# Patient Record
Sex: MALE | Race: ASIAN | NOT HISPANIC OR LATINO | URBAN - METROPOLITAN AREA
[De-identification: names, ages, dates, MRNs, and addresses within clinical notes are randomized per-mention and may not be internally consistent; named-entity substitution may affect disease eponyms.]

---

## 2017-01-30 ENCOUNTER — OUTPATIENT (OUTPATIENT)
Dept: OUTPATIENT SERVICES | Facility: HOSPITAL | Age: 52
LOS: 1 days | End: 2017-01-30

## 2017-01-30 ENCOUNTER — RESULT CHARGE (OUTPATIENT)
Age: 52
End: 2017-01-30

## 2017-01-30 ENCOUNTER — APPOINTMENT (OUTPATIENT)
Dept: INTERNAL MEDICINE | Facility: HOSPITAL | Age: 52
End: 2017-01-30

## 2017-01-30 VITALS — HEIGHT: 67 IN | WEIGHT: 214 LBS | BODY MASS INDEX: 33.59 KG/M2

## 2017-01-30 VITALS — DIASTOLIC BLOOD PRESSURE: 70 MMHG | SYSTOLIC BLOOD PRESSURE: 125 MMHG

## 2017-01-31 DIAGNOSIS — E78.5 HYPERLIPIDEMIA, UNSPECIFIED: ICD-10-CM

## 2017-01-31 DIAGNOSIS — R07.89 OTHER CHEST PAIN: ICD-10-CM

## 2017-01-31 DIAGNOSIS — E11.9 TYPE 2 DIABETES MELLITUS WITHOUT COMPLICATIONS: ICD-10-CM

## 2017-01-31 DIAGNOSIS — M54.31 SCIATICA, RIGHT SIDE: ICD-10-CM

## 2017-01-31 DIAGNOSIS — I10 ESSENTIAL (PRIMARY) HYPERTENSION: ICD-10-CM

## 2017-02-01 LAB — GLUCOSE BLDC GLUCOMTR-MCNC: 191

## 2017-03-16 ENCOUNTER — APPOINTMENT (OUTPATIENT)
Dept: GASTROENTEROLOGY | Facility: HOSPITAL | Age: 52
End: 2017-03-16

## 2017-03-16 ENCOUNTER — OUTPATIENT (OUTPATIENT)
Dept: OUTPATIENT SERVICES | Facility: HOSPITAL | Age: 52
LOS: 1 days | End: 2017-03-16

## 2017-03-16 VITALS
BODY MASS INDEX: 33.43 KG/M2 | HEIGHT: 67 IN | DIASTOLIC BLOOD PRESSURE: 83 MMHG | WEIGHT: 213 LBS | SYSTOLIC BLOOD PRESSURE: 133 MMHG | HEART RATE: 76 BPM

## 2017-03-16 DIAGNOSIS — Z12.11 ENCOUNTER FOR SCREENING FOR MALIGNANT NEOPLASM OF COLON: ICD-10-CM

## 2017-03-16 DIAGNOSIS — Z83.71 FAMILY HISTORY OF COLONIC POLYPS: ICD-10-CM

## 2017-04-04 ENCOUNTER — APPOINTMENT (OUTPATIENT)
Dept: OPHTHALMOLOGY | Facility: CLINIC | Age: 52
End: 2017-04-04

## 2017-04-04 ENCOUNTER — OUTPATIENT (OUTPATIENT)
Dept: OUTPATIENT SERVICES | Facility: HOSPITAL | Age: 52
LOS: 1 days | End: 2017-04-04

## 2017-04-06 ENCOUNTER — NON-APPOINTMENT (OUTPATIENT)
Age: 52
End: 2017-04-06

## 2017-04-06 ENCOUNTER — APPOINTMENT (OUTPATIENT)
Dept: CARDIOLOGY | Facility: HOSPITAL | Age: 52
End: 2017-04-06

## 2017-04-06 ENCOUNTER — OUTPATIENT (OUTPATIENT)
Dept: OUTPATIENT SERVICES | Facility: HOSPITAL | Age: 52
LOS: 1 days | End: 2017-04-06

## 2017-04-06 VITALS
DIASTOLIC BLOOD PRESSURE: 73 MMHG | HEART RATE: 78 BPM | RESPIRATION RATE: 17 BRPM | BODY MASS INDEX: 32.11 KG/M2 | WEIGHT: 205 LBS | SYSTOLIC BLOOD PRESSURE: 120 MMHG | OXYGEN SATURATION: 98 %

## 2017-04-10 DIAGNOSIS — R07.89 OTHER CHEST PAIN: ICD-10-CM

## 2017-05-15 ENCOUNTER — RESULT CHARGE (OUTPATIENT)
Age: 52
End: 2017-05-15

## 2017-05-15 ENCOUNTER — OUTPATIENT (OUTPATIENT)
Dept: OUTPATIENT SERVICES | Facility: HOSPITAL | Age: 52
LOS: 1 days | End: 2017-05-15

## 2017-05-15 ENCOUNTER — LABORATORY RESULT (OUTPATIENT)
Age: 52
End: 2017-05-15

## 2017-05-15 ENCOUNTER — APPOINTMENT (OUTPATIENT)
Dept: INTERNAL MEDICINE | Facility: HOSPITAL | Age: 52
End: 2017-05-15

## 2017-05-15 VITALS — HEIGHT: 67 IN | WEIGHT: 216 LBS | BODY MASS INDEX: 33.9 KG/M2

## 2017-05-15 LAB
BUN SERPL-MCNC: 13 MG/DL — SIGNIFICANT CHANGE UP (ref 7–23)
CALCIUM SERPL-MCNC: 9.3 MG/DL — SIGNIFICANT CHANGE UP (ref 8.4–10.5)
CHLORIDE SERPL-SCNC: 97 MMOL/L — LOW (ref 98–107)
CO2 SERPL-SCNC: 27 MMOL/L — SIGNIFICANT CHANGE UP (ref 22–31)
CREAT SERPL-MCNC: 0.92 MG/DL — SIGNIFICANT CHANGE UP (ref 0.5–1.3)
GLUCOSE SERPL-MCNC: 186 MG/DL — HIGH (ref 70–99)
HBA1C BLD-MCNC: 8.4 % — HIGH (ref 4–5.6)
POTASSIUM SERPL-MCNC: 4.2 MMOL/L — SIGNIFICANT CHANGE UP (ref 3.5–5.3)
POTASSIUM SERPL-SCNC: 4.2 MMOL/L — SIGNIFICANT CHANGE UP (ref 3.5–5.3)
SODIUM SERPL-SCNC: 137 MMOL/L — SIGNIFICANT CHANGE UP (ref 135–145)

## 2017-05-16 VITALS — DIASTOLIC BLOOD PRESSURE: 70 MMHG | SYSTOLIC BLOOD PRESSURE: 110 MMHG

## 2017-05-16 LAB — PSA FLD-MCNC: 0.86 NG/ML — SIGNIFICANT CHANGE UP (ref 0–4)

## 2017-05-17 ENCOUNTER — CHART COPY (OUTPATIENT)
Age: 52
End: 2017-05-17

## 2017-05-17 ENCOUNTER — RESULT REVIEW (OUTPATIENT)
Age: 52
End: 2017-05-17

## 2017-05-17 ENCOUNTER — OUTPATIENT (OUTPATIENT)
Dept: OUTPATIENT SERVICES | Facility: HOSPITAL | Age: 52
LOS: 1 days | Discharge: HOME | End: 2017-05-17
Payer: SUBSIDIZED

## 2017-05-17 DIAGNOSIS — Z12.11 ENCOUNTER FOR SCREENING FOR MALIGNANT NEOPLASM OF COLON: ICD-10-CM

## 2017-05-17 PROCEDURE — 43239 EGD BIOPSY SINGLE/MULTIPLE: CPT | Mod: GC

## 2017-05-17 PROCEDURE — G0121 COLON CA SCRN NOT HI RSK IND: CPT | Mod: GC

## 2017-05-17 PROCEDURE — 88305 TISSUE EXAM BY PATHOLOGIST: CPT | Mod: 26

## 2017-05-17 PROCEDURE — 88312 SPECIAL STAINS GROUP 1: CPT | Mod: 26

## 2017-05-19 LAB — SURGICAL PATHOLOGY STUDY: SIGNIFICANT CHANGE UP

## 2017-05-22 DIAGNOSIS — I10 ESSENTIAL (PRIMARY) HYPERTENSION: ICD-10-CM

## 2017-05-22 DIAGNOSIS — E78.5 HYPERLIPIDEMIA, UNSPECIFIED: ICD-10-CM

## 2017-05-22 DIAGNOSIS — M54.31 SCIATICA, RIGHT SIDE: ICD-10-CM

## 2017-05-22 DIAGNOSIS — R07.89 OTHER CHEST PAIN: ICD-10-CM

## 2017-05-22 DIAGNOSIS — Z12.11 ENCOUNTER FOR SCREENING FOR MALIGNANT NEOPLASM OF COLON: ICD-10-CM

## 2017-05-22 DIAGNOSIS — E11.9 TYPE 2 DIABETES MELLITUS WITHOUT COMPLICATIONS: ICD-10-CM

## 2017-05-25 LAB — GLUCOSE BLDC GLUCOMTR-MCNC: 214

## 2017-07-18 DIAGNOSIS — H04.123 DRY EYE SYNDROME OF BILATERAL LACRIMAL GLANDS: ICD-10-CM

## 2017-10-19 ENCOUNTER — APPOINTMENT (OUTPATIENT)
Dept: GASTROENTEROLOGY | Facility: HOSPITAL | Age: 52
End: 2017-10-19

## 2017-10-19 ENCOUNTER — OUTPATIENT (OUTPATIENT)
Dept: OUTPATIENT SERVICES | Facility: HOSPITAL | Age: 52
LOS: 1 days | End: 2017-10-19

## 2017-10-19 VITALS
WEIGHT: 213 LBS | SYSTOLIC BLOOD PRESSURE: 146 MMHG | HEART RATE: 72 BPM | HEIGHT: 67 IN | DIASTOLIC BLOOD PRESSURE: 72 MMHG | BODY MASS INDEX: 33.43 KG/M2

## 2017-10-19 DIAGNOSIS — E66.9 OBESITY, UNSPECIFIED: ICD-10-CM

## 2017-10-19 DIAGNOSIS — K64.8 OTHER HEMORRHOIDS: ICD-10-CM

## 2017-10-19 DIAGNOSIS — Z12.11 ENCOUNTER FOR SCREENING FOR MALIGNANT NEOPLASM OF COLON: ICD-10-CM

## 2017-11-09 ENCOUNTER — OUTPATIENT (OUTPATIENT)
Dept: OUTPATIENT SERVICES | Facility: HOSPITAL | Age: 52
LOS: 1 days | End: 2017-11-09

## 2017-11-09 ENCOUNTER — LABORATORY RESULT (OUTPATIENT)
Age: 52
End: 2017-11-09

## 2017-11-09 ENCOUNTER — APPOINTMENT (OUTPATIENT)
Dept: INTERNAL MEDICINE | Facility: HOSPITAL | Age: 52
End: 2017-11-09

## 2017-11-09 VITALS — WEIGHT: 211 LBS | BODY MASS INDEX: 33.12 KG/M2 | HEIGHT: 67 IN

## 2017-11-09 VITALS — SYSTOLIC BLOOD PRESSURE: 133 MMHG | DIASTOLIC BLOOD PRESSURE: 82 MMHG | HEART RATE: 76 BPM

## 2017-11-09 DIAGNOSIS — Z23 ENCOUNTER FOR IMMUNIZATION: ICD-10-CM

## 2017-11-09 LAB
BASOPHILS # BLD AUTO: 0.06 K/UL — SIGNIFICANT CHANGE UP (ref 0–0.2)
BASOPHILS NFR BLD AUTO: 0.7 % — SIGNIFICANT CHANGE UP (ref 0–2)
CHOLEST SERPL-MCNC: 121 MG/DL — SIGNIFICANT CHANGE UP (ref 120–199)
EOSINOPHIL # BLD AUTO: 0.56 K/UL — HIGH (ref 0–0.5)
EOSINOPHIL NFR BLD AUTO: 6.1 % — HIGH (ref 0–6)
HBA1C BLD-MCNC: 8.2 % — HIGH (ref 4–5.6)
HCT VFR BLD CALC: 42.3 % — SIGNIFICANT CHANGE UP (ref 39–50)
HDLC SERPL-MCNC: 47 MG/DL — SIGNIFICANT CHANGE UP (ref 35–55)
HGB BLD-MCNC: 13.5 G/DL — SIGNIFICANT CHANGE UP (ref 13–17)
IMM GRANULOCYTES # BLD AUTO: 0.02 # — SIGNIFICANT CHANGE UP
IMM GRANULOCYTES NFR BLD AUTO: 0.2 % — SIGNIFICANT CHANGE UP (ref 0–1.5)
LIPID PNL WITH DIRECT LDL SERPL: 60 MG/DL — SIGNIFICANT CHANGE UP
LYMPHOCYTES # BLD AUTO: 2.04 K/UL — SIGNIFICANT CHANGE UP (ref 1–3.3)
LYMPHOCYTES # BLD AUTO: 22.4 % — SIGNIFICANT CHANGE UP (ref 13–44)
MCHC RBC-ENTMCNC: 27.8 PG — SIGNIFICANT CHANGE UP (ref 27–34)
MCHC RBC-ENTMCNC: 31.9 % — LOW (ref 32–36)
MCV RBC AUTO: 87 FL — SIGNIFICANT CHANGE UP (ref 80–100)
MONOCYTES # BLD AUTO: 0.66 K/UL — SIGNIFICANT CHANGE UP (ref 0–0.9)
MONOCYTES NFR BLD AUTO: 7.2 % — SIGNIFICANT CHANGE UP (ref 2–14)
NEUTROPHILS # BLD AUTO: 5.77 K/UL — SIGNIFICANT CHANGE UP (ref 1.8–7.4)
NEUTROPHILS NFR BLD AUTO: 63.4 % — SIGNIFICANT CHANGE UP (ref 43–77)
NRBC # FLD: 0 — SIGNIFICANT CHANGE UP
PLATELET # BLD AUTO: 227 K/UL — SIGNIFICANT CHANGE UP (ref 150–400)
PMV BLD: 11.7 FL — SIGNIFICANT CHANGE UP (ref 7–13)
RBC # BLD: 4.86 M/UL — SIGNIFICANT CHANGE UP (ref 4.2–5.8)
RBC # FLD: 13.2 % — SIGNIFICANT CHANGE UP (ref 10.3–14.5)
TRIGL SERPL-MCNC: 101 MG/DL — SIGNIFICANT CHANGE UP (ref 10–149)
WBC # BLD: 9.11 K/UL — SIGNIFICANT CHANGE UP (ref 3.8–10.5)
WBC # FLD AUTO: 9.11 K/UL — SIGNIFICANT CHANGE UP (ref 3.8–10.5)

## 2017-11-09 RX ORDER — POLYETHYLENE GLYCOL 3350 AND ELECTROLYTES WITH LEMON FLAVOR 236; 22.74; 6.74; 5.86; 2.97 G/4L; G/4L; G/4L; G/4L; G/4L
236 POWDER, FOR SOLUTION ORAL
Qty: 1 | Refills: 0 | Status: COMPLETED | COMMUNITY
Start: 2017-03-16 | End: 2017-11-09

## 2017-11-09 RX ORDER — OMEPRAZOLE 40 MG/1
40 CAPSULE, DELAYED RELEASE ORAL
Qty: 30 | Refills: 3 | Status: COMPLETED | COMMUNITY
Start: 2017-01-30 | End: 2017-11-09

## 2017-11-10 ENCOUNTER — MED ADMIN CHARGE (OUTPATIENT)
Age: 52
End: 2017-11-10

## 2017-11-10 LAB
CREAT UR-MCNC: 222 MG/DL — SIGNIFICANT CHANGE UP
GLUCOSE BLDC GLUCOMTR-MCNC: 214
HCV AB S/CO SERPL IA: 0.1 S/CO — SIGNIFICANT CHANGE UP
HCV AB SERPL-IMP: SIGNIFICANT CHANGE UP
MICROALBUMIN UR-MCNC: 12.8 MG/DL — SIGNIFICANT CHANGE UP
MICROALBUMIN/CREAT UR-RTO: 58 MG/G — HIGH (ref 0–30)

## 2017-11-20 DIAGNOSIS — E11.9 TYPE 2 DIABETES MELLITUS WITHOUT COMPLICATIONS: ICD-10-CM

## 2017-11-20 DIAGNOSIS — E78.5 HYPERLIPIDEMIA, UNSPECIFIED: ICD-10-CM

## 2017-11-20 DIAGNOSIS — R68.89 OTHER GENERAL SYMPTOMS AND SIGNS: ICD-10-CM

## 2017-11-20 DIAGNOSIS — E66.9 OBESITY, UNSPECIFIED: ICD-10-CM

## 2017-11-20 DIAGNOSIS — R07.89 OTHER CHEST PAIN: ICD-10-CM

## 2017-11-20 DIAGNOSIS — I10 ESSENTIAL (PRIMARY) HYPERTENSION: ICD-10-CM

## 2017-11-20 DIAGNOSIS — Z00.00 ENCOUNTER FOR GENERAL ADULT MEDICAL EXAMINATION WITHOUT ABNORMAL FINDINGS: ICD-10-CM

## 2018-02-20 ENCOUNTER — APPOINTMENT (OUTPATIENT)
Dept: INTERNAL MEDICINE | Facility: HOSPITAL | Age: 53
End: 2018-02-20

## 2018-02-20 ENCOUNTER — RESULT CHARGE (OUTPATIENT)
Age: 53
End: 2018-02-20

## 2018-02-20 ENCOUNTER — OUTPATIENT (OUTPATIENT)
Dept: OUTPATIENT SERVICES | Facility: HOSPITAL | Age: 53
LOS: 1 days | End: 2018-02-20

## 2018-02-20 VITALS — BODY MASS INDEX: 33.43 KG/M2 | WEIGHT: 213 LBS | HEIGHT: 67 IN

## 2018-02-20 VITALS — DIASTOLIC BLOOD PRESSURE: 76 MMHG | SYSTOLIC BLOOD PRESSURE: 136 MMHG | HEART RATE: 77 BPM

## 2018-02-23 DIAGNOSIS — Z00.00 ENCOUNTER FOR GENERAL ADULT MEDICAL EXAMINATION WITHOUT ABNORMAL FINDINGS: ICD-10-CM

## 2018-02-23 DIAGNOSIS — E11.9 TYPE 2 DIABETES MELLITUS WITHOUT COMPLICATIONS: ICD-10-CM

## 2018-02-23 DIAGNOSIS — E78.5 HYPERLIPIDEMIA, UNSPECIFIED: ICD-10-CM

## 2018-02-23 DIAGNOSIS — E66.9 OBESITY, UNSPECIFIED: ICD-10-CM

## 2018-02-23 DIAGNOSIS — I10 ESSENTIAL (PRIMARY) HYPERTENSION: ICD-10-CM

## 2018-02-23 DIAGNOSIS — R07.89 OTHER CHEST PAIN: ICD-10-CM

## 2018-02-23 DIAGNOSIS — R68.89 OTHER GENERAL SYMPTOMS AND SIGNS: ICD-10-CM

## 2018-02-23 LAB — GLUCOSE BLDC GLUCOMTR-MCNC: 219

## 2018-03-02 ENCOUNTER — APPOINTMENT (OUTPATIENT)
Dept: OBGYN | Facility: HOSPITAL | Age: 53
End: 2018-03-02

## 2018-03-02 ENCOUNTER — OUTPATIENT (OUTPATIENT)
Dept: OUTPATIENT SERVICES | Facility: HOSPITAL | Age: 53
LOS: 1 days | End: 2018-03-02

## 2018-03-02 DIAGNOSIS — E11.9 TYPE 2 DIABETES MELLITUS WITHOUT COMPLICATIONS: ICD-10-CM

## 2018-04-26 ENCOUNTER — NON-APPOINTMENT (OUTPATIENT)
Age: 53
End: 2018-04-26

## 2018-04-26 ENCOUNTER — APPOINTMENT (OUTPATIENT)
Dept: CARDIOLOGY | Facility: HOSPITAL | Age: 53
End: 2018-04-26

## 2018-04-26 ENCOUNTER — OUTPATIENT (OUTPATIENT)
Dept: OUTPATIENT SERVICES | Facility: HOSPITAL | Age: 53
LOS: 1 days | End: 2018-04-26
Payer: COMMERCIAL

## 2018-04-26 VITALS
HEART RATE: 77 BPM | DIASTOLIC BLOOD PRESSURE: 79 MMHG | OXYGEN SATURATION: 98 % | BODY MASS INDEX: 32.89 KG/M2 | SYSTOLIC BLOOD PRESSURE: 127 MMHG | WEIGHT: 210 LBS | RESPIRATION RATE: 14 BRPM

## 2018-04-27 DIAGNOSIS — R07.89 OTHER CHEST PAIN: ICD-10-CM

## 2018-05-01 ENCOUNTER — APPOINTMENT (OUTPATIENT)
Dept: CV DIAGNOSTICS | Facility: HOSPITAL | Age: 53
End: 2018-05-01

## 2018-05-01 PROCEDURE — 93018 CV STRESS TEST I&R ONLY: CPT | Mod: GC

## 2018-05-01 PROCEDURE — 93016 CV STRESS TEST SUPVJ ONLY: CPT | Mod: GC

## 2018-05-01 PROCEDURE — 78452 HT MUSCLE IMAGE SPECT MULT: CPT | Mod: 26

## 2018-05-04 ENCOUNTER — OUTPATIENT (OUTPATIENT)
Dept: OUTPATIENT SERVICES | Facility: HOSPITAL | Age: 53
LOS: 1 days | End: 2018-05-04

## 2018-05-04 ENCOUNTER — LABORATORY RESULT (OUTPATIENT)
Age: 53
End: 2018-05-04

## 2018-05-04 ENCOUNTER — APPOINTMENT (OUTPATIENT)
Dept: INTERNAL MEDICINE | Facility: HOSPITAL | Age: 53
End: 2018-05-04

## 2018-05-04 VITALS — SYSTOLIC BLOOD PRESSURE: 121 MMHG | HEART RATE: 83 BPM | DIASTOLIC BLOOD PRESSURE: 61 MMHG

## 2018-05-04 VITALS — BODY MASS INDEX: 33.27 KG/M2 | WEIGHT: 212 LBS | HEIGHT: 67 IN

## 2018-05-04 DIAGNOSIS — E11.9 TYPE 2 DIABETES MELLITUS WITHOUT COMPLICATIONS: ICD-10-CM

## 2018-05-04 DIAGNOSIS — R07.89 OTHER CHEST PAIN: ICD-10-CM

## 2018-05-04 DIAGNOSIS — M79.606 PAIN IN LEG, UNSPECIFIED: ICD-10-CM

## 2018-05-04 DIAGNOSIS — Z12.11 ENCOUNTER FOR SCREENING FOR MALIGNANT NEOPLASM OF COLON: ICD-10-CM

## 2018-05-04 DIAGNOSIS — J30.2 OTHER SEASONAL ALLERGIC RHINITIS: ICD-10-CM

## 2018-05-04 LAB — HBA1C BLD-MCNC: 7.4 % — HIGH (ref 4–5.6)

## 2018-05-05 LAB
HCV AB S/CO SERPL IA: 0.1 S/CO — SIGNIFICANT CHANGE UP
HCV AB SERPL-IMP: SIGNIFICANT CHANGE UP

## 2018-05-08 LAB — GLUCOSE BLDC GLUCOMTR-MCNC: 234

## 2018-05-24 ENCOUNTER — OUTPATIENT (OUTPATIENT)
Dept: OUTPATIENT SERVICES | Facility: HOSPITAL | Age: 53
LOS: 1 days | End: 2018-05-24

## 2018-05-24 ENCOUNTER — APPOINTMENT (OUTPATIENT)
Dept: CARDIOLOGY | Facility: HOSPITAL | Age: 53
End: 2018-05-24

## 2018-05-24 VITALS
WEIGHT: 202 LBS | RESPIRATION RATE: 12 BRPM | HEART RATE: 73 BPM | BODY MASS INDEX: 31.64 KG/M2 | DIASTOLIC BLOOD PRESSURE: 77 MMHG | SYSTOLIC BLOOD PRESSURE: 116 MMHG | OXYGEN SATURATION: 99 %

## 2018-05-29 DIAGNOSIS — I10 ESSENTIAL (PRIMARY) HYPERTENSION: ICD-10-CM

## 2019-03-13 ENCOUNTER — LABORATORY RESULT (OUTPATIENT)
Age: 54
End: 2019-03-13

## 2019-03-13 ENCOUNTER — MED ADMIN CHARGE (OUTPATIENT)
Age: 54
End: 2019-03-13

## 2019-03-13 ENCOUNTER — OUTPATIENT (OUTPATIENT)
Dept: OUTPATIENT SERVICES | Facility: HOSPITAL | Age: 54
LOS: 1 days | End: 2019-03-13

## 2019-03-13 ENCOUNTER — APPOINTMENT (OUTPATIENT)
Dept: INTERNAL MEDICINE | Facility: HOSPITAL | Age: 54
End: 2019-03-13

## 2019-03-13 VITALS — WEIGHT: 200 LBS | HEIGHT: 67 IN | BODY MASS INDEX: 31.39 KG/M2

## 2019-03-13 DIAGNOSIS — Z23 ENCOUNTER FOR IMMUNIZATION: ICD-10-CM

## 2019-03-13 LAB
ALBUMIN SERPL ELPH-MCNC: 4.4 G/DL — SIGNIFICANT CHANGE UP (ref 3.3–5)
ALP SERPL-CCNC: 54 U/L — SIGNIFICANT CHANGE UP (ref 40–120)
ALT FLD-CCNC: 20 U/L — SIGNIFICANT CHANGE UP (ref 4–41)
ANION GAP SERPL CALC-SCNC: 11 MMO/L — SIGNIFICANT CHANGE UP (ref 7–14)
AST SERPL-CCNC: 19 U/L — SIGNIFICANT CHANGE UP (ref 4–40)
BASOPHILS # BLD AUTO: 0.06 K/UL — SIGNIFICANT CHANGE UP (ref 0–0.2)
BASOPHILS NFR BLD AUTO: 0.8 % — SIGNIFICANT CHANGE UP (ref 0–2)
BILIRUB SERPL-MCNC: 0.2 MG/DL — SIGNIFICANT CHANGE UP (ref 0.2–1.2)
BUN SERPL-MCNC: 20 MG/DL — SIGNIFICANT CHANGE UP (ref 7–23)
CALCIUM SERPL-MCNC: 9.8 MG/DL — SIGNIFICANT CHANGE UP (ref 8.4–10.5)
CHLORIDE SERPL-SCNC: 97 MMOL/L — LOW (ref 98–107)
CHOLEST SERPL-MCNC: 193 MG/DL — SIGNIFICANT CHANGE UP (ref 120–199)
CO2 SERPL-SCNC: 28 MMOL/L — SIGNIFICANT CHANGE UP (ref 22–31)
CREAT SERPL-MCNC: 1.06 MG/DL — SIGNIFICANT CHANGE UP (ref 0.5–1.3)
EOSINOPHIL # BLD AUTO: 0.62 K/UL — HIGH (ref 0–0.5)
EOSINOPHIL NFR BLD AUTO: 8.1 % — HIGH (ref 0–6)
GLUCOSE SERPL-MCNC: 137 MG/DL — HIGH (ref 70–99)
HBA1C BLD-MCNC: 6.9 % — HIGH (ref 4–5.6)
HCT VFR BLD CALC: 43.2 % — SIGNIFICANT CHANGE UP (ref 39–50)
HDLC SERPL-MCNC: 44 MG/DL — SIGNIFICANT CHANGE UP (ref 35–55)
HGB BLD-MCNC: 13.5 G/DL — SIGNIFICANT CHANGE UP (ref 13–17)
IMM GRANULOCYTES NFR BLD AUTO: 0.3 % — SIGNIFICANT CHANGE UP (ref 0–1.5)
LIPID PNL WITH DIRECT LDL SERPL: 162 MG/DL — SIGNIFICANT CHANGE UP
LYMPHOCYTES # BLD AUTO: 2.07 K/UL — SIGNIFICANT CHANGE UP (ref 1–3.3)
LYMPHOCYTES # BLD AUTO: 26.9 % — SIGNIFICANT CHANGE UP (ref 13–44)
MCHC RBC-ENTMCNC: 27.4 PG — SIGNIFICANT CHANGE UP (ref 27–34)
MCHC RBC-ENTMCNC: 31.3 % — LOW (ref 32–36)
MCV RBC AUTO: 87.8 FL — SIGNIFICANT CHANGE UP (ref 80–100)
MONOCYTES # BLD AUTO: 0.56 K/UL — SIGNIFICANT CHANGE UP (ref 0–0.9)
MONOCYTES NFR BLD AUTO: 7.3 % — SIGNIFICANT CHANGE UP (ref 2–14)
NEUTROPHILS # BLD AUTO: 4.37 K/UL — SIGNIFICANT CHANGE UP (ref 1.8–7.4)
NEUTROPHILS NFR BLD AUTO: 56.6 % — SIGNIFICANT CHANGE UP (ref 43–77)
NRBC # FLD: 0 K/UL — LOW (ref 25–125)
PLATELET # BLD AUTO: 221 K/UL — SIGNIFICANT CHANGE UP (ref 150–400)
PMV BLD: 11.1 FL — SIGNIFICANT CHANGE UP (ref 7–13)
POTASSIUM SERPL-MCNC: 4.1 MMOL/L — SIGNIFICANT CHANGE UP (ref 3.5–5.3)
POTASSIUM SERPL-SCNC: 4.1 MMOL/L — SIGNIFICANT CHANGE UP (ref 3.5–5.3)
PROT SERPL-MCNC: 7.6 G/DL — SIGNIFICANT CHANGE UP (ref 6–8.3)
RBC # BLD: 4.92 M/UL — SIGNIFICANT CHANGE UP (ref 4.2–5.8)
RBC # FLD: 13.3 % — SIGNIFICANT CHANGE UP (ref 10.3–14.5)
SODIUM SERPL-SCNC: 136 MMOL/L — SIGNIFICANT CHANGE UP (ref 135–145)
TRIGL SERPL-MCNC: 138 MG/DL — SIGNIFICANT CHANGE UP (ref 10–149)
WBC # BLD: 7.7 K/UL — SIGNIFICANT CHANGE UP (ref 3.8–10.5)
WBC # FLD AUTO: 7.7 K/UL — SIGNIFICANT CHANGE UP (ref 3.8–10.5)

## 2019-03-14 DIAGNOSIS — E11.9 TYPE 2 DIABETES MELLITUS WITHOUT COMPLICATIONS: ICD-10-CM

## 2019-03-14 LAB
CREAT UR-MCNC: 145 MG/DL — SIGNIFICANT CHANGE UP
GLUCOSE BLDC GLUCOMTR-MCNC: 153
MICROALBUMIN UR-MCNC: 10.5 MG/DL — SIGNIFICANT CHANGE UP
MICROALBUMIN/CREAT UR-RTO: 72 MG/G — HIGH (ref 0–30)

## 2019-03-15 VITALS — SYSTOLIC BLOOD PRESSURE: 145 MMHG | HEART RATE: 77 BPM | DIASTOLIC BLOOD PRESSURE: 84 MMHG

## 2019-03-15 NOTE — PHYSICAL EXAM
[No Acute Distress] : no acute distress [Well Nourished] : well nourished [Well Developed] : well developed [Well-Appearing] : well-appearing [Normal Sclera/Conjunctiva] : normal sclera/conjunctiva [PERRL] : pupils equal round and reactive to light [EOMI] : extraocular movements intact [Fundoscopic Exam Performed] : fundoscopic ~T exam ~C was performed [Normal Outer Ear/Nose] : the outer ears and nose were normal in appearance [Normal Oropharynx] : the oropharynx was normal [No JVD] : no jugular venous distention [Supple] : supple [No Lymphadenopathy] : no lymphadenopathy [Thyroid Normal, No Nodules] : the thyroid was normal and there were no nodules present [No Respiratory Distress] : no respiratory distress  [Clear to Auscultation] : lungs were clear to auscultation bilaterally [No Accessory Muscle Use] : no accessory muscle use [Normal Rate] : normal rate  [Regular Rhythm] : with a regular rhythm [Normal S1, S2] : normal S1 and S2 [No Murmur] : no murmur heard [No Carotid Bruits] : no carotid bruits [No Abdominal Bruit] : a ~M bruit was not heard ~T in the abdomen [No Varicosities] : no varicosities [Pedal Pulses Present] : the pedal pulses are present [No Edema] : there was no peripheral edema [No Extremity Clubbing/Cyanosis] : no extremity clubbing/cyanosis [No Palpable Aorta] : no palpable aorta [Soft] : abdomen soft [Non Tender] : non-tender [Non-distended] : non-distended [No Masses] : no abdominal mass palpated [No HSM] : no HSM [Normal Bowel Sounds] : normal bowel sounds [No CVA Tenderness] : no CVA  tenderness [No Spinal Tenderness] : no spinal tenderness [No Joint Swelling] : no joint swelling [Grossly Normal Strength/Tone] : grossly normal strength/tone [No Rash] : no rash [Normal Gait] : normal gait [Coordination Grossly Intact] : coordination grossly intact [No Focal Deficits] : no focal deficits [Normal Affect] : the affect was normal [Normal Insight/Judgement] : insight and judgment were intact [Comprehensive Foot Exam Normal] : Right and left foot were examined and both feet are normal. No ulcers in either foot. Toes are normal and with full ROM.  Normal tactile sensation with monofilament testing throughout both feet [de-identified] : Pain on rotation of L shoulder.

## 2019-03-15 NOTE — ASSESSMENT
[FreeTextEntry1] : 54M pmh HTN, HLD, DM2, obesity, seasonal allergies presents to clinic for a cough and management of chronic issues. \par \par #Cough/Seasonal Allergies\par Likely in setting of post nasal drip. Pt using Claritin inconsistently. Pt instructed to use Loratadine every day during seasons where he suffers from allergies to prevent this, even if he is not symptomatic on that day. Pt was offered Fluticasone as an alternative if he did not wish to use pills. Pt wishes to have prescription for both and will try Fluticasone and then Loratadine, and then pick which works best for him. Pt instructed to return to clinic if symptoms do not improve after 2 weeks of treatment, if cough worsens, or if he develops fevers, chills.\par -Loratadine 10mg QD trial\par -Fluticasone QD trial  \par \par #DM2\par As A1C has improved to 6.9% and as pt is concerned about GI symptoms, dose of Metformin to be lowered to 500mg BID. Pt to come to lab in 3 months to follow up on A1C check.\par -Metformin 500mg BID\par -return to lab in 3 months for A1C, if worsening, will increase to 1000mg BID\par \par #HTN\par Pt hypertensive in clinic today to 145/84. Pt has not been taking medication as he has run out. Pt is reluctant to take so many medications, at which point, we informed pt that with diet, exercise, and weight loss that he would possibly not need take as many medications, however, that he needed to continue taking them for now. Pt appeared motivated and agreed to continue with BP regimen. \par -Lisinopril 10mg QD\par \par #HLD\par Pt stopped taking statin 1 month ago and LD on lipid panel is now 162 up from 62. Pt was switched to Atorvastatin 40mg QD today. \par -Atorvastatin 40mg QD\par \par #Obesity\par Pt has made progress in weight loss and was encouraged to continue. Goal was set for 0.5lbs per week. Pt to return to clinic in 6 months and would be expected to have 12lbs of weight loss by that time. Pt appeared motivated and will continue to monitor diet.\par -12lbs weight loss in 6 months\par \par #HCM\par -pt needs to get Flu and Tdap on this visit\par -colonoscopy performed in 2017\par -pt to return to lab for A1C in 3 months (see DM2) and RTC in 6 months\par \par Pt discussed with Dr Ceballos. All risks and benefits were discussed with pt. Pt and attending has agreed with above assessment and plan. \par \par Edouard Carpenter MD PGY2\par Internal Medicine\par [facility]\par [phone]

## 2019-03-15 NOTE — HISTORY OF PRESENT ILLNESS
[FreeTextEntry1] : cough, ran out of medications [de-identified] : 54M pmh HTN, HLD, DM2, obesity, seasonal allergies presents to clinic for a cough and management of chronic issues. \par \par #Cough\par Pt reports that 3 weeks ago, he developed a cold with a cough, which resolved in 2 days. The cough remained and has been constant since the changing of the season. He reports that his cough is worse in the AM, with slight clear to yellow mucus, and has been made better with use of Claritin PRN. He denies fevers, chills pleuritic chest pain, recent travel, and recent sick contacts. \par \par \par #DM2\par Pt reports that he has not been as vigilant with his diet an exercise and that he ran out of his Metformin about a month ago. He denies polydipsia, polyuria, polyphagia. \par \par #HTN\par Pt reports that he has had his BP measured a few times, but has not been checking it consistently. He also reports that he ran out of Lisinopril about a month ago. Denies headaches, palpitations, chest pain. \par \par #HLD\par Pt reports that he has not taken list Simvastatin for a month as well. \par \par #Obesity\par Pt reports that he has been trying to watch his diet, but has not been good about it recently. He weighs himself on occasion, however, has not recently. \par \par #Seasonal Allergies\par Pt reports that since the weather changed, he has been noticing worsening of his symptoms. Pt reports that he has itchy eyes, runny nose, and cough since it has been getting warmer.

## 2019-03-15 NOTE — REVIEW OF SYSTEMS
[Redness] : redness [Dryness] : dryness [Itching] : itching [Negative] : Heme/Lymph [FreeTextEntry3] : Pt with occasional itchiness, redness, and dryness of eyes due to seasonal allergies.  [FreeTextEntry9] : Pt reports pain on anterior L shoulder from time to time.

## 2019-03-15 NOTE — COUNSELING
[Weight management counseling provided] : Weight management [Healthy eating counseling provided] : healthy eating [Activity counseling provided] : activity [Target Wt Loss Goal ___] : Target weight loss goal [unfilled] lbs [Weight Self Once Weekly] : Weight self once weekly [Decrease Portions] : Decrease food portions [Keep Food Diary] : Keep food diary [Walking] : Walking [None] : None [Good understanding] : Patient has a good understanding of lifestyle changes and the steps needed to achieve self management goals

## 2019-03-21 ENCOUNTER — NON-APPOINTMENT (OUTPATIENT)
Age: 54
End: 2019-03-21

## 2019-03-21 ENCOUNTER — APPOINTMENT (OUTPATIENT)
Dept: CARDIOLOGY | Facility: HOSPITAL | Age: 54
End: 2019-03-21

## 2019-03-21 VITALS — SYSTOLIC BLOOD PRESSURE: 114 MMHG | DIASTOLIC BLOOD PRESSURE: 74 MMHG

## 2019-03-21 VITALS
DIASTOLIC BLOOD PRESSURE: 81 MMHG | HEART RATE: 73 BPM | HEIGHT: 67 IN | SYSTOLIC BLOOD PRESSURE: 136 MMHG | BODY MASS INDEX: 32.02 KG/M2 | WEIGHT: 204 LBS | OXYGEN SATURATION: 99 %

## 2019-03-21 RX ORDER — SILDENAFIL 50 MG/1
50 TABLET ORAL
Qty: 10 | Refills: 3 | Status: DISCONTINUED | COMMUNITY
Start: 2019-03-21 | End: 2019-03-21

## 2019-03-21 NOTE — DISCUSSION/SUMMARY
[FreeTextEntry1] : 53 yo M w/ hx HTN, HLD, T2DM, sciatica presents for follow up.\par \par atypical chest pain - suggestive of MSK\par -stress test above wnl\par -cont daily asa 81\par \par HTN - well controlled\par -cont lisinopril 10\par \par HLD - cont atorvastatin\par \par DM - close follow up w PMD, improving\par \par RTC 6 mo\par Discussed w/ attending Dr Hendrickson

## 2019-03-21 NOTE — HISTORY OF PRESENT ILLNESS
[FreeTextEntry1] : 55 yo M w/ hx HTN, HLD, T2DM, sciatica presents for follow up.\par \par Intermittent recurrence of left shoulder sharp pain assoc w movement that is reproducible. Denies sob, palpitations, orhtopnea, pnd, lh, dizziness, loc, le swelling, weight change.\par \par 3/2019 - tot chol 193, trig 138, HDL 44, \par 9/2016 tot 157, trig 120, HDL 45, LDL 97 \par 11/2017 tot 121, trig 101, HDL 47, LDL 60\par 11/2017 A1C 8.2\par \par EKG 4/26/18 normal sinus\par \par exercise nuclear stress test 5/1/18\par IMPRESSIONS:Normal Study\par * Myocardial Perfusion SPECT results are normal at 93 % of\par MPHR.\par * Review of raw data shows: The study is of good technical\par quality.\par * The left ventricle was normal in size. Normal myocardial\par perfusion scan,with no evidence of infarction or inducible\par ischemia.\par * Post-stress gated wall motion analysis was performed\par (LVEF = 61 %;LVEDV = 68 ml.), revealing normal LV\par function. RV function appeared normal.

## 2019-04-17 ENCOUNTER — APPOINTMENT (OUTPATIENT)
Dept: OPHTHALMOLOGY | Facility: CLINIC | Age: 54
End: 2019-04-17

## 2019-05-23 ENCOUNTER — APPOINTMENT (OUTPATIENT)
Dept: OPHTHALMOLOGY | Facility: CLINIC | Age: 54
End: 2019-05-23

## 2019-06-15 ENCOUNTER — EMERGENCY (EMERGENCY)
Facility: HOSPITAL | Age: 54
LOS: 1 days | Discharge: HOME | End: 2019-06-15
Attending: EMERGENCY MEDICINE | Admitting: EMERGENCY MEDICINE
Payer: SELF-PAY

## 2019-06-15 VITALS
TEMPERATURE: 98 F | RESPIRATION RATE: 18 BRPM | HEART RATE: 92 BPM | DIASTOLIC BLOOD PRESSURE: 84 MMHG | SYSTOLIC BLOOD PRESSURE: 138 MMHG

## 2019-06-15 VITALS
RESPIRATION RATE: 18 BRPM | HEART RATE: 75 BPM | DIASTOLIC BLOOD PRESSURE: 71 MMHG | OXYGEN SATURATION: 100 % | SYSTOLIC BLOOD PRESSURE: 144 MMHG

## 2019-06-15 PROCEDURE — 71046 X-RAY EXAM CHEST 2 VIEWS: CPT | Mod: 26

## 2019-06-15 PROCEDURE — 72100 X-RAY EXAM L-S SPINE 2/3 VWS: CPT | Mod: 26

## 2019-06-15 PROCEDURE — 72170 X-RAY EXAM OF PELVIS: CPT | Mod: 26

## 2019-06-15 PROCEDURE — 72040 X-RAY EXAM NECK SPINE 2-3 VW: CPT | Mod: 26

## 2019-06-15 PROCEDURE — 99284 EMERGENCY DEPT VISIT MOD MDM: CPT

## 2019-06-15 RX ORDER — CYCLOBENZAPRINE HYDROCHLORIDE 10 MG/1
10 TABLET, FILM COATED ORAL ONCE
Refills: 0 | Status: COMPLETED | OUTPATIENT
Start: 2019-06-15 | End: 2019-06-15

## 2019-06-15 RX ORDER — ACETAMINOPHEN 500 MG
650 TABLET ORAL ONCE
Refills: 0 | Status: COMPLETED | OUTPATIENT
Start: 2019-06-15 | End: 2019-06-15

## 2019-06-15 RX ORDER — IBUPROFEN 200 MG
1 TABLET ORAL
Qty: 30 | Refills: 0
Start: 2019-06-15 | End: 2019-06-24

## 2019-06-15 RX ORDER — CYCLOBENZAPRINE HYDROCHLORIDE 10 MG/1
1 TABLET, FILM COATED ORAL
Qty: 21 | Refills: 0
Start: 2019-06-15 | End: 2019-06-21

## 2019-06-15 RX ADMIN — CYCLOBENZAPRINE HYDROCHLORIDE 10 MILLIGRAM(S): 10 TABLET, FILM COATED ORAL at 21:25

## 2019-06-15 RX ADMIN — Medication 650 MILLIGRAM(S): at 21:25

## 2019-06-15 NOTE — ED PROVIDER NOTE - CLINICAL SUMMARY MEDICAL DECISION MAKING FREE TEXT BOX
55yo M pmhx HTN HLD DM p/w CC fall from ladder yesterday, pain like muscular in origin - will get xrays chest pelvis and C and L spine. Pain control and reassess.

## 2019-06-15 NOTE — ED ADULT TRIAGE NOTE - CHIEF COMPLAINT QUOTE
Pt states he fell off a nine foot ladder yesterday and landed on cement--pt c/o neck pain lt hip and leg pain as well as lower back pain--

## 2019-06-15 NOTE — ED PROVIDER NOTE - CARE PLAN
Principal Discharge DX:	Cervical strain, acute, initial encounter  Secondary Diagnosis:	Acute low back pain, unspecified back pain laterality, with sciatica presence unspecified  Secondary Diagnosis:	Fall from ladder, initial encounter

## 2019-06-15 NOTE — ED PROVIDER NOTE - PROGRESS NOTE DETAILS
Called to walk-in triage. Pt fell off ladder yesterday at 2pm and landed on back. Patient ambulatory, only complaining of left arm pain and left leg pain and right shoulder pain. Neuro exam w/out C-spine tenderness midline and normal lateral rotation and flexion/extension so c-spine cleared, no stepoffs on spine, no abdominal tenderness. Vitals stable. Will send back to main ER but no need for trauma protocol at this time.   -Rafael Meadows PGY4 [facility]#[id number]

## 2019-06-15 NOTE — ED ADULT NURSE NOTE - NSIMPLEMENTINTERV_GEN_ALL_ED
Implemented All Universal Safety Interventions:  [place] to call system. Call bell, personal items and telephone within reach. Instruct patient to call for assistance. Room bathroom lighting operational. Non-slip footwear when patient is off stretcher. Physically safe environment: no spills, clutter or unnecessary equipment. Stretcher in lowest position, wheels locked, appropriate side rails in place.

## 2019-06-15 NOTE — ED PROVIDER NOTE - SECONDARY DIAGNOSIS.
Acute low back pain, unspecified back pain laterality, with sciatica presence unspecified Fall from ladder, initial encounter

## 2019-06-15 NOTE — ED PROVIDER NOTE - OBJECTIVE STATEMENT
53yo M pmhx HTN HLD DM p/w CC neck pain, back pain since falling off a ladder yesterday. Pt. states yesterday afternoon he was about 8 ft up a ladder when he fell, he is unsure if he hit his head, but denies LOC. He was able to get up and ambulate after the fall. He states that today he felt more sore than yesterday so came to the ED. Denies HA fever chills CP SOB n/v/d abd pain urinary symptoms.

## 2019-06-15 NOTE — ED PROVIDER NOTE - MUSCULOSKELETAL, MLM
No midline spinal tenderness. Spine appears normal, range of motion is not limited, no muscle or joint tenderness. Moving all four extremities equally. Normal gait. No gross abnormalities.

## 2019-06-15 NOTE — ED PROVIDER NOTE - ATTENDING CONTRIBUTION TO CARE
54 year old male with a fall off a ladder yesterday. Thinks he fell almost 8 feet. No LOC, no head injury. He is complaining of left lower back pain, right sided neck pain. No abd pain, no chest pain, no sob, no nausea, no vomiting. On exam: GCS=15, pt is aaox3, normal gait, no midline c/t/l spine tenderness and normal rom of the neck, rrr, lungs cta, abd soft, nt, left si tenderness.  Xrays noted. Pt is ambulatory, tolerating po. Will dc. Precautions reviewed.

## 2019-06-17 NOTE — DISCUSSION/SUMMARY
[ED Visit] : a visit to ED [FreeTextEntry1] : 54M presented to the ED after falling off a ladder. Pt had xrays done which were negative. Pt d/c home  room air

## 2019-08-30 ENCOUNTER — APPOINTMENT (OUTPATIENT)
Dept: INTERNAL MEDICINE | Facility: CLINIC | Age: 54
End: 2019-08-30

## 2019-08-30 ENCOUNTER — LABORATORY RESULT (OUTPATIENT)
Age: 54
End: 2019-08-30

## 2019-08-30 ENCOUNTER — OUTPATIENT (OUTPATIENT)
Dept: OUTPATIENT SERVICES | Facility: HOSPITAL | Age: 54
LOS: 1 days | End: 2019-08-30

## 2019-08-30 ENCOUNTER — OTHER (OUTPATIENT)
Age: 54
End: 2019-08-30

## 2019-08-30 LAB — HBA1C BLD-MCNC: 7.4 % — HIGH (ref 4–5.6)

## 2019-09-03 ENCOUNTER — APPOINTMENT (OUTPATIENT)
Dept: INTERNAL MEDICINE | Facility: CLINIC | Age: 54
End: 2019-09-03

## 2019-09-03 ENCOUNTER — OUTPATIENT (OUTPATIENT)
Dept: OUTPATIENT SERVICES | Facility: HOSPITAL | Age: 54
LOS: 1 days | End: 2019-09-03

## 2019-09-03 VITALS — RESPIRATION RATE: 14 BRPM | OXYGEN SATURATION: 97 % | TEMPERATURE: 98.4 F | HEART RATE: 77 BPM

## 2019-09-04 ENCOUNTER — OTHER (OUTPATIENT)
Age: 54
End: 2019-09-04

## 2019-09-04 VITALS — SYSTOLIC BLOOD PRESSURE: 134 MMHG | TEMPERATURE: 97.9 F | DIASTOLIC BLOOD PRESSURE: 68 MMHG

## 2019-09-05 NOTE — REVIEW OF SYSTEMS
[Shortness Of Breath] : no shortness of breath [Wheezing] : no wheezing [Dyspnea on Exertion] : not dyspnea on exertion

## 2019-09-05 NOTE — HISTORY OF PRESENT ILLNESS
[FreeTextEntry8] : 54 year old male with HTN, HLD, T2DM, Obesity and seasonal allergies here with complaint of persistent cough.\par \par The patient was seen in March 2019 for a cough suspected to be secondary to a post-nasal drip in the setting of seasonal allergies. The patient was to take fluticasone and loratadine. The patient's symptoms at the time were partially relieved with fluticasone. He never took the loratadine. \par \par Today, the patient complains of persistent dry cough for the past two weeks. He occasionally coughs up clear yellow mucus. The cough is associated with occasional runny nose and itchy eyes. It is worse in the morning and evening. The cough was originally relieved with NyQuil, lime and honey. However, he traveled to [place] for two weeks and was not able to take these items. The cough is worse with dust. The cough is not associated with food. He has only had heartburn for the last two days. He has not started any new medications. He has used lisinopril for the last two years. He denies fever, chest pain, shortness of breath, swelling, or a smoking history.

## 2019-09-06 DIAGNOSIS — R05 COUGH: ICD-10-CM

## 2019-10-11 ENCOUNTER — APPOINTMENT (OUTPATIENT)
Dept: INTERNAL MEDICINE | Facility: CLINIC | Age: 54
End: 2019-10-11

## 2019-10-11 ENCOUNTER — OUTPATIENT (OUTPATIENT)
Dept: OUTPATIENT SERVICES | Facility: HOSPITAL | Age: 54
LOS: 1 days | End: 2019-10-11

## 2019-10-11 VITALS — HEIGHT: 67 IN | BODY MASS INDEX: 30.45 KG/M2 | WEIGHT: 194 LBS

## 2019-10-11 VITALS — HEART RATE: 74 BPM | DIASTOLIC BLOOD PRESSURE: 80 MMHG | SYSTOLIC BLOOD PRESSURE: 122 MMHG

## 2019-10-11 DIAGNOSIS — R53.83 OTHER FATIGUE: ICD-10-CM

## 2019-10-11 DIAGNOSIS — Z86.69 PERSONAL HISTORY OF OTHER DISEASES OF THE NERVOUS SYSTEM AND SENSE ORGANS: ICD-10-CM

## 2019-10-11 NOTE — PHYSICAL EXAM
[No Acute Distress] : no acute distress [Well Nourished] : well nourished [Well Developed] : well developed [Well-Appearing] : well-appearing [Normal Sclera/Conjunctiva] : normal sclera/conjunctiva [PERRL] : pupils equal round and reactive to light [EOMI] : extraocular movements intact [Normal Outer Ear/Nose] : the outer ears and nose were normal in appearance [Normal Oropharynx] : the oropharynx was normal [No JVD] : no jugular venous distention [Supple] : supple [No Lymphadenopathy] : no lymphadenopathy [Thyroid Normal, No Nodules] : the thyroid was normal and there were no nodules present [No Respiratory Distress] : no respiratory distress  [Clear to Auscultation] : lungs were clear to auscultation bilaterally [No Accessory Muscle Use] : no accessory muscle use [Regular Rhythm] : with a regular rhythm [Normal Rate] : normal rate  [Normal S1, S2] : normal S1 and S2 [No Murmur] : no murmur heard [Pedal Pulses Present] : the pedal pulses are present [No Edema] : there was no peripheral edema [No Palpable Aorta] : no palpable aorta [No Extremity Clubbing/Cyanosis] : no extremity clubbing/cyanosis [Soft] : abdomen soft [Non-distended] : non-distended [Non Tender] : non-tender [No Masses] : no abdominal mass palpated [No HSM] : no HSM [Normal Bowel Sounds] : normal bowel sounds [Normal Posterior Cervical Nodes] : no posterior cervical lymphadenopathy [Normal Anterior Cervical Nodes] : no anterior cervical lymphadenopathy [No CVA Tenderness] : no CVA  tenderness [No Spinal Tenderness] : no spinal tenderness [No Joint Swelling] : no joint swelling [Grossly Normal Strength/Tone] : grossly normal strength/tone [No Rash] : no rash [Coordination Grossly Intact] : coordination grossly intact [Normal Gait] : normal gait [No Focal Deficits] : no focal deficits [Deep Tendon Reflexes (DTR)] : deep tendon reflexes were 2+ and symmetric [Normal Affect] : the affect was normal [Normal Insight/Judgement] : insight and judgment were intact

## 2019-10-15 LAB — GLUCOSE BLDC GLUCOMTR-MCNC: 132

## 2019-10-27 NOTE — REVIEW OF SYSTEMS
[Cough] : cough [Negative] : Neurological [Shortness Of Breath] : no shortness of breath [Wheezing] : no wheezing [Dyspnea on Exertion] : not dyspnea on exertion [FreeTextEntry4] : nasal congestion,  [FreeTextEntry9] : occasional back pain

## 2019-10-27 NOTE — HISTORY OF PRESENT ILLNESS
[FreeTextEntry1] : f/u appointment  [de-identified] : 53 y/o M w/ MHx of HTN, HLD, DM (A1c 7.4 on 9/19), season allergies presenting for f/u \par \par #Cough\par Reports only mild improvement with cough, sometimes dry, sometimes w/ phlegm (yellow), occurs at all times, sometimes at night. \par No fevers currently. \par Pt was only taking loratadine and Flonase as needed.\par \par #HTN \par Complaint with medications, but doesn’t check BP at home. \par Reports 110-130s when he checks on occasion \par \par #DM \par Takes metformin 500 mg once a day at night \par \par #HLD\par Complaint with Lipitor

## 2019-10-27 NOTE — ASSESSMENT
[FreeTextEntry1] : 53 y/o M w/ MHx of HTN, HLD, DM (A1c 7.4 on 9/19), season allergies presenting for f/u \par \par #Cough\par Likely seasonal allergies related, less likely GERD, given not only at night \par ACEi already changed to ARB\par Pt reinforced to take loratadine, flonase daily \par \par #HTN\par C/w losartan \par Encouraged to get BP monitor and check at home\par \par #DM\par HbA1c elevated to 7.4 \par Prescribed metformin 500 mg BID, but only taking once day\par Encouraged to take BID with food\par Recheck HbA1c in 10 weeks after change in metformin \par Diet education provided\par \par #HLD\par C/w lipitor (LDL elevated to 162)\par Check labs check visit\par \par #Back pain \par Hx of sciatica and recent fall 8/2019\par PT as per patient request\par \par #HCM\par Flu shot today\par Tdap, Pneumovax UTD\par Cscope done 2017, next due 2027\par HIV, HCV negative \par Pt concerned about malignancy screening d/t death in family from cancer (unknown which type) \par Consider PSA screening next visit\par \par Case d/w Dr. Stubbs \par RTC in 10 weeks \par \par SV\par [facility]

## 2019-10-28 DIAGNOSIS — E78.5 HYPERLIPIDEMIA, UNSPECIFIED: ICD-10-CM

## 2019-10-28 DIAGNOSIS — Z00.00 ENCOUNTER FOR GENERAL ADULT MEDICAL EXAMINATION WITHOUT ABNORMAL FINDINGS: ICD-10-CM

## 2019-10-28 DIAGNOSIS — I10 ESSENTIAL (PRIMARY) HYPERTENSION: ICD-10-CM

## 2019-10-28 DIAGNOSIS — E11.9 TYPE 2 DIABETES MELLITUS WITHOUT COMPLICATIONS: ICD-10-CM

## 2019-10-28 DIAGNOSIS — Z23 ENCOUNTER FOR IMMUNIZATION: ICD-10-CM

## 2019-10-28 DIAGNOSIS — J30.2 OTHER SEASONAL ALLERGIC RHINITIS: ICD-10-CM

## 2019-10-28 DIAGNOSIS — R05 COUGH: ICD-10-CM

## 2019-10-28 DIAGNOSIS — M54.5 LOW BACK PAIN: ICD-10-CM

## 2019-12-16 ENCOUNTER — OUTPATIENT (OUTPATIENT)
Dept: OUTPATIENT SERVICES | Facility: HOSPITAL | Age: 54
LOS: 1 days | End: 2019-12-16

## 2019-12-16 ENCOUNTER — APPOINTMENT (OUTPATIENT)
Dept: INTERNAL MEDICINE | Facility: CLINIC | Age: 54
End: 2019-12-16

## 2019-12-17 DIAGNOSIS — E11.9 TYPE 2 DIABETES MELLITUS WITHOUT COMPLICATIONS: ICD-10-CM

## 2019-12-18 LAB — HBA1C MFR BLD HPLC: 7.1

## 2019-12-20 ENCOUNTER — APPOINTMENT (OUTPATIENT)
Dept: INTERNAL MEDICINE | Facility: CLINIC | Age: 54
End: 2019-12-20

## 2020-02-25 ENCOUNTER — APPOINTMENT (OUTPATIENT)
Dept: INTERNAL MEDICINE | Facility: CLINIC | Age: 55
End: 2020-02-25

## 2020-02-25 ENCOUNTER — OUTPATIENT (OUTPATIENT)
Dept: OUTPATIENT SERVICES | Facility: HOSPITAL | Age: 55
LOS: 1 days | End: 2020-02-25

## 2020-02-25 ENCOUNTER — LABORATORY RESULT (OUTPATIENT)
Age: 55
End: 2020-02-25

## 2020-02-25 VITALS
BODY MASS INDEX: 30.61 KG/M2 | RESPIRATION RATE: 13 BRPM | OXYGEN SATURATION: 98 % | HEIGHT: 67 IN | DIASTOLIC BLOOD PRESSURE: 62 MMHG | SYSTOLIC BLOOD PRESSURE: 124 MMHG | HEART RATE: 75 BPM | WEIGHT: 195 LBS

## 2020-02-25 DIAGNOSIS — M54.5 LOW BACK PAIN: ICD-10-CM

## 2020-02-25 LAB
ANION GAP SERPL CALC-SCNC: 14 MMO/L — SIGNIFICANT CHANGE UP (ref 7–14)
BUN SERPL-MCNC: 13 MG/DL — SIGNIFICANT CHANGE UP (ref 7–23)
CALCIUM SERPL-MCNC: 9.2 MG/DL — SIGNIFICANT CHANGE UP (ref 8.4–10.5)
CHLORIDE SERPL-SCNC: 99 MMOL/L — SIGNIFICANT CHANGE UP (ref 98–107)
CHOLEST SERPL-MCNC: 114 MG/DL — LOW (ref 120–199)
CO2 SERPL-SCNC: 26 MMOL/L — SIGNIFICANT CHANGE UP (ref 22–31)
CREAT SERPL-MCNC: 0.98 MG/DL — SIGNIFICANT CHANGE UP (ref 0.5–1.3)
GLUCOSE BLDC GLUCOMTR-MCNC: 136
GLUCOSE SERPL-MCNC: 142 MG/DL — HIGH (ref 70–99)
HDLC SERPL-MCNC: 40 MG/DL — SIGNIFICANT CHANGE UP (ref 35–55)
LIPID PNL WITH DIRECT LDL SERPL: 56 MG/DL — SIGNIFICANT CHANGE UP
POTASSIUM SERPL-MCNC: 4.3 MMOL/L — SIGNIFICANT CHANGE UP (ref 3.5–5.3)
POTASSIUM SERPL-SCNC: 4.3 MMOL/L — SIGNIFICANT CHANGE UP (ref 3.5–5.3)
SODIUM SERPL-SCNC: 139 MMOL/L — SIGNIFICANT CHANGE UP (ref 135–145)
TRIGL SERPL-MCNC: 183 MG/DL — HIGH (ref 10–149)

## 2020-02-26 ENCOUNTER — RESULT CHARGE (OUTPATIENT)
Age: 55
End: 2020-02-26

## 2020-02-26 VITALS — DIASTOLIC BLOOD PRESSURE: 80 MMHG | SYSTOLIC BLOOD PRESSURE: 115 MMHG

## 2020-02-26 PROBLEM — M54.5 LOW BACK PAIN, UNSPECIFIED BACK PAIN LATERALITY, UNSPECIFIED CHRONICITY, UNSPECIFIED WHETHER SCIATICA PRESENT: Noted: 2019-10-11

## 2020-02-26 LAB
CREAT UR-MCNC: 191 MG/DL — SIGNIFICANT CHANGE UP
HBA1C MFR BLD HPLC: 7
MICROALBUMIN UR-MCNC: 6 MG/DL — SIGNIFICANT CHANGE UP
MICROALBUMIN/CREAT UR-RTO: 31 MG/G — HIGH (ref 0–30)

## 2020-02-28 DIAGNOSIS — M54.31 SCIATICA, RIGHT SIDE: ICD-10-CM

## 2020-02-28 DIAGNOSIS — I10 ESSENTIAL (PRIMARY) HYPERTENSION: ICD-10-CM

## 2020-02-28 DIAGNOSIS — E78.5 HYPERLIPIDEMIA, UNSPECIFIED: ICD-10-CM

## 2020-02-28 DIAGNOSIS — M54.5 LOW BACK PAIN: ICD-10-CM

## 2020-02-28 DIAGNOSIS — R05 COUGH: ICD-10-CM

## 2020-02-28 DIAGNOSIS — E11.9 TYPE 2 DIABETES MELLITUS WITHOUT COMPLICATIONS: ICD-10-CM

## 2020-02-28 NOTE — PHYSICAL EXAM
[No Acute Distress] : no acute distress [Well Nourished] : well nourished [Well Developed] : well developed [Normal Sclera/Conjunctiva] : normal sclera/conjunctiva [Well-Appearing] : well-appearing [PERRL] : pupils equal round and reactive to light [EOMI] : extraocular movements intact [Normal Outer Ear/Nose] : the outer ears and nose were normal in appearance [Normal Oropharynx] : the oropharynx was normal [No JVD] : no jugular venous distention [Supple] : supple [No Lymphadenopathy] : no lymphadenopathy [No Respiratory Distress] : no respiratory distress  [Thyroid Normal, No Nodules] : the thyroid was normal and there were no nodules present [No Accessory Muscle Use] : no accessory muscle use [Clear to Auscultation] : lungs were clear to auscultation bilaterally [Normal S1, S2] : normal S1 and S2 [Regular Rhythm] : with a regular rhythm [Normal Rate] : normal rate  [No Murmur] : no murmur heard [No Carotid Bruits] : no carotid bruits [No Abdominal Bruit] : a ~M bruit was not heard ~T in the abdomen [No Varicosities] : no varicosities [Pedal Pulses Present] : the pedal pulses are present [No Edema] : there was no peripheral edema [No Palpable Aorta] : no palpable aorta [No Extremity Clubbing/Cyanosis] : no extremity clubbing/cyanosis [Soft] : abdomen soft [Non Tender] : non-tender [Non-distended] : non-distended [No Masses] : no abdominal mass palpated [No HSM] : no HSM [Normal Posterior Cervical Nodes] : no posterior cervical lymphadenopathy [Normal Bowel Sounds] : normal bowel sounds [Normal Anterior Cervical Nodes] : no anterior cervical lymphadenopathy [No CVA Tenderness] : no CVA  tenderness [No Spinal Tenderness] : no spinal tenderness [No Joint Swelling] : no joint swelling [No Rash] : no rash [Grossly Normal Strength/Tone] : grossly normal strength/tone [Coordination Grossly Intact] : coordination grossly intact [Normal Gait] : normal gait [No Focal Deficits] : no focal deficits [Deep Tendon Reflexes (DTR)] : deep tendon reflexes were 2+ and symmetric [Normal Affect] : the affect was normal [Comprehensive Foot Exam Normal] : Right and left foot were examined and both feet are normal. No ulcers in either foot. Toes are normal and with full ROM.  Normal tactile sensation with monofilament testing throughout both feet [Normal Insight/Judgement] : insight and judgment were intact [de-identified] : no point tenderness over spine. [de-identified] : no weakness of lower extremeties

## 2020-02-28 NOTE — REVIEW OF SYSTEMS
[Cough] : cough [Back Pain] : back pain [Headache] : headache [Negative] : Heme/Lymph [Shortness Of Breath] : no shortness of breath [Dyspnea on Exertion] : not dyspnea on exertion [Wheezing] : no wheezing [Joint Pain] : no joint pain [Muscle Weakness] : no muscle weakness [Joint Stiffness] : no joint stiffness [Muscle Pain] : no muscle pain [Joint Swelling] : no joint swelling [Dizziness] : no dizziness [Unsteady Walk] : no ataxia [Confusion] : no confusion [Fainting] : no fainting [Memory Loss] : no memory loss [de-identified] : headache as per HPI [FreeTextEntry6] : cough as per HPI [FreeTextEntry9] : back pain as per HPI

## 2020-02-28 NOTE — ASSESSMENT
[FreeTextEntry1] : 54 year old male with HTN, HLD, T2DM, Obesity and seasonal allergies here for continuation of care for his back pain, cough, and new headache.\par \par Rest of plan as per assessment. \par \par #HCM\par -pt up to date on vaccinations\par -pt up to date on age appropriate cancer screenings\par -pt to return to clinic in 3 months\par \par Pt discussed with Dr Jaime. All risks and benefits were discussed with pt. Pt and attending has agreed with above assessment and plan. \par \par Edouard Carpenter MD PGY3\par Internal Medicine\par [facility] [place]\par [phone]

## 2020-02-28 NOTE — HISTORY OF PRESENT ILLNESS
[FreeTextEntry1] : headache, back pain [de-identified] : 54 year old male with HTN, HLD, T2DM, Obesity and seasonal allergies here for continuation of care for his back pain, cough, and new headache.\par \par #Back Pain\par Pt has long history of shooting pains down his L leg, but noted that now it is bilateral. Pt reported some improvement with PT, however, not resolved completely. Pt reports pain as 7/10, aching in nature, radiating down the leg, improved with Advil, worse with extended periods of sitting.\par \par #Headache\par Pt reports that this last week, he had two instances of headache. Both were mild, self resolving after 20 mins. Pt brings this up because he was concerned that he had 2 family members who went to sleep with a headache and woke up blind. When asked what these family members had, he could not recall. \par \par #Cough\par Pt reports that cough improved after being switched from ACE to ARB. Pt reports still having a cough in the AM and PM, but much improved. Pt has now been taking the Loratidine PRN. No fevers, chills, night sweats, or weight loss.

## 2020-03-11 ENCOUNTER — APPOINTMENT (OUTPATIENT)
Dept: PHYSICAL MEDICINE AND REHAB | Facility: CLINIC | Age: 55
End: 2020-03-11
Payer: SELF-PAY

## 2020-03-11 VITALS
HEIGHT: 66 IN | DIASTOLIC BLOOD PRESSURE: 72 MMHG | HEART RATE: 79 BPM | TEMPERATURE: 97.4 F | WEIGHT: 195 LBS | BODY MASS INDEX: 31.34 KG/M2 | SYSTOLIC BLOOD PRESSURE: 111 MMHG | OXYGEN SATURATION: 97 %

## 2020-03-11 PROCEDURE — 99204 OFFICE O/P NEW MOD 45 MIN: CPT

## 2020-03-11 NOTE — PHYSICAL EXAM
[FreeTextEntry1] : General: NAD, alert\par Psych: normal mood and affect\par HEENT: NC/AT, normal visual tracking\par Pulmonary: no resp distress, chest expansion appears symmetrical\par CV: extremities are warm and perfused\par Abd: non-distended\par Ext: no c/c/e\par skin: normal color, appearance, and temperature\par \par Lumbar/Hip Spine:\par Gait - non-antalgic, able to heel and toe walk and perform functional squat\par Inspection: normal muscle bulk without asymmetry\par Tenderness to palpation: TTP over bilateral sacroiliac joints, mild over bilateral PSIS, lower lumbar paraspinal\par ROM lumbar - near full forward flexion, 5-10* extension with pain\par MMT: 5/5 bilateral lower extremities (HF, KE, KF, DF, PF, EHL)\par Reflexes: symmetric bilateral achilles and patella \par Sensory: intact to light touch in all dermatomes of the bilateral lower extremities\par Provocative testing:\par Facet loading - negative\par SLR - negative\par ASHLEY positive bilaterally, [place] positive bilaterally\par Scour - negative\par Compression - equivocal

## 2020-03-11 NOTE — HISTORY OF PRESENT ILLNESS
[FreeTextEntry1] : Mr. MARCUS RUELAS is a 55 year old male here for initial evaluation for LBP and bilateral leg pain.  Reports falling ~7-8 feet off ladder and onto his back.  Went to ER at the time and had Xray (unremarkable) and d/c with ibuprofen.  Pain has been persistent since - note worsening in 10/2019 PCP ordered PT. Has been having ongoing PT since 11/2019 with partial improvement - functional + decrease pain intensity.  Pain now rated at 4-6/10\par \par Location: low back + R>L LE\par Inciting Event: fall from ladder (7/2019)\par Onset/timing: was intermittent and now fairly constant\par Quality: sharp, pressure\par Severity: 4-6/10, max 10/10\par Exacerbating Factor: prolong standing, walking\par Relieving factor: rest\par Radiation: initially only R LE, now bilateral (R>L) posterior thigh\par Numbness/Tingling: denies\par Cough/Sneezing: increase pressure\par Bowel/Bladder incontinence: denies\par Extremity weakness: denies\par \par Prior Treatments\par Injections: denies\par Surgery:  denies\par PT/OT: ongoing since 11/2019\par Medications: Tylenol, advil PRN\par Images: Xray - unremarkable\par \par Patient denies new weakness, numbness or paresthesia.  Denies bowel/bladder dysfunction, fevers, chills, weight loss, night pain, or night sweats.\par

## 2020-03-11 NOTE — ASSESSMENT
[FreeTextEntry1] : This is MARCUS RUELAS,  a 55 year-old male with LBP after fall.  Xray from ER with no acute findings.  Pain likely 2/2 lumbar radiculopathy, lumbar spinal stenosis, SI joint pain.  Has had ongoing PT 3+ months with partial benefit.  Oral medication with mild improvement.  Despite conservative care, pain still present.\par \par Plan:\par - continue with PT/HEP\par -Start mobic 15 mg PO qdaily x 7 days, recommend to take with food.  Denies CKD, CAD, or gastritis.  Recommend that if patient develops GI symptoms including abdominal pain, nausea, or vomiting.\par -Start cyclobenzaprine 10 mg PO qHS PRN pain, dispense 30.  Patient denies a history of CHF, liver dysfunction or arrythmias.\par - Lumbar MRI to evaluate for stenosis/HNP\par - follow up for MRI results\par \par Crow Ch MD\par Spine and Sports Medicine\par \par [employer]\par At [facility]\par \par

## 2020-03-11 NOTE — DATA REVIEWED
[Plain X-Rays] : plain X-Rays [FreeTextEntry1] : Lumbar Xray\par Multilevel degenerative changes including disc osteophyte \par formation, facet arthropathy, endplate sclerosis and mild loss of vertebral \par body height.

## 2020-03-16 ENCOUNTER — OUTPATIENT (OUTPATIENT)
Dept: OUTPATIENT SERVICES | Facility: HOSPITAL | Age: 55
LOS: 1 days | End: 2020-03-16
Payer: COMMERCIAL

## 2020-03-16 ENCOUNTER — APPOINTMENT (OUTPATIENT)
Dept: MRI IMAGING | Facility: IMAGING CENTER | Age: 55
End: 2020-03-16
Payer: SELF-PAY

## 2020-03-16 DIAGNOSIS — M54.5 LOW BACK PAIN: ICD-10-CM

## 2020-03-16 DIAGNOSIS — M54.16 RADICULOPATHY, LUMBAR REGION: ICD-10-CM

## 2020-03-16 PROCEDURE — 72148 MRI LUMBAR SPINE W/O DYE: CPT

## 2020-03-16 PROCEDURE — 72148 MRI LUMBAR SPINE W/O DYE: CPT | Mod: 26

## 2020-03-18 ENCOUNTER — APPOINTMENT (OUTPATIENT)
Dept: PHYSICAL MEDICINE AND REHAB | Facility: CLINIC | Age: 55
End: 2020-03-18
Payer: SELF-PAY

## 2020-03-18 VITALS
RESPIRATION RATE: 20 BRPM | DIASTOLIC BLOOD PRESSURE: 87 MMHG | HEART RATE: 87 BPM | OXYGEN SATURATION: 99 % | SYSTOLIC BLOOD PRESSURE: 146 MMHG

## 2020-03-18 PROCEDURE — 99214 OFFICE O/P EST MOD 30 MIN: CPT

## 2020-03-19 NOTE — ASSESSMENT
[FreeTextEntry1] : This is MARCUS RUELAS,  a 55 year-old male with LBP after fall.  Xray from ER with no acute findings.  Pain likely 2/2 lumbar radiculopathy, lumbar spinal stenosis, SI joint pain.  Has had ongoing PT 3+ months with partial benefit.  Oral medication with mild improvement.  Despite conservative care, pain still present.\par \par Plan:\par - continue with PT + HEP\par -complete short course of meloxicam - then can take OTC NSAIDS PRN.  Denies CKD, CAD, or gastritis.  Recommend that if patient develops GI symptoms including abdominal pain, nausea, or vomiting.\par - continue with cyclobenzaprine 10 mg PO qHS PRN pain.  Patient denies a history of CHF, liver dysfunction or arrythmias.\par - start gabapentin 300 QHS x3 days, then increase to TID - medication use and side effects reviewed\par - brief discussed option of interventional procedures - but not at this state yet since he's having some improvement with PT and pain is tolerable\par \par Crow Ch MD\par Spine and Sports Medicine\par \par [employer]\par At \[facility] [place] [facility]\""/[facility]\par \par

## 2020-03-19 NOTE — PHYSICAL EXAM
[FreeTextEntry1] : General: NAD, alert\par Psych: normal mood and affect\par HEENT: NC/AT, normal visual tracking\par Pulmonary: no resp distress, chest expansion appears symmetrical\par CV: extremities are warm and perfused\par Abd: non-distended\par Ext: no c/c/e\par skin: normal color, appearance, and temperature\par \par Lumbar/Hip Spine:\par Gait - non-antalgic, able to heel and toe walk and perform functional squat\par Inspection: normal muscle bulk without asymmetry\par Tenderness to palpation: TTP over bilateral lower lumbar paraspinal, PSIS\par ROM lumbar - near full forward flexion, 5-10* extension with pain\par MMT: 5/5 bilateral lower extremities (HF, KE, KF, DF, PF, EHL)\par Reflexes: symmetric bilateral achilles and patella \par Sensory: intact to light touch in all dermatomes of the bilateral lower extremities\par Provocative testing:\par Facet loading - negative\par SLR - negative\par ASHLEY positive bilaterally, Gaenslen positive bilaterally, similar to previous exams\par Scour - negative

## 2020-03-19 NOTE — DATA REVIEWED
[Plain X-Rays] : plain X-Rays [FreeTextEntry1] : Lumbar MRI (3/2020)\par IMPRESSION: Moderate severe multilevel lumbar spondylosis with multilevel \par disc bulging and disc degeneration that is most severe at L3-L4 with there \par is disc bulging and a superimposed disc protrusion resulting in severe \par spinal stenosis and L4-L5 where there is disc bulging resulting in moderate \par to severe spinal stenosis. \par \par Lumbar Xray\par Multilevel degenerative changes including disc osteophyte \par formation, facet arthropathy, endplate sclerosis and mild loss of vertebral \par body height.

## 2020-03-19 NOTE — HISTORY OF PRESENT ILLNESS
[FreeTextEntry1] : Mr. MARCUS RUELAS is a 55 year old male here for follow up of bilateral leg L low back pain.  Reports falling ~7-8 feet off ladder and onto his back.  Went to ER at the time and had Xray (unremarkable).  Started with PT since 11/2019 with partial improvement - however reports pain regression since last Friday.  Pain feels worsened despite oral medications (meloxicam, cyclobenzaprine).  Here for MRI review which demonstrates stenosis at L3/4 > L4/5.  Has intermittent exacerbation of pain and numbness/tingling sensation to leg.  Pain radiates down bilateral posterolateral aspect of legs slightly beyond knee.\par \par Location: low back + R>L LE\par Inciting Event: fall from ladder (7/2019)\par Onset/timing: was intermittent and now fairly constant\par Quality: sharp, pressure\par Severity: 6-7/10\par Exacerbating Factor: walking ~1/2 block, standing\par Relieving factor: sitting, forward flexion\par Radiation: initially only R LE, now bilateral (R>L) posterolateral leg\par Numbness/Tingling: bilateral anterior thigh and posterior upper calf\par Cough/Sneezing: increase pressure\par Bowel/Bladder incontinence: denies\par Extremity weakness: denies\par \par Prior Treatments\par Injections: denies\par Surgery:  denies\par PT/OT: ongoing since 11/2019\par Medications: Tylenol, advil PRN, meloxicam, cyclobenzaprine\par

## 2020-03-31 ENCOUNTER — APPOINTMENT (OUTPATIENT)
Dept: PHYSICAL MEDICINE AND REHAB | Facility: CLINIC | Age: 55
End: 2020-03-31
Payer: SELF-PAY

## 2020-03-31 VITALS — SYSTOLIC BLOOD PRESSURE: 125 MMHG | DIASTOLIC BLOOD PRESSURE: 72 MMHG | HEART RATE: 104 BPM | OXYGEN SATURATION: 97 %

## 2020-03-31 PROCEDURE — 99214 OFFICE O/P EST MOD 30 MIN: CPT

## 2020-03-31 NOTE — HISTORY OF PRESENT ILLNESS
[FreeTextEntry1] : 3/31/20\par 54 yo M who presents for follow up for low back pain.  Pain had been improving.  However, patient went back to work last week and the pain has become worse.  Patient reports compliance with meloxicam, gabapentin, and cyclobenzaprine.  Patient reports taking one dose of ibuprofen with some improvement.  Patient denies new weakness, numbness or paresthesia.  Denies bowel/bladder dysfunction, fevers, chills, weight loss, night pain, or night sweats.\par \par 3/18/20\par Mr. MARCUS RUELAS is a 55 year old male here for follow up of bilateral leg L low back pain.  Reports falling ~7-8 feet off ladder and onto his back.  Went to ER at the time and had Xray (unremarkable).  Started with PT since 11/2019 with partial improvement - however reports pain regression since last Friday.  Pain feels worsened despite oral medications (meloxicam, cyclobenzaprine).  Here for MRI review which demonstrates stenosis at L3/4 > L4/5.  Has intermittent exacerbation of pain and numbness/tingling sensation to leg.  Pain radiates down bilateral posterolateral aspect of legs slightly beyond knee.\par \par Location: low back + R>L LE\par Inciting Event: fall from ladder (7/2019)\par Onset/timing: was intermittent and now fairly constant\par Quality: sharp, pressure\par Severity: 6-7/10\par Exacerbating Factor: walking ~1/2 block, standing\par Relieving factor: sitting, forward flexion\par Radiation: initially only R LE, now bilateral (R>L) posterolateral leg\par Numbness/Tingling: bilateral anterior thigh and posterior upper calf\par Cough/Sneezing: increase pressure\par Bowel/Bladder incontinence: denies\par Extremity weakness: denies\par \par Prior Treatments\par Injections: denies\par Surgery:  denies\par PT/OT: ongoing since 11/2019\par Medications: Tylenol, advil PRN, meloxicam, cyclobenzaprine\par

## 2020-03-31 NOTE — ASSESSMENT
[FreeTextEntry1] : This is MARCUS JEFFERS,  a 55 year-old male with LBP after fall.  Xray from ER with no acute findings.  Pain likely 2/2 lumbar radiculopathy, lumbar spinal stenosis, SI joint pain.  Several treatment options discussed with Mr. Jeffers.  Given the severity of recent COVID-19 outbreak and absence of neurologic dysfunction, I am reluctant to refer patient for corticosteroid injections or prescribe PO corticosteroids at this time.  Patient wishes to pursue the following treatment plan.\par \par \par Plan:\par -temporarily suspend PT for now, continue HEP.\par -Start mobic 15 mg PO qdaily x 10 days, recommend to take with food.  Denies CKD, CAD, or gastritis.  Recommend that if patient develops GI symptoms including abdominal pain, nausea, or vomiting.\par -Start methocarbamol 750 mg PO BID PRN pain, dispense 60\par -Cont. gabapentin 300 QHS x3 days, then increase to TID - medication use and side effects reviewed\par -RTC 2-3 weeks, if pain persists or worsen despite compliance with above, then will consider scheduling injection vs. prescribing PO corticosteroids at next visit pending status of covid-19 outbreak.\par \par Crow Ch MD\par Spine and Sports Medicine\par \par [employer]\par At [facility] [place]/[facility]\par \par

## 2020-03-31 NOTE — PHYSICAL EXAM
[FreeTextEntry1] : General: NAD, alert\par Psych: normal mood and affect\par HEENT: NC/AT, normal visual tracking\par Pulmonary: no resp distress, chest expansion appears symmetrical\par CV: extremities are warm and perfused\par Abd: non-distended\par Ext: no c/c/e\par skin: normal color, appearance, and temperature\par \par Lumbar/Hip Spine:\par Gait - non-antalgic, able to heel and toe walk and perform functional squat\par Inspection: normal muscle bulk without asymmetry\par Tenderness to palpation: TTP over bilateral lower lumbar paraspinal and bilateral sciatic notch\par ROM lumbar - near full forward flexion, 5-10* extension with pain\par MMT: 5/5 bilateral lower extremities (HF, KE, KF, DF, PF, EHL)\par Reflexes: symmetric bilateral achilles and patella \par Sensory: intact to light touch in all dermatomes of the bilateral lower extremities\par Provocative testing:\par Facet loading - negative\par SLR - negative\par ASHLEY positive bilaterally, Gaenslen positive bilaterally, similar to previous exams\par Scour - negative

## 2020-04-23 ENCOUNTER — APPOINTMENT (OUTPATIENT)
Dept: PHYSICAL MEDICINE AND REHAB | Facility: CLINIC | Age: 55
End: 2020-04-23
Payer: SELF-PAY

## 2020-04-23 VITALS
SYSTOLIC BLOOD PRESSURE: 145 MMHG | HEART RATE: 87 BPM | TEMPERATURE: 97.7 F | OXYGEN SATURATION: 99 % | DIASTOLIC BLOOD PRESSURE: 84 MMHG

## 2020-04-23 PROCEDURE — 99214 OFFICE O/P EST MOD 30 MIN: CPT | Mod: 25

## 2020-04-23 PROCEDURE — 96372 THER/PROPH/DIAG INJ SC/IM: CPT | Mod: LT

## 2020-04-23 NOTE — PHYSICAL EXAM
[FreeTextEntry1] : General: NAD, alert\par Psych: normal mood and affect\par HEENT: NC/AT, normal visual tracking\par Pulmonary: no resp distress, chest expansion appears symmetrical\par CV: extremities are warm and perfused\par Abd: non-distended\par Ext: no c/c/e\par skin: normal color, appearance, and temperature\par \par Lumbar/Hip Spine:\par Gait - non-antalgic, able to heel and toe walk and perform functional squat\par Inspection: normal muscle bulk without asymmetry\par Tenderness to palpation: TTP over bilateral lower lumbar paraspinal\par ROM lumbar - full\par MMT: 5/5 bilateral lower extremities (HF, KE, KF, DF, PF, EHL)\par Reflexes: symmetric bilateral achilles and patella \par Sensory: intact to light touch in all dermatomes of the bilateral lower extremities\par Provocative testing:\par Facet loading - negative\par SLR - negative\par ASHLEY positive bilaterally, Gaenslen positive bilaterally, similar to previous exams\par Scour - negative

## 2020-04-23 NOTE — PROCEDURE
[de-identified] : Procedure: IM toradol injection\par \par Shoulder- LEFT\par -Potential benefits and side effects of the procedure were explained to the patient and an opportunity for questions was provided. In addition to typical procedure related side effects, specific possible side effects from the procedure were explained including injury to the nerves, vessels/branches, and skin depigmentation/ subcutaneous atrophy from corticosteroid.\par \par -Patient positioning: seated\par \par -Outlines of deltoid muscle ascertained by palpation.\par -Skin Preparation: the overlying skin was prepped with Chloro-prep swab sticks which was allowed to dry for at least 30 seconds.\par -Then a 25 gauge 1.5 inch needle was then advanced into the left deltoid muscle using the following solution:\par -1 ml volume toradol (30 mg/ml)\par -Total volume injected: 1 ml\par -Needle position was monitored using both long-axis and short-axis visualization and adjusted as necessary and aspiration prior to injections were negative for blood return.\par -A Band-Aid was then placed over the needle entry site.\par \par Procedure summary:\par -Patient tolerance: Excellent\par -Miscellaneous technical comments: none\par \par Recommendations:\par -Ice the area for 20-30 minutes up to [id number] prn until being seen for follow-up\par -Limit use of the affected region.\par -continue with other aspects of treatment plan as described in prior office note.\par -Contact me with any questions/concerns.\par

## 2020-04-23 NOTE — ASSESSMENT
[FreeTextEntry1] : This is MARCUS JEFFERS,  a 55 year-old male with LBP after fall.  Xray from ER with no acute findings.  Pain likely 2/2 lumbar radiculopathy, lumbar spinal stenosis, SI joint pain.  Several treatment options discussed with Mr. Jeffers.  Given the severity of recent COVID-19 outbreak and absence of neurologic dysfunction, I am reluctant to refer patient for corticosteroid injections or prescribe PO corticosteroids at this time.  Patient wishes to pursue the following treatment plan.  \par \par Stressed the importance of medication compliance to achieve optimal pain relief\par \par \par Plan:\par -restart PT and HEP\par -start methocarbamol 750 mg PO BID PRN pain, dispense 60\par -start gabapentin 300 TID\par -IM toradol injection given on this visit.\par -Follow up in 3 weeks.\par \par Crow Ch MD\par Spine and Sports Medicine\par \par [employer]\par At [facility]\par \par

## 2020-04-23 NOTE — HISTORY OF PRESENT ILLNESS
[FreeTextEntry1] : 4/23/20\par 56 yo M who presents for follow up with low back pain.  Patient reports that the pain is about the same.  Patient reports intermittent compliance with gabapentin and methocarbamol.  Patient had been taking tylenol but was having trouble finding this medication in stores due to the pandemic.  Patient reports that pain is now radiating into left lower extremity.  Patient denies new weakness, numbness or paresthesia.  Denies bowel/bladder dysfunction, fevers, chills, weight loss, night pain, or night sweats.\par \par 3/31/20\par 56 yo M who presents for follow up for low back pain.  Pain had been improving.  However, patient went back to work last week and the pain has become worse.  Patient reports compliance with meloxicam, gabapentin, and cyclobenzaprine.  Patient reports taking one dose of ibuprofen with some improvement.  Patient denies new weakness, numbness or paresthesia.  Denies bowel/bladder dysfunction, fevers, chills, weight loss, night pain, or night sweats.\par \par 3/18/20\par Mr. MARCUS RUELAS is a 55 year old male here for follow up of bilateral leg L low back pain.  Reports falling ~7-8 feet off ladder and onto his back.  Went to ER at the time and had Xray (unremarkable).  Started with PT since 11/2019 with partial improvement - however reports pain regression since last Friday.  Pain feels worsened despite oral medications (meloxicam, cyclobenzaprine).  Here for MRI review which demonstrates stenosis at L3/4 > L4/5.  Has intermittent exacerbation of pain and numbness/tingling sensation to leg.  Pain radiates down bilateral posterolateral aspect of legs slightly beyond knee.\par \par Location: low back + R>L LE\par Inciting Event: fall from ladder (7/2019)\par Onset/timing: was intermittent and now fairly constant\par Quality: sharp, pressure\par Severity: 6-7/10\par Exacerbating Factor: walking ~1/2 block, standing\par Relieving factor: sitting, forward flexion\par Radiation: initially only R LE, now bilateral (R>L) posterolateral leg\par Numbness/Tingling: bilateral anterior thigh and posterior upper calf\par Cough/Sneezing: increase pressure\par Bowel/Bladder incontinence: denies\par Extremity weakness: denies\par \par Prior Treatments\par Injections: denies\par Surgery:  denies\par PT/OT: ongoing since 11/2019\par Medications: Tylenol, advil PRN, meloxicam, cyclobenzaprine\par

## 2020-05-13 ENCOUNTER — APPOINTMENT (OUTPATIENT)
Dept: PHYSICAL MEDICINE AND REHAB | Facility: CLINIC | Age: 55
End: 2020-05-13
Payer: SELF-PAY

## 2020-05-13 VITALS
SYSTOLIC BLOOD PRESSURE: 159 MMHG | DIASTOLIC BLOOD PRESSURE: 91 MMHG | HEART RATE: 88 BPM | OXYGEN SATURATION: 97 % | TEMPERATURE: 97.5 F

## 2020-05-13 PROCEDURE — 99214 OFFICE O/P EST MOD 30 MIN: CPT

## 2020-05-13 NOTE — PHYSICAL EXAM
[FreeTextEntry1] : General: NAD, alert\par Psych: normal mood and affect\par HEENT: NC/AT, normal visual tracking\par Pulmonary: no resp distress, chest expansion appears symmetrical\par CV: extremities are warm and perfused\par Abd: non-distended\par Ext: no c/c/e\par skin: normal color, appearance, and temperature\par \par Lumbar/Hip Spine:\par Gait - non-antalgic, able to heel and toe walk and perform functional squat\par Inspection: normal muscle bulk without asymmetry\par Tenderness to palpation: TTP over bilateral lower lumbar paraspinal (R > L) but improved from previous exams\par ROM lumbar - full\par MMT: 5/5 bilateral lower extremities (HF, KE, KF, DF, PF, EHL)\par Reflexes: symmetric bilateral achilles and patella \par Sensory: intact to light touch in all dermatomes of the bilateral lower extremities\par Provocative testing:\par Facet loading - negative\par SLR - negative\par ASHLEY positive bilaterally, Gaenslen positive bilaterally, similar to previous exams\par Scour - negative

## 2020-05-13 NOTE — HISTORY OF PRESENT ILLNESS
[FreeTextEntry1] : 5/13/20\par 56 yo M who presents for follow up with low back pain.  Patient reports significant improvement with previous toradol IM injection, gabapentin and cyclobenzaprine.  Patient also has restarted PT and HEP with significant improvement in his pain.  Patient denies new trauma or falls.  Patient denies new weakness, numbness or paresthesia.  Denies bowel/bladder dysfunction, fevers, chills, weight loss, night pain, or night sweats.\par \par 4/23/20\par 56 yo M who presents for follow up with low back pain.  Patient reports that the pain is about the same.  Patient reports intermittent compliance with gabapentin and methocarbamol.  Patient had been taking tylenol but was having trouble finding this medication in stores due to the pandemic.  Patient reports that pain is now radiating into left lower extremity.  Patient denies new weakness, numbness or paresthesia.  Denies bowel/bladder dysfunction, fevers, chills, weight loss, night pain, or night sweats.\par \par 3/31/20\par 56 yo M who presents for follow up for low back pain.  Pain had been improving.  However, patient went back to work last week and the pain has become worse.  Patient reports compliance with meloxicam, gabapentin, and cyclobenzaprine.  Patient reports taking one dose of ibuprofen with some improvement.  Patient denies new weakness, numbness or paresthesia.  Denies bowel/bladder dysfunction, fevers, chills, weight loss, night pain, or night sweats.\par \par 3/18/20\par Mr. MARCUS RUELAS is a 55 year old male here for follow up of bilateral leg L low back pain.  Reports falling ~7-8 feet off ladder and onto his back.  Went to ER at the time and had Xray (unremarkable).  Started with PT since 11/2019 with partial improvement - however reports pain regression since last Friday.  Pain feels worsened despite oral medications (meloxicam, cyclobenzaprine).  Here for MRI review which demonstrates stenosis at L3/4 > L4/5.  Has intermittent exacerbation of pain and numbness/tingling sensation to leg.  Pain radiates down bilateral posterolateral aspect of legs slightly beyond knee.\par \par Location: low back + R>L LE\par Inciting Event: fall from ladder (7/2019)\par Onset/timing: was intermittent and now fairly constant\par Quality: sharp, pressure\par Severity: 6-7/10\par Exacerbating Factor: walking ~1/2 block, standing\par Relieving factor: sitting, forward flexion\par Radiation: initially only R LE, now bilateral (R>L) posterolateral leg\par Numbness/Tingling: bilateral anterior thigh and posterior upper calf\par Cough/Sneezing: increase pressure\par Bowel/Bladder incontinence: denies\par Extremity weakness: denies\par \par Prior Treatments\par Injections: denies\par Surgery:  denies\par PT/OT: ongoing since 11/2019\par Medications: Tylenol, advil PRN, meloxicam, cyclobenzaprine\par

## 2020-05-13 NOTE — ASSESSMENT
[FreeTextEntry1] : This is MARCUS JEFFERS,  a 55 year-old male with LBP after fall.  Xray from ER with no acute findings.  Pain likely 2/2 lumbar radiculopathy, lumbar spinal stenosis, SI joint pain.  Several treatment options discussed with Mr. Jeffers.  Patient continues to make significant improvement with conservative care.\par \par Plan:\par -Cont PT and HEP, new referral provided\par -Increase cyclobenzaprine 10 mg PO from qHS to BID PRN pain, dispense 60.  Patient denies a history of CHF, liver dysfunction or arrhythmias.  No side effects from current dose.\par -cont. gabapentin 300 TID\par -Start mobic 15 mg PO qdaily PRN x 14 days.  Patient instructed to take prior to PT. I recommend to take with food.  Denies CKD, CAD, or gastritis.  Recommend that if patient develops GI symptoms including abdominal pain, nausea, or vomiting to discontinue use of medication immediately.\par -Follow up in 3 weeks.\par \par Crow Ch MD\par Spine and Sports Medicine\par \par [employer]\par At [facility]\par \par

## 2020-06-03 ENCOUNTER — APPOINTMENT (OUTPATIENT)
Dept: PHYSICAL MEDICINE AND REHAB | Facility: CLINIC | Age: 55
End: 2020-06-03
Payer: SELF-PAY

## 2020-06-03 VITALS
OXYGEN SATURATION: 98 % | HEART RATE: 85 BPM | TEMPERATURE: 97.9 F | SYSTOLIC BLOOD PRESSURE: 132 MMHG | DIASTOLIC BLOOD PRESSURE: 83 MMHG

## 2020-06-03 PROCEDURE — 99214 OFFICE O/P EST MOD 30 MIN: CPT

## 2020-06-03 NOTE — ASSESSMENT
[FreeTextEntry1] : This is MARCUS JEFFERS,  a 55 year-old male with LBP after fall.  Xray from ER with no acute findings.  Low back pain likely 2/2 lumbar radiculopathy, lumbar spinal stenosis significantly improved from previously.  On this visit, patient reports significant pain now in lateral aspect of hip (R > L) consistent with greater trochanteric bursitis and ITB syndrome.  Several treatment options discussed with Mr. Jeffers including US guided right GT bursa injection.  Patient elects for the following.\par \par Plan:\par -Start medrol dose pack, dispense 1 pack\par -Cont PT and HEP, new referral provided including GT bursitis and ITB syndrome\par -Cont. cyclobenzaprine 10 mg PO qHS.  Patient denies a history of CHF, liver dysfunction or arrhythmias.  No side effects from current dose.\par -cont. gabapentin 300 TID\par -Follow up in 2 weeks, if pain persists or worsen despite compliance with above, then will consider US guided right GT bursa injection if patient is amenable.\par \par Crow Ch MD\par Spine and Sports Medicine\par \par [employer]\par At [facility]\par \par

## 2020-06-03 NOTE — HISTORY OF PRESENT ILLNESS
[FreeTextEntry1] : 6/3/20\par 54 yo M who presents for follow up with low back and bilateral hip pain.  Patient reports that pain in his low back has markedly improved.  Pain is now most concentrated along the lateral aspects of bilateral hips.  Patient continues to take cyclobenzaprine with significant improvement in his pain.  Patient continues with physical therapy and HEP with significant improvement in his pain.  Patient denies new weakness, numbness or paresthesia.  Denies bowel/bladder dysfunction, fevers, chills, weight loss, night pain, or night sweats.\par \par 5/13/20\par 54 yo M who presents for follow up with low back pain.  Patient reports significant improvement with previous toradol IM injection, gabapentin and cyclobenzaprine.  Patient also has restarted PT and HEP with significant improvement in his pain.  Patient denies new trauma or falls.  Patient denies new weakness, numbness or paresthesia.  Denies bowel/bladder dysfunction, fevers, chills, weight loss, night pain, or night sweats.\par \par 4/23/20\par 54 yo M who presents for follow up with low back pain.  Patient reports that the pain is about the same.  Patient reports intermittent compliance with gabapentin and methocarbamol.  Patient had been taking tylenol but was having trouble finding this medication in stores due to the pandemic.  Patient reports that pain is now radiating into left lower extremity.  Patient denies new weakness, numbness or paresthesia.  Denies bowel/bladder dysfunction, fevers, chills, weight loss, night pain, or night sweats.\par \par 3/31/20\par 54 yo M who presents for follow up for low back pain.  Pain had been improving.  However, patient went back to work last week and the pain has become worse.  Patient reports compliance with meloxicam, gabapentin, and cyclobenzaprine.  Patient reports taking one dose of ibuprofen with some improvement.  Patient denies new weakness, numbness or paresthesia.  Denies bowel/bladder dysfunction, fevers, chills, weight loss, night pain, or night sweats.\par \par 3/18/20\par Mr. MARCUS RUELAS is a 55 year old male here for follow up of bilateral leg L low back pain.  Reports falling ~7-8 feet off ladder and onto his back.  Went to ER at the time and had Xray (unremarkable).  Started with PT since 11/2019 with partial improvement - however reports pain regression since last Friday.  Pain feels worsened despite oral medications (meloxicam, cyclobenzaprine).  Here for MRI review which demonstrates stenosis at L3/4 > L4/5.  Has intermittent exacerbation of pain and numbness/tingling sensation to leg.  Pain radiates down bilateral posterolateral aspect of legs slightly beyond knee.\par \par Location: low back + R>L LE\par Inciting Event: fall from ladder (7/2019)\par Onset/timing: was intermittent and now fairly constant\par Quality: sharp, pressure\par Severity: 6-7/10\par Exacerbating Factor: walking ~1/2 block, standing\par Relieving factor: sitting, forward flexion\par Radiation: initially only R LE, now bilateral (R>L) posterolateral leg\par Numbness/Tingling: bilateral anterior thigh and posterior upper calf\par Cough/Sneezing: increase pressure\par Bowel/Bladder incontinence: denies\par Extremity weakness: denies\par \par Prior Treatments\par Injections: denies\par Surgery:  denies\par PT/OT: ongoing since 11/2019\par Medications: Tylenol, advil PRN, meloxicam, cyclobenzaprine\par

## 2020-06-09 ENCOUNTER — OUTPATIENT (OUTPATIENT)
Dept: OUTPATIENT SERVICES | Facility: HOSPITAL | Age: 55
LOS: 1 days | End: 2020-06-09

## 2020-06-09 ENCOUNTER — APPOINTMENT (OUTPATIENT)
Dept: INTERNAL MEDICINE | Facility: CLINIC | Age: 55
End: 2020-06-09

## 2020-06-09 VITALS — HEIGHT: 66 IN | BODY MASS INDEX: 32.14 KG/M2 | WEIGHT: 200 LBS

## 2020-06-09 DIAGNOSIS — Z87.09 PERSONAL HISTORY OF OTHER DISEASES OF THE RESPIRATORY SYSTEM: ICD-10-CM

## 2020-06-09 RX ORDER — METHYLPREDNISOLONE 4 MG/1
4 TABLET ORAL
Qty: 1 | Refills: 0 | Status: COMPLETED | COMMUNITY
Start: 2020-06-03 | End: 2020-06-09

## 2020-06-09 RX ORDER — UBIDECARENONE 200 MG
CAPSULE ORAL
Refills: 0 | Status: ACTIVE | COMMUNITY

## 2020-06-09 NOTE — END OF VISIT
[] : Resident [FreeTextEntry3] : 55 M HTN, sciatica seen for telephonic visit for follow up.\par Having low back and hip pain and following with PM&R. Being treated with cyclobenzaprine, gabapentin, tylenol (1g/day). Completing medrol pack\par BPs have been controlled at home.\par Last A1c 7.0 on metformin 500mg BID.\par Agree with plan as per Dr. Carpenter.

## 2020-06-09 NOTE — REVIEW OF SYSTEMS
[Back Pain] : back pain [Negative] : Heme/Lymph [Shortness Of Breath] : no shortness of breath [Wheezing] : no wheezing [Cough] : no cough [Dyspnea on Exertion] : not dyspnea on exertion [Joint Pain] : no joint pain [Joint Stiffness] : no joint stiffness [Muscle Pain] : no muscle pain [Muscle Weakness] : no muscle weakness [Joint Swelling] : no joint swelling [Headache] : no headache [Dizziness] : no dizziness [Fainting] : no fainting [Confusion] : no confusion [Unsteady Walk] : no ataxia [Memory Loss] : no memory loss [FreeTextEntry9] : back pain as per HPI

## 2020-06-09 NOTE — ASSESSMENT
[FreeTextEntry1] : 55 year old male with HTN, HLD, T2DM, sciatica (2/2 multilevel spondylosis) Obesity and seasonal allergies here for continuation of care for his back pain.\par \par #HCM\par -pt up to date on vaccinations\par -pt up to date on age appropriate cancer screenings\par -pt to return to clinic in 10 weeks with new PCP\par \par Pt discussed with Dr Jaime. All risks and benefits were discussed with pt. Pt and attending has agreed with above assessment and plan. \par \par Edouard Carpenter MD PGY3\par Internal Medicine\par [facility]\par [phone]

## 2020-06-09 NOTE — HISTORY OF PRESENT ILLNESS
[FreeTextEntry1] : back pain [de-identified] : In light of COVID-19 pandemic, this visit was conducted through telephonic medicine. \par \par 55 year old male with HTN, HLD, T2DM, sciatica (2/2 multilevel spondylosis) Obesity and seasonal allergies here for continuation of care for his back pain.\par \par #Back Pain\par Pt reports that back pain has improved since starting with PM&R. Pt is currently on mucle relaxant for PM, NSAID for pretreatment for PT, and uses Acetaminophen 500mg BID PRN. Pt reports that he is still having pain, however, is nervous about getting addicted to Tylenol. Pt got MR recently. \par \par #HTN\par Pt taking BP at home and notes that it is consistently <130/90, except for one day where it got up to 140's, however, has not happened again. Pt continues taking medication as directed.

## 2020-06-09 NOTE — PHYSICAL EXAM
[Speech Grossly Normal] : speech grossly normal [Memory Grossly Normal] : memory grossly normal [Normal Affect] : the affect was normal [Alert and Oriented x3] : oriented to person, place, and time [Normal Insight/Judgement] : insight and judgment were intact [Normal Mood] : the mood was normal [Normal] : affect was normal and insight and judgment were intact [de-identified] : Limited at telephonic visit

## 2020-06-10 DIAGNOSIS — I10 ESSENTIAL (PRIMARY) HYPERTENSION: ICD-10-CM

## 2020-06-10 DIAGNOSIS — E78.5 HYPERLIPIDEMIA, UNSPECIFIED: ICD-10-CM

## 2020-06-10 DIAGNOSIS — E11.9 TYPE 2 DIABETES MELLITUS WITHOUT COMPLICATIONS: ICD-10-CM

## 2020-06-10 DIAGNOSIS — M54.31 SCIATICA, RIGHT SIDE: ICD-10-CM

## 2020-06-17 ENCOUNTER — APPOINTMENT (OUTPATIENT)
Dept: PHYSICAL MEDICINE AND REHAB | Facility: CLINIC | Age: 55
End: 2020-06-17
Payer: SELF-PAY

## 2020-06-17 VITALS
RESPIRATION RATE: 74 BRPM | TEMPERATURE: 96.7 F | OXYGEN SATURATION: 100 % | DIASTOLIC BLOOD PRESSURE: 86 MMHG | SYSTOLIC BLOOD PRESSURE: 137 MMHG

## 2020-06-17 PROCEDURE — 99214 OFFICE O/P EST MOD 30 MIN: CPT

## 2020-06-17 NOTE — PHYSICAL EXAM
[FreeTextEntry1] : General: NAD, alert\par Psych: normal mood and affect\par HEENT: NC/AT, normal visual tracking\par Pulmonary: no resp distress, chest expansion appears symmetrical\par CV: extremities are warm and perfused\par Abd: non-distended\par Ext: no c/c/e\par skin: normal color, appearance, and temperature\par \par Lumbar/Hip Spine:\par Gait - non-antalgic, able to heel and toe walk and perform functional squat\par Inspection: normal muscle bulk without asymmetry\par Tenderness to palpation: mild TTP over bilateral lower lumbar paraspinal (R > L) with improvement from previous exams, +TTP right > left GT bursa\par ROM lumbar - full, right hip limited most prominently in abduction and extension\par MMT: 5/5 bilateral lower extremities (HF, KE, KF, DF, PF, EHL)\par Reflexes: symmetric bilateral achilles and patella \par Sensory: intact to light touch in all dermatomes of the bilateral lower extremities\par Provocative testing:\par Facet loading - negative\par SLR - negative\par ASHLEY positive bilaterally, Gaenslen positive bilaterally, similar to previous exams\par Scour - negative

## 2020-06-17 NOTE — ASSESSMENT
[FreeTextEntry1] : This is MARCUS JEFFERS,  a 55 year-old male with LBP after fall.  Xray from ER with no acute findings.  Low back pain likely 2/2 lumbar radiculopathy, lumbar spinal stenosis, GT bursitis and bilateral ITB syndrome.  Several treatment options discussed with Mr. Jeffers including US guided right GT bursa injection and lumbar STEFFEN.  As pain and functionality seems to be improving, albeit at a slower pace than previously, Mr. Jeffers has elected to continue with conservative care with the following.  \par \par Plan:\par -Start mobic 15 mg PO qdaily x 14 days, recommend to take with food.  Denies CKD, CAD, or gastritis.  Recommend that if patient develops GI symptoms including abdominal pain, nausea, or vomiting to discontinue use of medication immediately.\par -Cont PT and HEP, new referral provided including GT bursitis and ITB syndrome\par -Cont. cyclobenzaprine 10 mg PO BID prn pain.  Patient denies a history of CHF, liver dysfunction or arrhythmias.  No side effects from current dose.\par -cont. gabapentin 300 TID\par -Follow up in 3-4 weeks, if pain persists or worsen despite compliance with above, then will consider US guided right GT bursa injection if patient is amenable.\par \par Crow Ch MD\par Spine and Sports Medicine\par \par [employer]\par At [facility]\par \par

## 2020-06-17 NOTE — HISTORY OF PRESENT ILLNESS
[FreeTextEntry1] : 6/17/20\par 54 yo M who presents for follow up with low back and bilateral hip pain.  Patient continues to make progress in PT/HEP.  Patient has been compliant with gabapentin 300 mg PO TID, cyclobenzaprine 10 mg PO BID PRN, and medrol dose pack.  Pain is now mild to moderate in intensity and is described as more of a pressure than a pain.  Patient denies new weakness, numbness or paresthesia.  Denies bowel/bladder dysfunction, fevers, chills, weight loss, night pain, or night sweats.\par \par 6/3/20\par 54 yo M who presents for follow up with low back and bilateral hip pain.  Patient reports that pain in his low back has markedly improved.  Pain is now most concentrated along the lateral aspects of bilateral hips.  Patient continues to take cyclobenzaprine with significant improvement in his pain.  Patient continues with physical therapy and HEP with significant improvement in his pain.  Patient denies new weakness, numbness or paresthesia.  Denies bowel/bladder dysfunction, fevers, chills, weight loss, night pain, or night sweats.\par \par 5/13/20\par 54 yo M who presents for follow up with low back pain.  Patient reports significant improvement with previous toradol IM injection, gabapentin and cyclobenzaprine.  Patient also has restarted PT and HEP with significant improvement in his pain.  Patient denies new trauma or falls.  Patient denies new weakness, numbness or paresthesia.  Denies bowel/bladder dysfunction, fevers, chills, weight loss, night pain, or night sweats.\par \par 4/23/20\par 54 yo M who presents for follow up with low back pain.  Patient reports that the pain is about the same.  Patient reports intermittent compliance with gabapentin and methocarbamol.  Patient had been taking tylenol but was having trouble finding this medication in stores due to the pandemic.  Patient reports that pain is now radiating into left lower extremity.  Patient denies new weakness, numbness or paresthesia.  Denies bowel/bladder dysfunction, fevers, chills, weight loss, night pain, or night sweats.\par \par 3/31/20\par 54 yo M who presents for follow up for low back pain.  Pain had been improving.  However, patient went back to work last week and the pain has become worse.  Patient reports compliance with meloxicam, gabapentin, and cyclobenzaprine.  Patient reports taking one dose of ibuprofen with some improvement.  Patient denies new weakness, numbness or paresthesia.  Denies bowel/bladder dysfunction, fevers, chills, weight loss, night pain, or night sweats.\par \par 3/18/20\par Mr. MARCUS RUELAS is a 55 year old male here for follow up of bilateral leg L low back pain.  Reports falling ~7-8 feet off ladder and onto his back.  Went to ER at the time and had Xray (unremarkable).  Started with PT since 11/2019 with partial improvement - however reports pain regression since last Friday.  Pain feels worsened despite oral medications (meloxicam, cyclobenzaprine).  Here for MRI review which demonstrates stenosis at L3/4 > L4/5.  Has intermittent exacerbation of pain and numbness/tingling sensation to leg.  Pain radiates down bilateral posterolateral aspect of legs slightly beyond knee.\par \par Location: low back + R>L LE\par Inciting Event: fall from ladder (7/2019)\par Onset/timing: was intermittent and now fairly constant\par Quality: sharp, pressure\par Severity: 6-7/10\par Exacerbating Factor: walking ~1/2 block, standing\par Relieving factor: sitting, forward flexion\par Radiation: initially only R LE, now bilateral (R>L) posterolateral leg\par Numbness/Tingling: bilateral anterior thigh and posterior upper calf\par Cough/Sneezing: increase pressure\par Bowel/Bladder incontinence: denies\par Extremity weakness: denies\par \par Prior Treatments\par Injections: denies\par Surgery:  denies\par PT/OT: ongoing since 11/2019\par Medications: Tylenol, advil PRN, meloxicam, cyclobenzaprine\par

## 2020-07-02 ENCOUNTER — RX RENEWAL (OUTPATIENT)
Age: 55
End: 2020-07-02

## 2020-07-08 ENCOUNTER — APPOINTMENT (OUTPATIENT)
Dept: PHYSICAL MEDICINE AND REHAB | Facility: CLINIC | Age: 55
End: 2020-07-08
Payer: SELF-PAY

## 2020-07-08 VITALS
SYSTOLIC BLOOD PRESSURE: 120 MMHG | OXYGEN SATURATION: 97 % | DIASTOLIC BLOOD PRESSURE: 82 MMHG | HEART RATE: 85 BPM | TEMPERATURE: 97.8 F

## 2020-07-08 PROCEDURE — 99214 OFFICE O/P EST MOD 30 MIN: CPT

## 2020-07-08 NOTE — ASSESSMENT
[FreeTextEntry1] : This is MARCUS JEFFERS,  a 55 year-old male with LBP after fall.  Xray from ER with no acute findings.  Low back pain likely 2/2 lumbar radiculopathy, lumbar spinal stenosis, GT bursitis and bilateral ITB syndrome.  Several treatment options discussed with Mr. Jeffers including lumbar STEFFEN.  While I asked Mr. Jeffers to consider scheduling for lumbar STEFFEN given the persistence of his pain despite prolonged duration of conservative care, patient wishes to hold off on further injections while slow progress is being made.\par \par Plan:\par -Cont. mobic 15 mg PO qdaily x 14 days, recommend to take with food.  Denies CKD, CAD, or gastritis.  Recommend that if patient develops GI symptoms including abdominal pain, nausea, or vomiting to discontinue use of medication immediately.\par -Cont PT and HEP, new referral provided including GT bursitis and ITB syndrome\par -Cont. cyclobenzaprine 10 mg PO BID prn pain.  Patient denies a history of CHF, liver dysfunction or arrhythmias.  No side effects from current dose.\par -cont. gabapentin 300 TID\par -Follow up in 3-4 weeks, if pain persists or worsen despite compliance with above, then will consider lumbar TFESI at next visit if patient is amenable.\par \par Crow Ch MD\par Spine and Sports Medicine\par \par [employer]\par At [facility]\par \par

## 2020-07-08 NOTE — PHYSICAL EXAM
[FreeTextEntry1] : General: NAD, alert\par Psych: normal mood and affect\par HEENT: NC/AT, normal visual tracking\par Pulmonary: no resp distress, chest expansion appears symmetrical\par CV: extremities are warm and perfused\par Abd: non-distended\par Ext: no c/c/e\par skin: normal color, appearance, and temperature\par \par Lumbar/Hip Spine:\par Gait - non-antalgic, able to heel and toe walk and perform functional squat\par Inspection: normal muscle bulk without asymmetry\par Tenderness to palpation: mild TTP over bilateral lower lumbar paraspinal (R > L) with improvement from previous exams, no tenderness over GT bursa bilaterally\par ROM lumbar - full, right hip limited most prominently in abduction and extension\par MMT: 5/5 bilateral lower extremities (HF, KE, KF, DF, PF, EHL)\par Reflexes: symmetric bilateral achilles and patella \par Sensory: intact to light touch in all dermatomes of the bilateral lower extremities\par Provocative testing:\par Facet loading - negative\par SLR - negative\par ASHLEY positive bilaterally, Gaenslen positive bilaterally, similar to previous exams\par Scour - negative

## 2020-07-08 NOTE — HISTORY OF PRESENT ILLNESS
[FreeTextEntry1] : 7/8/20\par 54 yo M who presents for follow up with low back and bilateral hip pain.  Patient reports that pain has been slowly improving since his last visit.  Patient continues to take gabapentin 300 mg PO TID, cyclobenzaprine 10 mg PO BID PRN, and participates in PT/HEP with some improvement.  Pain continues to be mild to moderate in intensity.  Patient denies new weakness, numbness or paresthesia.  Denies bowel/bladder dysfunction, fevers, chills, weight loss, night pain, or night sweats.\par \par 6/17/20\par 54 yo M who presents for follow up with low back and bilateral hip pain.  Patient continues to make progress in PT/HEP.  Patient has been compliant with gabapentin 300 mg PO TID, cyclobenzaprine 10 mg PO BID PRN, and medrol dose pack.  Pain is now mild to moderate in intensity and is described as more of a pressure than a pain.  Patient denies new weakness, numbness or paresthesia.  Denies bowel/bladder dysfunction, fevers, chills, weight loss, night pain, or night sweats.\par \par 6/3/20\par 54 yo M who presents for follow up with low back and bilateral hip pain.  Patient reports that pain in his low back has markedly improved.  Pain is now most concentrated along the lateral aspects of bilateral hips.  Patient continues to take cyclobenzaprine with significant improvement in his pain.  Patient continues with physical therapy and HEP with significant improvement in his pain.  Patient denies new weakness, numbness or paresthesia.  Denies bowel/bladder dysfunction, fevers, chills, weight loss, night pain, or night sweats.\par \par 5/13/20\par 54 yo M who presents for follow up with low back pain.  Patient reports significant improvement with previous toradol IM injection, gabapentin and cyclobenzaprine.  Patient also has restarted PT and HEP with significant improvement in his pain.  Patient denies new trauma or falls.  Patient denies new weakness, numbness or paresthesia.  Denies bowel/bladder dysfunction, fevers, chills, weight loss, night pain, or night sweats.\par \par 4/23/20\par 54 yo M who presents for follow up with low back pain.  Patient reports that the pain is about the same.  Patient reports intermittent compliance with gabapentin and methocarbamol.  Patient had been taking tylenol but was having trouble finding this medication in stores due to the pandemic.  Patient reports that pain is now radiating into left lower extremity.  Patient denies new weakness, numbness or paresthesia.  Denies bowel/bladder dysfunction, fevers, chills, weight loss, night pain, or night sweats.\par \par 3/31/20\par 54 yo M who presents for follow up for low back pain.  Pain had been improving.  However, patient went back to work last week and the pain has become worse.  Patient reports compliance with meloxicam, gabapentin, and cyclobenzaprine.  Patient reports taking one dose of ibuprofen with some improvement.  Patient denies new weakness, numbness or paresthesia.  Denies bowel/bladder dysfunction, fevers, chills, weight loss, night pain, or night sweats.\par \par 3/18/20\par Mr. MARCUS RUELAS is a 55 year old male here for follow up of bilateral leg L low back pain.  Reports falling ~7-8 feet off ladder and onto his back.  Went to ER at the time and had Xray (unremarkable).  Started with PT since 11/2019 with partial improvement - however reports pain regression since last Friday.  Pain feels worsened despite oral medications (meloxicam, cyclobenzaprine).  Here for MRI review which demonstrates stenosis at L3/4 > L4/5.  Has intermittent exacerbation of pain and numbness/tingling sensation to leg.  Pain radiates down bilateral posterolateral aspect of legs slightly beyond knee.\par \par Location: low back + R>L LE\par Inciting Event: fall from ladder (7/2019)\par Onset/timing: was intermittent and now fairly constant\par Quality: sharp, pressure\par Severity: 6-7/10\par Exacerbating Factor: walking ~1/2 block, standing\par Relieving factor: sitting, forward flexion\par Radiation: initially only R LE, now bilateral (R>L) posterolateral leg\par Numbness/Tingling: bilateral anterior thigh and posterior upper calf\par Cough/Sneezing: increase pressure\par Bowel/Bladder incontinence: denies\par Extremity weakness: denies\par \par Prior Treatments\par Injections: denies\par Surgery:  denies\par PT/OT: ongoing since 11/2019\par Medications: Tylenol, advil PRN, meloxicam, cyclobenzaprine\par

## 2020-07-29 ENCOUNTER — APPOINTMENT (OUTPATIENT)
Dept: PHYSICAL MEDICINE AND REHAB | Facility: CLINIC | Age: 55
End: 2020-07-29
Payer: SELF-PAY

## 2020-07-29 VITALS
HEART RATE: 87 BPM | SYSTOLIC BLOOD PRESSURE: 121 MMHG | BODY MASS INDEX: 30.83 KG/M2 | WEIGHT: 191 LBS | TEMPERATURE: 97.7 F | OXYGEN SATURATION: 98 % | DIASTOLIC BLOOD PRESSURE: 83 MMHG

## 2020-07-29 PROCEDURE — 99214 OFFICE O/P EST MOD 30 MIN: CPT

## 2020-07-29 NOTE — ASSESSMENT
[FreeTextEntry1] : This is MARCUS JEFFERS,  a 55 year-old male with LBP after fall.  Xray from ER with no acute findings.  Low back pain likely 2/2 lumbar radiculopathy, lumbar spinal stenosis, GT bursitis and bilateral ITB syndrome.  Several treatment options discussed with Mr. Jeffers including lumbar STEFFEN.  \par \par I had a prolonged discussion with Mr. Jeffers about his current treatment course.  Given that the pain persists despite conservative care including PT/HEP and PO medications, I recommend patient undergo lumbar STEFFEN vs. SI joint injections.  However, patient expresses reluctance to pursue these interventions despite potential benefit.  Will hold off on injections for now per patient's preference.\par \par Plan:\par -Cont. mobic 15 mg PO qdaily x 14 days, recommend to take with food.  Denies CKD, CAD, or gastritis.  Recommend that if patient develops GI symptoms including abdominal pain, nausea, or vomiting to discontinue use of medication immediately.\par -Cont PT and HEP, new referral provided \par -Cont. cyclobenzaprine 10 mg PO BID prn pain.  Patient denies a history of CHF, liver dysfunction or arrhythmias.  No side effects from current dose.\par -cont. gabapentin 300 TID\par -Follow up in 3-4 weeks, if pain persists or worsen despite compliance with above, then will consider lumbar TFESI vs. SI joint injections at next visit if patient is amenable.\par \par Crow Ch MD\par Spine and Sports Medicine\par \par [employer]\par At [facility]\par \par

## 2020-07-29 NOTE — PHYSICAL EXAM
[FreeTextEntry1] : General: NAD, alert\par Psych: normal mood and affect\par HEENT: NC/AT, normal visual tracking\par Pulmonary: no resp distress, chest expansion appears symmetrical\par CV: extremities are warm and perfused\par Abd: non-distended\par Ext: no c/c/e\par skin: normal color, appearance, and temperature\par \par Lumbar/Hip Spine:\par Gait - non-antalgic, able to heel and toe walk and perform functional squat\par Inspection: normal muscle bulk without asymmetry\par Tenderness to palpation: mild TTP over bilateral lower lumbar paraspinal (R > L) with improvement from previous exams, no tenderness over GT bursa bilaterally\par ROM lumbar - full, right hip limited most prominently in abduction and extension\par MMT: 5/5 bilateral lower extremities (HF, KE, KF, DF, PF, EHL)\par Reflexes: symmetric bilateral achilles and patella \par Sensory: intact to light touch in all dermatomes of the bilateral lower extremities\par Provocative testing:\par Facet loading - negative\par SLR - negative\par ASHLEY positive bilaterally, Gaenslen positive bilaterally, tenderness over bilateral SI joints\par Scour - negative

## 2020-07-29 NOTE — HISTORY OF PRESENT ILLNESS
[FreeTextEntry1] : 7/29/20\par 54 yo M who presents for follow up with low back and bilateral hip pain.  Patient reports that pain has been slowly improving over the course of the last 3 weeks.  Patient reports that he is currently taking gabapentin 300 mg PO TID, cyclobenzaprine 10 mg PO BID PRN with adequate pain relief.  Patient reports that he continues to participate in PT/HEP.  Patient denies new weakness, numbness or paresthesia.  Denies bowel/bladder dysfunction, fevers, chills, weight loss, night pain, or night sweats.\par \par 7/8/20\par 54 yo M who presents for follow up with low back and bilateral hip pain.  Patient reports that pain has been slowly improving since his last visit.  Patient continues to take gabapentin 300 mg PO TID, cyclobenzaprine 10 mg PO BID PRN, and participates in PT/HEP with some improvement.  Pain continues to be mild to moderate in intensity.  Patient denies new weakness, numbness or paresthesia.  Denies bowel/bladder dysfunction, fevers, chills, weight loss, night pain, or night sweats.\par \par 6/17/20\par 54 yo M who presents for follow up with low back and bilateral hip pain.  Patient continues to make progress in PT/HEP.  Patient has been compliant with gabapentin 300 mg PO TID, cyclobenzaprine 10 mg PO BID PRN, and medrol dose pack.  Pain is now mild to moderate in intensity and is described as more of a pressure than a pain.  Patient denies new weakness, numbness or paresthesia.  Denies bowel/bladder dysfunction, fevers, chills, weight loss, night pain, or night sweats.\par \par 6/3/20\par 54 yo M who presents for follow up with low back and bilateral hip pain.  Patient reports that pain in his low back has markedly improved.  Pain is now most concentrated along the lateral aspects of bilateral hips.  Patient continues to take cyclobenzaprine with significant improvement in his pain.  Patient continues with physical therapy and HEP with significant improvement in his pain.  Patient denies new weakness, numbness or paresthesia.  Denies bowel/bladder dysfunction, fevers, chills, weight loss, night pain, or night sweats.\par \par 5/13/20\par 54 yo M who presents for follow up with low back pain.  Patient reports significant improvement with previous toradol IM injection, gabapentin and cyclobenzaprine.  Patient also has restarted PT and HEP with significant improvement in his pain.  Patient denies new trauma or falls.  Patient denies new weakness, numbness or paresthesia.  Denies bowel/bladder dysfunction, fevers, chills, weight loss, night pain, or night sweats.\par \par 4/23/20\par 54 yo M who presents for follow up with low back pain.  Patient reports that the pain is about the same.  Patient reports intermittent compliance with gabapentin and methocarbamol.  Patient had been taking tylenol but was having trouble finding this medication in stores due to the pandemic.  Patient reports that pain is now radiating into left lower extremity.  Patient denies new weakness, numbness or paresthesia.  Denies bowel/bladder dysfunction, fevers, chills, weight loss, night pain, or night sweats.\par \par 3/31/20\par 54 yo M who presents for follow up for low back pain.  Pain had been improving.  However, patient went back to work last week and the pain has become worse.  Patient reports compliance with meloxicam, gabapentin, and cyclobenzaprine.  Patient reports taking one dose of ibuprofen with some improvement.  Patient denies new weakness, numbness or paresthesia.  Denies bowel/bladder dysfunction, fevers, chills, weight loss, night pain, or night sweats.\par \par 3/18/20\par Mr. MARCUS RUELAS is a 55 year old male here for follow up of bilateral leg L low back pain.  Reports falling ~7-8 feet off ladder and onto his back.  Went to ER at the time and had Xray (unremarkable).  Started with PT since 11/2019 with partial improvement - however reports pain regression since last Friday.  Pain feels worsened despite oral medications (meloxicam, cyclobenzaprine).  Here for MRI review which demonstrates stenosis at L3/4 > L4/5.  Has intermittent exacerbation of pain and numbness/tingling sensation to leg.  Pain radiates down bilateral posterolateral aspect of legs slightly beyond knee.\par \par Location: low back + R>L LE\par Inciting Event: fall from ladder (7/2019)\par Onset/timing: was intermittent and now fairly constant\par Quality: sharp, pressure\par Severity: 6-7/10\par Exacerbating Factor: walking ~1/2 block, standing\par Relieving factor: sitting, forward flexion\par Radiation: initially only R LE, now bilateral (R>L) posterolateral leg\par Numbness/Tingling: bilateral anterior thigh and posterior upper calf\par Cough/Sneezing: increase pressure\par Bowel/Bladder incontinence: denies\par Extremity weakness: denies\par \par Prior Treatments\par Injections: denies\par Surgery:  denies\par PT/OT: ongoing since 11/2019\par Medications: Tylenol, advil PRN, meloxicam, cyclobenzaprine\par

## 2020-08-12 ENCOUNTER — APPOINTMENT (OUTPATIENT)
Dept: PHYSICAL MEDICINE AND REHAB | Facility: CLINIC | Age: 55
End: 2020-08-12
Payer: SELF-PAY

## 2020-08-12 VITALS
TEMPERATURE: 96.6 F | OXYGEN SATURATION: 98 % | DIASTOLIC BLOOD PRESSURE: 73 MMHG | HEART RATE: 93 BPM | SYSTOLIC BLOOD PRESSURE: 114 MMHG

## 2020-08-12 PROCEDURE — 99214 OFFICE O/P EST MOD 30 MIN: CPT

## 2020-08-12 NOTE — PHYSICAL EXAM
[FreeTextEntry1] : General: NAD, alert\par Psych: normal mood and affect\par HEENT: NC/AT, normal visual tracking\par Pulmonary: no resp distress, chest expansion appears symmetrical\par CV: extremities are warm and perfused\par Abd: non-distended\par Ext: no c/c/e\par skin: normal color, appearance, and temperature\par \par Lumbar/Hip Spine:\par Gait - non-antalgic, able to heel and toe walk and perform functional squat\par Inspection: normal muscle bulk without asymmetry\par Tenderness to palpation: mild TTP over bilateral lower lumbar paraspinal (R > L) similar to previous exams, no tenderness over GT bursa bilaterally\par ROM lumbar - full, right hip limited most prominently in abduction and extension\par MMT: 5/5 bilateral lower extremities (HF, KE, KF, DF, PF, EHL)\par Reflexes: symmetric bilateral achilles and patella \par Sensory: intact to light touch in all dermatomes of the bilateral lower extremities\par Provocative testing:\par Facet loading - negative\par SLR - negative\par ASHLEY positive bilaterally, Gaenslen positive bilaterally, tenderness over bilateral SI joints\par Scour - negative

## 2020-08-12 NOTE — ASSESSMENT
[FreeTextEntry1] : This is MARCUS JEFFERS,  a 55 year-old male with LBP after fall.  Xray from ER with no acute findings.  Low back pain likely 2/2 lumbar radiculopathy, lumbar spinal stenosis, GT bursitis and bilateral ITB syndrome.  Several treatment options discussed with Mr. Jeffers including lumbar STEFFEN and SI joint injection.\par \par I again had a prolonged discussion with Mr. Jeffers about his current treatment course.  Given that the pain persists despite conservative care including PT/HEP and PO medications, I recommend patient undergo lumbar STEFFEN vs. SI joint injections.  However, patient expresses reluctance to pursue these interventions despite potential benefit.  Will hold off on injections for now per patient's preference.  I ask that in the interim from now until his follow up appointment that he considers spinal injections further.\par \par Plan:\par -Will give a break from PO NSAIDs given duration of use.  Will consider restarting at next visit.\par -Cont PT and HEP, new referral provided \par -Refilled cyclobenzaprine 10 mg PO BID prn pain.  Patient denies a history of CHF, liver dysfunction or arrhythmias.  No side effects from current dose.\par -Refilled gabapentin 300 TID\par -Follow up in 3-4 weeks, if pain persists or worsen despite compliance with above, then will consider lumbar TFESI vs. SI joint injections at next visit if patient is amenable.\par \par Crow Ch MD\par Spine and Sports Medicine\par \par [employer]\par At \[facility] [place] [facility]\""/[facility]\par \par

## 2020-08-12 NOTE — HISTORY OF PRESENT ILLNESS
[FreeTextEntry1] : 8/12/20\par 54 yo M who presents for follow up with low back and bilateral hip pain.  Patient reports pain is now 4-5/10 pain with some improvement since his last visit.  Patient reports compliance with PT/HEP, mobic, cyclobenzaprine, and gabapentin.  Previously unable to tolerate up titration of gabapentin due to drowsiness.  Patient denies new weakness, numbness or paresthesia.  Denies bowel/bladder dysfunction, fevers, chills, weight loss, night pain, or night sweats.\par \par 7/29/20\par 54 yo M who presents for follow up with low back and bilateral hip pain.  Patient reports that pain has been slowly improving over the course of the last 3 weeks.  Patient reports that he is currently taking gabapentin 300 mg PO TID, cyclobenzaprine 10 mg PO BID PRN with adequate pain relief.  Patient reports that he continues to participate in PT/HEP.  Patient denies new weakness, numbness or paresthesia.  Denies bowel/bladder dysfunction, fevers, chills, weight loss, night pain, or night sweats.\par \par 7/8/20\par 54 yo M who presents for follow up with low back and bilateral hip pain.  Patient reports that pain has been slowly improving since his last visit.  Patient continues to take gabapentin 300 mg PO TID, cyclobenzaprine 10 mg PO BID PRN, and participates in PT/HEP with some improvement.  Pain continues to be mild to moderate in intensity.  Patient denies new weakness, numbness or paresthesia.  Denies bowel/bladder dysfunction, fevers, chills, weight loss, night pain, or night sweats.\par \par 6/17/20\par 54 yo M who presents for follow up with low back and bilateral hip pain.  Patient continues to make progress in PT/HEP.  Patient has been compliant with gabapentin 300 mg PO TID, cyclobenzaprine 10 mg PO BID PRN, and medrol dose pack.  Pain is now mild to moderate in intensity and is described as more of a pressure than a pain.  Patient denies new weakness, numbness or paresthesia.  Denies bowel/bladder dysfunction, fevers, chills, weight loss, night pain, or night sweats.\par \par 6/3/20\par 54 yo M who presents for follow up with low back and bilateral hip pain.  Patient reports that pain in his low back has markedly improved.  Pain is now most concentrated along the lateral aspects of bilateral hips.  Patient continues to take cyclobenzaprine with significant improvement in his pain.  Patient continues with physical therapy and HEP with significant improvement in his pain.  Patient denies new weakness, numbness or paresthesia.  Denies bowel/bladder dysfunction, fevers, chills, weight loss, night pain, or night sweats.\par \par 5/13/20\par 54 yo M who presents for follow up with low back pain.  Patient reports significant improvement with previous toradol IM injection, gabapentin and cyclobenzaprine.  Patient also has restarted PT and HEP with significant improvement in his pain.  Patient denies new trauma or falls.  Patient denies new weakness, numbness or paresthesia.  Denies bowel/bladder dysfunction, fevers, chills, weight loss, night pain, or night sweats.\par \par 4/23/20\par 54 yo M who presents for follow up with low back pain.  Patient reports that the pain is about the same.  Patient reports intermittent compliance with gabapentin and methocarbamol.  Patient had been taking tylenol but was having trouble finding this medication in stores due to the pandemic.  Patient reports that pain is now radiating into left lower extremity.  Patient denies new weakness, numbness or paresthesia.  Denies bowel/bladder dysfunction, fevers, chills, weight loss, night pain, or night sweats.\par \par 3/31/20\par 54 yo M who presents for follow up for low back pain.  Pain had been improving.  However, patient went back to work last week and the pain has become worse.  Patient reports compliance with meloxicam, gabapentin, and cyclobenzaprine.  Patient reports taking one dose of ibuprofen with some improvement.  Patient denies new weakness, numbness or paresthesia.  Denies bowel/bladder dysfunction, fevers, chills, weight loss, night pain, or night sweats.\par \par 3/18/20\par Mr. MARCUS RUELAS is a 55 year old male here for follow up of bilateral leg L low back pain.  Reports falling ~7-8 feet off ladder and onto his back.  Went to ER at the time and had Xray (unremarkable).  Started with PT since 11/2019 with partial improvement - however reports pain regression since last Friday.  Pain feels worsened despite oral medications (meloxicam, cyclobenzaprine).  Here for MRI review which demonstrates stenosis at L3/4 > L4/5.  Has intermittent exacerbation of pain and numbness/tingling sensation to leg.  Pain radiates down bilateral posterolateral aspect of legs slightly beyond knee.\par \par Location: low back + R>L LE\par Inciting Event: fall from ladder (7/2019)\par Onset/timing: was intermittent and now fairly constant\par Quality: sharp, pressure\par Severity: 6-7/10\par Exacerbating Factor: walking ~1/2 block, standing\par Relieving factor: sitting, forward flexion\par Radiation: initially only R LE, now bilateral (R>L) posterolateral leg\par Numbness/Tingling: bilateral anterior thigh and posterior upper calf\par Cough/Sneezing: increase pressure\par Bowel/Bladder incontinence: denies\par Extremity weakness: denies\par \par Prior Treatments\par Injections: denies\par Surgery:  denies\par PT/OT: ongoing since 11/2019\par Medications: Tylenol, advil PRN, meloxicam, cyclobenzaprine\par

## 2020-09-09 ENCOUNTER — APPOINTMENT (OUTPATIENT)
Dept: PHYSICAL MEDICINE AND REHAB | Facility: CLINIC | Age: 55
End: 2020-09-09
Payer: SELF-PAY

## 2020-09-09 VITALS
DIASTOLIC BLOOD PRESSURE: 86 MMHG | TEMPERATURE: 97.8 F | HEART RATE: 89 BPM | OXYGEN SATURATION: 99 % | SYSTOLIC BLOOD PRESSURE: 134 MMHG

## 2020-09-09 PROCEDURE — 99214 OFFICE O/P EST MOD 30 MIN: CPT

## 2020-09-09 NOTE — ASSESSMENT
[FreeTextEntry1] : This is MARCUS JEFFERS,  a 55 year-old male with LBP after fall.  Xray from ER with no acute findings.  Low back pain likely 2/2 lumbar radiculopathy, lumbar spinal stenosis, GT bursitis and bilateral ITB syndrome.  Several treatment options discussed with Mr. Jeffers including lumbar STEFFEN and SI joint injection.\par \par I again had a prolonged discussion with Mr. Jeffers about his current treatment course.  Given that the pain persists despite conservative care including PT/HEP and PO medications, I again recommend patient undergo lumbar STEFFEN vs. SI joint injections.  However, patient expresses reluctance to pursue these interventions despite potential benefit.  Will hold off on injections for now per patient's preference.  I ask that in the interim from now until his follow up appointment that he considers spinal injections further.\par \par Plan:\par -Cont PT and HEP, new referral provided\par -Start acupuncture, new referral provided \par -Refilled cyclobenzaprine 10 mg PO BID prn pain.  Patient denies a history of CHF, liver dysfunction or arrhythmias.  No side effects from current dose.\par -Increase gabapentin 300 from TID to QID.  Patient reports no side effects on current dose.  Denies history of CKD.\par -Follow up in 3-4 weeks, if pain persists or worsen despite compliance with above, then will consider lumbar TFESI vs. SI joint injections at next visit if patient is amenable.\par \par Crow Ch MD\par Spine and Sports Medicine\par \par [employer]\par At [facility] [place]/[facility]\par \par

## 2020-09-09 NOTE — HISTORY OF PRESENT ILLNESS
[FreeTextEntry1] : 9/9/20\par 54 yo M who presents for follow up with low back and bilateral hip pain.  Patient reports low back pain is slowly improving with physical therapy and grades the pain as 2.5/10.  Pain has now spread to the right buttock over the last 2 weeks.  No inciting event or trauma.  No association with prolonged sitting or standing.  Worse with bending forward and lifting heavy items.  Patient continues taking cyclobenzaprine 10 mg PO qHS and gabapentin 300 mg PO TID with adequate improvement in his pain.  Patient denies new weakness, numbness or paresthesia.  Denies bowel/bladder dysfunction, fevers, chills, weight loss, night pain, or night sweats.\par \par 8/12/20\par 54 yo M who presents for follow up with low back and bilateral hip pain.  Patient reports pain is now 4-5/10 pain with some improvement since his last visit.  Patient reports compliance with PT/HEP, mobic, cyclobenzaprine, and gabapentin.  Previously unable to tolerate up titration of gabapentin due to drowsiness.  Patient denies new weakness, numbness or paresthesia.  Denies bowel/bladder dysfunction, fevers, chills, weight loss, night pain, or night sweats.\par \par 7/29/20\par 54 yo M who presents for follow up with low back and bilateral hip pain.  Patient reports that pain has been slowly improving over the course of the last 3 weeks.  Patient reports that he is currently taking gabapentin 300 mg PO TID, cyclobenzaprine 10 mg PO BID PRN with adequate pain relief.  Patient reports that he continues to participate in PT/HEP.  Patient denies new weakness, numbness or paresthesia.  Denies bowel/bladder dysfunction, fevers, chills, weight loss, night pain, or night sweats.\par \par 7/8/20\par 54 yo M who presents for follow up with low back and bilateral hip pain.  Patient reports that pain has been slowly improving since his last visit.  Patient continues to take gabapentin 300 mg PO TID, cyclobenzaprine 10 mg PO BID PRN, and participates in PT/HEP with some improvement.  Pain continues to be mild to moderate in intensity.  Patient denies new weakness, numbness or paresthesia.  Denies bowel/bladder dysfunction, fevers, chills, weight loss, night pain, or night sweats.\par \par 6/17/20\par 54 yo M who presents for follow up with low back and bilateral hip pain.  Patient continues to make progress in PT/HEP.  Patient has been compliant with gabapentin 300 mg PO TID, cyclobenzaprine 10 mg PO BID PRN, and medrol dose pack.  Pain is now mild to moderate in intensity and is described as more of a pressure than a pain.  Patient denies new weakness, numbness or paresthesia.  Denies bowel/bladder dysfunction, fevers, chills, weight loss, night pain, or night sweats.\par \par 6/3/20\par 54 yo M who presents for follow up with low back and bilateral hip pain.  Patient reports that pain in his low back has markedly improved.  Pain is now most concentrated along the lateral aspects of bilateral hips.  Patient continues to take cyclobenzaprine with significant improvement in his pain.  Patient continues with physical therapy and HEP with significant improvement in his pain.  Patient denies new weakness, numbness or paresthesia.  Denies bowel/bladder dysfunction, fevers, chills, weight loss, night pain, or night sweats.\par \par 5/13/20\par 54 yo M who presents for follow up with low back pain.  Patient reports significant improvement with previous toradol IM injection, gabapentin and cyclobenzaprine.  Patient also has restarted PT and HEP with significant improvement in his pain.  Patient denies new trauma or falls.  Patient denies new weakness, numbness or paresthesia.  Denies bowel/bladder dysfunction, fevers, chills, weight loss, night pain, or night sweats.\par \par 4/23/20\par 54 yo M who presents for follow up with low back pain.  Patient reports that the pain is about the same.  Patient reports intermittent compliance with gabapentin and methocarbamol.  Patient had been taking tylenol but was having trouble finding this medication in stores due to the pandemic.  Patient reports that pain is now radiating into left lower extremity.  Patient denies new weakness, numbness or paresthesia.  Denies bowel/bladder dysfunction, fevers, chills, weight loss, night pain, or night sweats.\par \par 3/31/20\par 54 yo M who presents for follow up for low back pain.  Pain had been improving.  However, patient went back to work last week and the pain has become worse.  Patient reports compliance with meloxicam, gabapentin, and cyclobenzaprine.  Patient reports taking one dose of ibuprofen with some improvement.  Patient denies new weakness, numbness or paresthesia.  Denies bowel/bladder dysfunction, fevers, chills, weight loss, night pain, or night sweats.\par \par 3/18/20\par Mr. MARCUS RUELAS is a 55 year old male here for follow up of bilateral leg L low back pain.  Reports falling ~7-8 feet off ladder and onto his back.  Went to ER at the time and had Xray (unremarkable).  Started with PT since 11/2019 with partial improvement - however reports pain regression since last Friday.  Pain feels worsened despite oral medications (meloxicam, cyclobenzaprine).  Here for MRI review which demonstrates stenosis at L3/4 > L4/5.  Has intermittent exacerbation of pain and numbness/tingling sensation to leg.  Pain radiates down bilateral posterolateral aspect of legs slightly beyond knee.\par \par Location: low back + R>L LE\par Inciting Event: fall from ladder (7/2019)\par Onset/timing: was intermittent and now fairly constant\par Quality: sharp, pressure\par Severity: 6-7/10\par Exacerbating Factor: walking ~1/2 block, standing\par Relieving factor: sitting, forward flexion\par Radiation: initially only R LE, now bilateral (R>L) posterolateral leg\par Numbness/Tingling: bilateral anterior thigh and posterior upper calf\par Cough/Sneezing: increase pressure\par Bowel/Bladder incontinence: denies\par Extremity weakness: denies\par \par Prior Treatments\par Injections: denies\par Surgery:  denies\par PT/OT: ongoing since 11/2019\par Medications: Tylenol, advil PRN, meloxicam, cyclobenzaprine\par

## 2020-09-09 NOTE — PHYSICAL EXAM
[FreeTextEntry1] : General: NAD, alert\par Psych: normal mood and affect\par HEENT: NC/AT, normal visual tracking\par Pulmonary: no resp distress, chest expansion appears symmetrical\par CV: extremities are warm and perfused\par Abd: non-distended\par Ext: no c/c/e\par skin: normal color, appearance, and temperature\par \par Lumbar/Hip Spine:\par Gait - non-antalgic, able to heel and toe walk and perform functional squat\par Inspection: normal muscle bulk without asymmetry\par Tenderness to palpation: mild TTP over bilateral lower lumbar paraspinal (R > L) similar to previous exams, no tenderness over GT bursa bilaterally, non-tender PSIS and SI joint\par ROM lumbar - full, right hip limited most prominently in abduction and extension\par MMT: 5/5 bilateral lower extremities (HF, KE, KF, DF, PF, EHL)\par Reflexes: symmetric bilateral achilles and patella \par Sensory: intact to light touch in all dermatomes of the bilateral lower extremities\par Provocative testing:\par Facet loading - negative\par SLR - negative\par ASHLEY positive bilaterally, Gaenslen positive bilaterally, tenderness over bilateral SI joints\par Scour - negative

## 2020-10-16 ENCOUNTER — APPOINTMENT (OUTPATIENT)
Dept: PHYSICAL MEDICINE AND REHAB | Facility: CLINIC | Age: 55
End: 2020-10-16
Payer: COMMERCIAL

## 2020-10-16 VITALS
SYSTOLIC BLOOD PRESSURE: 121 MMHG | OXYGEN SATURATION: 98 % | DIASTOLIC BLOOD PRESSURE: 72 MMHG | TEMPERATURE: 97.2 F | HEART RATE: 84 BPM

## 2020-10-16 PROCEDURE — 99214 OFFICE O/P EST MOD 30 MIN: CPT

## 2020-10-18 NOTE — HISTORY OF PRESENT ILLNESS
[FreeTextEntry1] : 10/16/20\par 56 yo M who presents for follow up with low back pain.  Patient reports that low back pain is about the same as on previous visit.  Patient reports that he has been compliant with PT/HEP and pain has definitely improved since his initial appointment with me.  However, patient reports that pain relief has plateaued.  With prolonged standing and walking, patient reports that pain may be significant at times.  Patient denies new weakness, numbness or paresthesia.  Denies bowel/bladder dysfunction, fevers, chills, weight loss, night pain, or night sweats. \par \par 9/9/20\par 56 yo M who presents for follow up with low back and bilateral hip pain.  Patient reports low back pain is slowly improving with physical therapy and grades the pain as 2.5/10.  Pain has now spread to the right buttock over the last 2 weeks.  No inciting event or trauma.  No association with prolonged sitting or standing.  Worse with bending forward and lifting heavy items.  Patient continues taking cyclobenzaprine 10 mg PO qHS and gabapentin 300 mg PO TID with adequate improvement in his pain.  Patient denies new weakness, numbness or paresthesia.  Denies bowel/bladder dysfunction, fevers, chills, weight loss, night pain, or night sweats.\par \par 8/12/20\par 56 yo M who presents for follow up with low back and bilateral hip pain.  Patient reports pain is now 4-5/10 pain with some improvement since his last visit.  Patient reports compliance with PT/HEP, mobic, cyclobenzaprine, and gabapentin.  Previously unable to tolerate up titration of gabapentin due to drowsiness.  Patient denies new weakness, numbness or paresthesia.  Denies bowel/bladder dysfunction, fevers, chills, weight loss, night pain, or night sweats.\par \par 7/29/20\par 56 yo M who presents for follow up with low back and bilateral hip pain.  Patient reports that pain has been slowly improving over the course of the last 3 weeks.  Patient reports that he is currently taking gabapentin 300 mg PO TID, cyclobenzaprine 10 mg PO BID PRN with adequate pain relief.  Patient reports that he continues to participate in PT/HEP.  Patient denies new weakness, numbness or paresthesia.  Denies bowel/bladder dysfunction, fevers, chills, weight loss, night pain, or night sweats.\par \par 7/8/20\par 56 yo M who presents for follow up with low back and bilateral hip pain.  Patient reports that pain has been slowly improving since his last visit.  Patient continues to take gabapentin 300 mg PO TID, cyclobenzaprine 10 mg PO BID PRN, and participates in PT/HEP with some improvement.  Pain continues to be mild to moderate in intensity.  Patient denies new weakness, numbness or paresthesia.  Denies bowel/bladder dysfunction, fevers, chills, weight loss, night pain, or night sweats.\par \par 6/17/20\par 56 yo M who presents for follow up with low back and bilateral hip pain.  Patient continues to make progress in PT/HEP.  Patient has been compliant with gabapentin 300 mg PO TID, cyclobenzaprine 10 mg PO BID PRN, and medrol dose pack.  Pain is now mild to moderate in intensity and is described as more of a pressure than a pain.  Patient denies new weakness, numbness or paresthesia.  Denies bowel/bladder dysfunction, fevers, chills, weight loss, night pain, or night sweats.\par \par 6/3/20\par 56 yo M who presents for follow up with low back and bilateral hip pain.  Patient reports that pain in his low back has markedly improved.  Pain is now most concentrated along the lateral aspects of bilateral hips.  Patient continues to take cyclobenzaprine with significant improvement in his pain.  Patient continues with physical therapy and HEP with significant improvement in his pain.  Patient denies new weakness, numbness or paresthesia.  Denies bowel/bladder dysfunction, fevers, chills, weight loss, night pain, or night sweats.\par \par 5/13/20\par 56 yo M who presents for follow up with low back pain.  Patient reports significant improvement with previous toradol IM injection, gabapentin and cyclobenzaprine.  Patient also has restarted PT and HEP with significant improvement in his pain.  Patient denies new trauma or falls.  Patient denies new weakness, numbness or paresthesia.  Denies bowel/bladder dysfunction, fevers, chills, weight loss, night pain, or night sweats.\par \par 4/23/20\par 56 yo M who presents for follow up with low back pain.  Patient reports that the pain is about the same.  Patient reports intermittent compliance with gabapentin and methocarbamol.  Patient had been taking tylenol but was having trouble finding this medication in stores due to the pandemic.  Patient reports that pain is now radiating into left lower extremity.  Patient denies new weakness, numbness or paresthesia.  Denies bowel/bladder dysfunction, fevers, chills, weight loss, night pain, or night sweats.\par \par 3/31/20\par 56 yo M who presents for follow up for low back pain.  Pain had been improving.  However, patient went back to work last week and the pain has become worse.  Patient reports compliance with meloxicam, gabapentin, and cyclobenzaprine.  Patient reports taking one dose of ibuprofen with some improvement.  Patient denies new weakness, numbness or paresthesia.  Denies bowel/bladder dysfunction, fevers, chills, weight loss, night pain, or night sweats.\par \par 3/18/20\par Mr. MARCUS RUELAS is a 55 year old male here for follow up of bilateral leg L low back pain.  Reports falling ~7-8 feet off ladder and onto his back.  Went to ER at the time and had Xray (unremarkable).  Started with PT since 11/2019 with partial improvement - however reports pain regression since last Friday.  Pain feels worsened despite oral medications (meloxicam, cyclobenzaprine).  Here for MRI review which demonstrates stenosis at L3/4 > L4/5.  Has intermittent exacerbation of pain and numbness/tingling sensation to leg.  Pain radiates down bilateral posterolateral aspect of legs slightly beyond knee.\par \par Location: low back + R>L LE\par Inciting Event: fall from ladder (7/2019)\par Onset/timing: was intermittent and now fairly constant\par Quality: sharp, pressure\par Severity: 6-7/10\par Exacerbating Factor: walking ~1/2 block, standing\par Relieving factor: sitting, forward flexion\par Radiation: initially only R LE, now bilateral (R>L) posterolateral leg\par Numbness/Tingling: bilateral anterior thigh and posterior upper calf\par Cough/Sneezing: increase pressure\par Bowel/Bladder incontinence: denies\par Extremity weakness: denies\par \par Prior Treatments\par Injections: denies\par Surgery:  denies\par PT/OT: ongoing since 11/2019\par Medications: Tylenol, advil PRN, meloxicam, cyclobenzaprine\par

## 2020-10-18 NOTE — PHYSICAL EXAM
[FreeTextEntry1] : General: NAD, alert\par Psych: normal mood and affect\par HEENT: NC/AT, normal visual tracking\par Pulmonary: no resp distress, chest expansion appears symmetrical\par CV: extremities are warm and perfused\par Abd: non-distended\par Ext: no c/c/e\par skin: normal color, appearance, and temperature\par \par Lumbar/Hip Spine:\par Gait - non-antalgic, able to heel and toe walk and perform functional squat\par Inspection: normal muscle bulk without asymmetry\par Tenderness to palpation: mild to moderate TTP over bilateral lower lumbar paraspinal (R > L) similar to previous exams, no tenderness over GT bursa bilaterally, non-tender PSIS and SI joint\par ROM lumbar - full, right hip limited most prominently in abduction and extension\par MMT: 5/5 bilateral lower extremities (HF, KE, KF, DF, PF, EHL)\par Reflexes: symmetric bilateral achilles and patella \par Sensory: intact to light touch in all dermatomes of the bilateral lower extremities\par Provocative testing:\par Facet loading - negative\par SLR - negative\par ASHLEY positive bilaterally, Gaenslen positive bilaterally, tenderness over bilateral SI joints\par Scour - negative

## 2020-10-18 NOTE — ASSESSMENT
[FreeTextEntry1] : This is MARCUS RUELAS,  a 55 year-old male with LBP after fall.  Xray from ER with no acute findings.  Low back pain likely 2/2 lumbar radiculopathy, lumbar spinal stenosis, GT bursitis and bilateral ITB syndrome.  \par \par Plan:\par -I recommend that patient undergo right L4-L5, L5-S1 TFESI.  Risks and benefits discussed with patient.  Will submit for insurance approval.  Once insurance approval has been obtained, patient will be contacted to schedule injection at out-patient ambulatory center.  Covid-19 referral provided to patient on this visit and patient has been instructed to undergo testing per unit protocol.\par -Refilled cyclobenzaprine 10 mg PO BID prn pain.  Patient denies a history of CHF, liver dysfunction or arrhythmias.  No side effects from current dose.\par -Continue gabapentin 300 QID.  Patient reports no side effects on current dose.  Denies history of CKD.\par \par Crow Ch MD\par Spine and Sports Medicine\par \par [employer]\par At [facility]\par \par

## 2020-11-08 ENCOUNTER — APPOINTMENT (OUTPATIENT)
Dept: DISASTER EMERGENCY | Facility: CLINIC | Age: 55
End: 2020-11-08

## 2020-11-09 LAB — SARS-COV-2 N GENE NPH QL NAA+PROBE: NOT DETECTED

## 2020-11-11 ENCOUNTER — APPOINTMENT (OUTPATIENT)
Dept: PHYSICAL MEDICINE AND REHAB | Facility: CLINIC | Age: 55
End: 2020-11-11
Payer: SELF-PAY

## 2020-11-11 VITALS
HEART RATE: 79 BPM | DIASTOLIC BLOOD PRESSURE: 84 MMHG | TEMPERATURE: 97.3 F | OXYGEN SATURATION: 99 % | SYSTOLIC BLOOD PRESSURE: 134 MMHG

## 2020-11-11 DIAGNOSIS — F41.9 ANXIETY DISORDER, UNSPECIFIED: ICD-10-CM

## 2020-11-11 PROCEDURE — 99214 OFFICE O/P EST MOD 30 MIN: CPT

## 2020-11-12 ENCOUNTER — OUTPATIENT (OUTPATIENT)
Dept: OUTPATIENT SERVICES | Facility: HOSPITAL | Age: 55
LOS: 1 days | End: 2020-11-12
Payer: COMMERCIAL

## 2020-11-12 ENCOUNTER — APPOINTMENT (OUTPATIENT)
Dept: ULTRASOUND IMAGING | Facility: CLINIC | Age: 55
End: 2020-11-12
Payer: COMMERCIAL

## 2020-11-12 DIAGNOSIS — M79.661 PAIN IN RIGHT LOWER LEG: ICD-10-CM

## 2020-11-12 PROCEDURE — 93970 EXTREMITY STUDY: CPT | Mod: 26

## 2020-11-12 PROCEDURE — 93970 EXTREMITY STUDY: CPT

## 2020-11-13 ENCOUNTER — APPOINTMENT (OUTPATIENT)
Dept: PHYSICAL MEDICINE AND REHAB | Facility: CLINIC | Age: 55
End: 2020-11-13

## 2020-11-13 ENCOUNTER — OUTPATIENT (OUTPATIENT)
Dept: OUTPATIENT SERVICES | Facility: HOSPITAL | Age: 55
LOS: 1 days | End: 2020-11-13
Payer: SELF-PAY

## 2020-11-13 DIAGNOSIS — M54.16 RADICULOPATHY, LUMBAR REGION: ICD-10-CM

## 2020-11-13 PROCEDURE — 64483 NJX AA&/STRD TFRM EPI L/S 1: CPT

## 2020-11-13 PROCEDURE — 64484 NJX AA&/STRD TFRM EPI L/S EA: CPT

## 2020-11-13 PROCEDURE — 64484 NJX AA&/STRD TFRM EPI L/S EA: CPT | Mod: RT

## 2020-11-13 PROCEDURE — 82962 GLUCOSE BLOOD TEST: CPT

## 2020-11-13 PROCEDURE — 64483 NJX AA&/STRD TFRM EPI L/S 1: CPT | Mod: RT

## 2020-11-15 PROBLEM — F41.9 ANXIETY: Status: ACTIVE | Noted: 2020-11-09

## 2020-11-15 NOTE — ASSESSMENT
[FreeTextEntry1] : This is MARCUS RUELAS,  a 55 year-old male with LBP after fall.  Xray from ER with no acute findings.  Low back pain likely 2/2 lumbar radiculopathy, lumbar spinal stenosis, GT bursitis and bilateral ITB syndrome.  \par \par Plan:\par -I recommend that patient undergo right L4-L5, L5-S1 TFESI.  Risks and benefits discussed with patient.  Will submit for insurance approval.  Once insurance approval has been obtained, patient will be contacted to schedule injection at out-patient ambulatory center.  Covid-19 referral provided to patient on this visit and patient has been instructed to undergo testing per unit protocol.\par -5 mg PO valium 1 tablet to be taken one hour prior to procedure.  Second 5 mg PO valium tablet prescribed and patient instructed to take this medication immediately prior to the injection if anxiety persists and side effects are tolerable from initial dose.  [place] I-stop checked and no signs of abuse.\par -Lower extremity duplex study ordered to r/o DVT\par Continue cyclobenzaprine 10 mg PO BID prn pain.  Patient denies a history of CHF, liver dysfunction or arrhythmias.  No side effects from current dose.\par -Continue gabapentin 300 QID.  Patient reports no side effects on current dose.  Denies history of CKD.\par -RTC following lower extremity duplex to r/o DVT\par \par Crow Ch MD\par Spine and Sports Medicine\par \par [employer]\par At [facility]\par \par

## 2020-11-15 NOTE — HISTORY OF PRESENT ILLNESS
[FreeTextEntry1] : 11/11/20\par 56 yo M who presents for follow up with low back pain.  Patient reports that for the past week, he has been experiencing right calf pain.  No inciting event or trauma.  Patient reports more pain at night.  Patient denies new weakness, numbness or paresthesia.  Denies bowel/bladder dysfunction, fevers, chills, weight loss, night pain, or night sweats.\par \par 10/16/20\par 56 yo M who presents for follow up with low back pain.  Patient reports that low back pain is about the same as on previous visit.  Patient reports that he has been compliant with PT/HEP and pain has definitely improved since his initial appointment with me.  However, patient reports that pain relief has plateaued.  With prolonged standing and walking, patient reports that pain may be significant at times.  Patient denies new weakness, numbness or paresthesia.  Denies bowel/bladder dysfunction, fevers, chills, weight loss, night pain, or night sweats. \par \par 9/9/20\par 56 yo M who presents for follow up with low back and bilateral hip pain.  Patient reports low back pain is slowly improving with physical therapy and grades the pain as 2.5/10.  Pain has now spread to the right buttock over the last 2 weeks.  No inciting event or trauma.  No association with prolonged sitting or standing.  Worse with bending forward and lifting heavy items.  Patient continues taking cyclobenzaprine 10 mg PO qHS and gabapentin 300 mg PO TID with adequate improvement in his pain.  Patient denies new weakness, numbness or paresthesia.  Denies bowel/bladder dysfunction, fevers, chills, weight loss, night pain, or night sweats.\par \par 8/12/20\par 56 yo M who presents for follow up with low back and bilateral hip pain.  Patient reports pain is now 4-5/10 pain with some improvement since his last visit.  Patient reports compliance with PT/HEP, mobic, cyclobenzaprine, and gabapentin.  Previously unable to tolerate up titration of gabapentin due to drowsiness.  Patient denies new weakness, numbness or paresthesia.  Denies bowel/bladder dysfunction, fevers, chills, weight loss, night pain, or night sweats.\par \par 7/29/20\par 56 yo M who presents for follow up with low back and bilateral hip pain.  Patient reports that pain has been slowly improving over the course of the last 3 weeks.  Patient reports that he is currently taking gabapentin 300 mg PO TID, cyclobenzaprine 10 mg PO BID PRN with adequate pain relief.  Patient reports that he continues to participate in PT/HEP.  Patient denies new weakness, numbness or paresthesia.  Denies bowel/bladder dysfunction, fevers, chills, weight loss, night pain, or night sweats.\par \par 7/8/20\par 56 yo M who presents for follow up with low back and bilateral hip pain.  Patient reports that pain has been slowly improving since his last visit.  Patient continues to take gabapentin 300 mg PO TID, cyclobenzaprine 10 mg PO BID PRN, and participates in PT/HEP with some improvement.  Pain continues to be mild to moderate in intensity.  Patient denies new weakness, numbness or paresthesia.  Denies bowel/bladder dysfunction, fevers, chills, weight loss, night pain, or night sweats.\par \par 6/17/20\par 56 yo M who presents for follow up with low back and bilateral hip pain.  Patient continues to make progress in PT/HEP.  Patient has been compliant with gabapentin 300 mg PO TID, cyclobenzaprine 10 mg PO BID PRN, and medrol dose pack.  Pain is now mild to moderate in intensity and is described as more of a pressure than a pain.  Patient denies new weakness, numbness or paresthesia.  Denies bowel/bladder dysfunction, fevers, chills, weight loss, night pain, or night sweats.\par \par 6/3/20\par 56 yo M who presents for follow up with low back and bilateral hip pain.  Patient reports that pain in his low back has markedly improved.  Pain is now most concentrated along the lateral aspects of bilateral hips.  Patient continues to take cyclobenzaprine with significant improvement in his pain.  Patient continues with physical therapy and HEP with significant improvement in his pain.  Patient denies new weakness, numbness or paresthesia.  Denies bowel/bladder dysfunction, fevers, chills, weight loss, night pain, or night sweats.\par \par 5/13/20\par 56 yo M who presents for follow up with low back pain.  Patient reports significant improvement with previous toradol IM injection, gabapentin and cyclobenzaprine.  Patient also has restarted PT and HEP with significant improvement in his pain.  Patient denies new trauma or falls.  Patient denies new weakness, numbness or paresthesia.  Denies bowel/bladder dysfunction, fevers, chills, weight loss, night pain, or night sweats.\par \par 4/23/20\par 56 yo M who presents for follow up with low back pain.  Patient reports that the pain is about the same.  Patient reports intermittent compliance with gabapentin and methocarbamol.  Patient had been taking tylenol but was having trouble finding this medication in stores due to the pandemic.  Patient reports that pain is now radiating into left lower extremity.  Patient denies new weakness, numbness or paresthesia.  Denies bowel/bladder dysfunction, fevers, chills, weight loss, night pain, or night sweats.\par \par 3/31/20\par 56 yo M who presents for follow up for low back pain.  Pain had been improving.  However, patient went back to work last week and the pain has become worse.  Patient reports compliance with meloxicam, gabapentin, and cyclobenzaprine.  Patient reports taking one dose of ibuprofen with some improvement.  Patient denies new weakness, numbness or paresthesia.  Denies bowel/bladder dysfunction, fevers, chills, weight loss, night pain, or night sweats.\par \par 3/18/20\par Mr. MARCUS RUELAS is a 55 year old male here for follow up of bilateral leg L low back pain.  Reports falling ~7-8 feet off ladder and onto his back.  Went to ER at the time and had Xray (unremarkable).  Started with PT since 11/2019 with partial improvement - however reports pain regression since last Friday.  Pain feels worsened despite oral medications (meloxicam, cyclobenzaprine).  Here for MRI review which demonstrates stenosis at L3/4 > L4/5.  Has intermittent exacerbation of pain and numbness/tingling sensation to leg.  Pain radiates down bilateral posterolateral aspect of legs slightly beyond knee.\par \par Location: low back + R>L LE\par Inciting Event: fall from ladder (7/2019)\par Onset/timing: was intermittent and now fairly constant\par Quality: sharp, pressure\par Severity: 6-7/10\par Exacerbating Factor: walking ~1/2 block, standing\par Relieving factor: sitting, forward flexion\par Radiation: initially only R LE, now bilateral (R>L) posterolateral leg\par Numbness/Tingling: bilateral anterior thigh and posterior upper calf\par Cough/Sneezing: increase pressure\par Bowel/Bladder incontinence: denies\par Extremity weakness: denies\par \par Prior Treatments\par Injections: denies\par Surgery:  denies\par PT/OT: ongoing since 11/2019\par Medications: Tylenol, advil PRN, meloxicam, cyclobenzaprine\par

## 2020-11-15 NOTE — PHYSICAL EXAM
[FreeTextEntry1] : General: NAD, alert\par Psych: normal mood and affect\par HEENT: NC/AT, normal visual tracking\par Pulmonary: no resp distress, chest expansion appears symmetrical\par CV: extremities are warm and perfused\par Abd: non-distended\par Ext: no c/c/e\par skin: normal color, appearance, and temperature\par \par Lumbar/Hip Spine:\par Gait - non-antalgic, able to heel and toe walk and perform functional squat\par Inspection: normal muscle bulk without asymmetry\par Tenderness to palpation: mild to moderate TTP over bilateral lower lumbar paraspinal (R > L) similar to previous exams, no tenderness over GT bursa bilaterally, non-tender PSIS and SI joint\par Right calf: no erythema, no edema, some calf tenderness\par ROM lumbar - full, right hip limited most prominently in abduction and extension\par MMT: 5/5 bilateral lower extremities (HF, KE, KF, DF, PF, EHL)\par Reflexes: symmetric bilateral achilles and patella \par Sensory: intact to light touch in all dermatomes of the bilateral lower extremities\par Provocative testing:\par Facet loading - negative\par SLR - negative\par ASHLEY positive bilaterally, Gaenslen positive bilaterally, tenderness over bilateral SI joints\par Scour - negative

## 2020-11-16 DIAGNOSIS — E11.65 TYPE 2 DIABETES MELLITUS WITH HYPERGLYCEMIA: ICD-10-CM

## 2020-11-25 ENCOUNTER — APPOINTMENT (OUTPATIENT)
Dept: PHYSICAL MEDICINE AND REHAB | Facility: CLINIC | Age: 55
End: 2020-11-25
Payer: SELF-PAY

## 2020-11-25 VITALS
TEMPERATURE: 97.6 F | HEART RATE: 85 BPM | SYSTOLIC BLOOD PRESSURE: 119 MMHG | DIASTOLIC BLOOD PRESSURE: 77 MMHG | OXYGEN SATURATION: 99 %

## 2020-11-25 PROCEDURE — 99214 OFFICE O/P EST MOD 30 MIN: CPT

## 2020-11-26 NOTE — HISTORY OF PRESENT ILLNESS
[FreeTextEntry1] : 11/25/20\par 56 yo M who presents for follow up with low back pain. Patient s/p right L4-L5, L5-S1 TFESI on 11/13/20 with significant improvement in his pain.  Patient reports a 50% pain relief following this injection.  However, patient continues to complain of severe low back pain with radiation into right lower extremity when standing.  Patient takes tylenol for the pain.  Patient denies new weakness, numbness or paresthesia.  Denies bowel/bladder dysfunction, fevers, chills, weight loss, night pain, or night sweats.\par \par 11/11/20\par 56 yo M who presents for follow up with low back pain.  Patient reports that for the past week, he has been experiencing right calf pain.  No inciting event or trauma.  Patient reports more pain at night.  Patient denies new weakness, numbness or paresthesia.  Denies bowel/bladder dysfunction, fevers, chills, weight loss, night pain, or night sweats.\par \par 10/16/20\par 56 yo M who presents for follow up with low back pain.  Patient reports that low back pain is about the same as on previous visit.  Patient reports that he has been compliant with PT/HEP and pain has definitely improved since his initial appointment with me.  However, patient reports that pain relief has plateaued.  With prolonged standing and walking, patient reports that pain may be significant at times.  Patient denies new weakness, numbness or paresthesia.  Denies bowel/bladder dysfunction, fevers, chills, weight loss, night pain, or night sweats. \par \par 9/9/20\par 56 yo M who presents for follow up with low back and bilateral hip pain.  Patient reports low back pain is slowly improving with physical therapy and grades the pain as 2.5/10.  Pain has now spread to the right buttock over the last 2 weeks.  No inciting event or trauma.  No association with prolonged sitting or standing.  Worse with bending forward and lifting heavy items.  Patient continues taking cyclobenzaprine 10 mg PO qHS and gabapentin 300 mg PO TID with adequate improvement in his pain.  Patient denies new weakness, numbness or paresthesia.  Denies bowel/bladder dysfunction, fevers, chills, weight loss, night pain, or night sweats.\par \par 8/12/20\par 56 yo M who presents for follow up with low back and bilateral hip pain.  Patient reports pain is now 4-5/10 pain with some improvement since his last visit.  Patient reports compliance with PT/HEP, mobic, cyclobenzaprine, and gabapentin.  Previously unable to tolerate up titration of gabapentin due to drowsiness.  Patient denies new weakness, numbness or paresthesia.  Denies bowel/bladder dysfunction, fevers, chills, weight loss, night pain, or night sweats.\par \par 7/29/20\par 56 yo M who presents for follow up with low back and bilateral hip pain.  Patient reports that pain has been slowly improving over the course of the last 3 weeks.  Patient reports that he is currently taking gabapentin 300 mg PO TID, cyclobenzaprine 10 mg PO BID PRN with adequate pain relief.  Patient reports that he continues to participate in PT/HEP.  Patient denies new weakness, numbness or paresthesia.  Denies bowel/bladder dysfunction, fevers, chills, weight loss, night pain, or night sweats.\par \par 7/8/20\par 56 yo M who presents for follow up with low back and bilateral hip pain.  Patient reports that pain has been slowly improving since his last visit.  Patient continues to take gabapentin 300 mg PO TID, cyclobenzaprine 10 mg PO BID PRN, and participates in PT/HEP with some improvement.  Pain continues to be mild to moderate in intensity.  Patient denies new weakness, numbness or paresthesia.  Denies bowel/bladder dysfunction, fevers, chills, weight loss, night pain, or night sweats.\par \par 6/17/20\par 56 yo M who presents for follow up with low back and bilateral hip pain.  Patient continues to make progress in PT/HEP.  Patient has been compliant with gabapentin 300 mg PO TID, cyclobenzaprine 10 mg PO BID PRN, and medrol dose pack.  Pain is now mild to moderate in intensity and is described as more of a pressure than a pain.  Patient denies new weakness, numbness or paresthesia.  Denies bowel/bladder dysfunction, fevers, chills, weight loss, night pain, or night sweats.\par \par 6/3/20\par 56 yo M who presents for follow up with low back and bilateral hip pain.  Patient reports that pain in his low back has markedly improved.  Pain is now most concentrated along the lateral aspects of bilateral hips.  Patient continues to take cyclobenzaprine with significant improvement in his pain.  Patient continues with physical therapy and HEP with significant improvement in his pain.  Patient denies new weakness, numbness or paresthesia.  Denies bowel/bladder dysfunction, fevers, chills, weight loss, night pain, or night sweats.\par \par 5/13/20\par 56 yo M who presents for follow up with low back pain.  Patient reports significant improvement with previous toradol IM injection, gabapentin and cyclobenzaprine.  Patient also has restarted PT and HEP with significant improvement in his pain.  Patient denies new trauma or falls.  Patient denies new weakness, numbness or paresthesia.  Denies bowel/bladder dysfunction, fevers, chills, weight loss, night pain, or night sweats.\par \par 4/23/20\par 56 yo M who presents for follow up with low back pain.  Patient reports that the pain is about the same.  Patient reports intermittent compliance with gabapentin and methocarbamol.  Patient had been taking tylenol but was having trouble finding this medication in stores due to the pandemic.  Patient reports that pain is now radiating into left lower extremity.  Patient denies new weakness, numbness or paresthesia.  Denies bowel/bladder dysfunction, fevers, chills, weight loss, night pain, or night sweats.\par \par 3/31/20\par 56 yo M who presents for follow up for low back pain.  Pain had been improving.  However, patient went back to work last week and the pain has become worse.  Patient reports compliance with meloxicam, gabapentin, and cyclobenzaprine.  Patient reports taking one dose of ibuprofen with some improvement.  Patient denies new weakness, numbness or paresthesia.  Denies bowel/bladder dysfunction, fevers, chills, weight loss, night pain, or night sweats.\par \par 3/18/20\par Mr. MARCUS RUELAS is a 55 year old male here for follow up of bilateral leg L low back pain.  Reports falling ~7-8 feet off ladder and onto his back.  Went to ER at the time and had Xray (unremarkable).  Started with PT since 11/2019 with partial improvement - however reports pain regression since last Friday.  Pain feels worsened despite oral medications (meloxicam, cyclobenzaprine).  Here for MRI review which demonstrates stenosis at L3/4 > L4/5.  Has intermittent exacerbation of pain and numbness/tingling sensation to leg.  Pain radiates down bilateral posterolateral aspect of legs slightly beyond knee.\par \par Location: low back + R>L LE\par Inciting Event: fall from ladder (7/2019)\par Onset/timing: was intermittent and now fairly constant\par Quality: sharp, pressure\par Severity: 6-7/10\par Exacerbating Factor: walking ~1/2 block, standing\par Relieving factor: sitting, forward flexion\par Radiation: initially only R LE, now bilateral (R>L) posterolateral leg\par Numbness/Tingling: bilateral anterior thigh and posterior upper calf\par Cough/Sneezing: increase pressure\par Bowel/Bladder incontinence: denies\par Extremity weakness: denies\par \par Prior Treatments\par Injections: denies\par Surgery:  denies\par PT/OT: ongoing since 11/2019\par Medications: Tylenol, advil PRN, meloxicam, cyclobenzaprine\par

## 2020-11-26 NOTE — PHYSICAL EXAM
[FreeTextEntry1] : General: NAD, alert\par Psych: normal mood and affect\par HEENT: NC/AT, normal visual tracking\par Pulmonary: no resp distress, chest expansion appears symmetrical\par CV: extremities are warm and perfused\par Abd: non-distended\par Ext: no c/c/e\par skin: normal color, appearance, and temperature\par \par Lumbar/Hip Spine:\par Gait - non-antalgic, able to heel and toe walk and perform functional squat\par Inspection: normal muscle bulk without asymmetry\par Tenderness to palpation: moderate TTP over bilateral lower lumbar paraspinal (R > L) similar to previous exams, no tenderness over GT bursa bilaterally, non-tender PSIS and SI joint\par Right calf: no erythema, no edema, some calf tenderness\par ROM lumbar - full, right hip limited most prominently in abduction and extension\par MMT: 5/5 bilateral lower extremities (HF, KE, KF, DF, PF, EHL)\par Reflexes: symmetric bilateral achilles and patella \par Sensory: intact to light touch in all dermatomes of the bilateral lower extremities\par Provocative testing:\par Facet loading - negative\par SLR - negative\par ASHLEY positive bilaterally, Gaenslen positive bilaterally, tenderness over bilateral SI joints\par Scour - negative

## 2020-11-26 NOTE — ASSESSMENT
[FreeTextEntry1] : This is MARCUS RUELAS,  a 55 year-old male with LBP after fall.  Xray from ER with no acute findings.  Low back pain likely 2/2 lumbar radiculopathy, lumbar spinal stenosis, GT bursitis and bilateral ITB syndrome.   Patient experienced greater than 50% pain reduction following initial lumbar STEFFEN.  I recommend undergoing follow up lumbar STEFFEN in order to achieve greater pain reduction for a longer duration of time.\par \par Plan:\par -I recommend that patient undergo repeat right L4-L5, L5-S1 TFESI.  Risks and benefits discussed with patient.  Will submit for insurance approval.  Once insurance approval has been obtained, patient will be contacted to schedule injection at out-patient ambulatory center.  Covid-19 referral provided to patient on this visit and patient has been instructed to undergo testing per unit protocol.\par -5 mg PO valium 1 tablet to be taken one hour prior to procedure.  Second 5 mg PO valium tablet prescribed and patient instructed to take this medication immediately prior to the injection if anxiety persists and side effects are tolerable from initial dose.  [place] I-stop checked and no signs of abuse.\par -Lower extremity duplex reviewed \par -Start gabapentin 300 mg PO qdaily x 3 days, thereafter 300 mg PO TID, dispense 90 tablets.  Patient denies a history of chronic kidney disease.\par \par Crow Ch MD\par Spine and Sports Medicine\par \par [employer]\par At [facility]\par \par

## 2020-12-03 ENCOUNTER — APPOINTMENT (OUTPATIENT)
Dept: INTERNAL MEDICINE | Facility: CLINIC | Age: 55
End: 2020-12-03

## 2020-12-03 ENCOUNTER — LABORATORY RESULT (OUTPATIENT)
Age: 55
End: 2020-12-03

## 2020-12-03 ENCOUNTER — OUTPATIENT (OUTPATIENT)
Dept: OUTPATIENT SERVICES | Facility: HOSPITAL | Age: 55
LOS: 1 days | End: 2020-12-03

## 2020-12-03 VITALS
DIASTOLIC BLOOD PRESSURE: 80 MMHG | OXYGEN SATURATION: 99 % | WEIGHT: 198 LBS | SYSTOLIC BLOOD PRESSURE: 131 MMHG | HEART RATE: 73 BPM | HEIGHT: 66 IN | BODY MASS INDEX: 31.82 KG/M2

## 2020-12-03 VITALS — TEMPERATURE: 95.8 F

## 2020-12-03 DIAGNOSIS — Z01.818 ENCOUNTER FOR OTHER PREPROCEDURAL EXAMINATION: ICD-10-CM

## 2020-12-03 LAB — HBA1C BLD-MCNC: 7.7 % — HIGH (ref 4–5.6)

## 2020-12-03 RX ORDER — MELOXICAM 15 MG/1
15 TABLET ORAL
Qty: 30 | Refills: 0 | Status: COMPLETED | COMMUNITY
Start: 2020-03-11 | End: 2020-12-03

## 2020-12-03 RX ORDER — METHOCARBAMOL 750 MG/1
750 TABLET, FILM COATED ORAL TWICE DAILY
Qty: 120 | Refills: 0 | Status: COMPLETED | COMMUNITY
Start: 2020-03-31 | End: 2020-12-03

## 2020-12-03 RX ORDER — DIAZEPAM 5 MG/1
5 TABLET ORAL
Qty: 2 | Refills: 0 | Status: COMPLETED | COMMUNITY
Start: 2020-11-09 | End: 2020-12-03

## 2020-12-03 RX ORDER — ASPIRIN ENTERIC COATED TABLETS 81 MG 81 MG/1
81 TABLET, DELAYED RELEASE ORAL
Qty: 90 | Refills: 1 | Status: COMPLETED | COMMUNITY
Start: 2017-01-30 | End: 2020-12-03

## 2020-12-03 RX ORDER — CYCLOBENZAPRINE HYDROCHLORIDE 10 MG/1
10 TABLET, FILM COATED ORAL TWICE DAILY
Qty: 60 | Refills: 0 | Status: COMPLETED | COMMUNITY
Start: 2020-05-13 | End: 2020-12-03

## 2020-12-03 RX ORDER — MELOXICAM 15 MG/1
15 TABLET ORAL
Qty: 14 | Refills: 0 | Status: COMPLETED | COMMUNITY
Start: 2020-05-13 | End: 2020-12-03

## 2020-12-03 RX ORDER — GABAPENTIN 300 MG/1
300 CAPSULE ORAL
Qty: 120 | Refills: 0 | Status: COMPLETED | COMMUNITY
Start: 2020-09-09 | End: 2020-12-03

## 2020-12-03 RX ORDER — CYCLOBENZAPRINE HYDROCHLORIDE 10 MG/1
10 TABLET, FILM COATED ORAL
Qty: 30 | Refills: 0 | Status: COMPLETED | COMMUNITY
Start: 2020-03-11 | End: 2020-12-03

## 2020-12-03 RX ORDER — MELOXICAM 15 MG/1
15 TABLET ORAL DAILY
Qty: 10 | Refills: 0 | Status: COMPLETED | COMMUNITY
Start: 2020-03-31 | End: 2020-12-03

## 2020-12-03 RX ORDER — GABAPENTIN 300 MG/1
300 CAPSULE ORAL 3 TIMES DAILY
Qty: 90 | Refills: 0 | Status: COMPLETED | COMMUNITY
Start: 2020-03-18 | End: 2020-12-03

## 2020-12-03 NOTE — HISTORY OF PRESENT ILLNESS
[FreeTextEntry1] : Follow up chronic medical conditions  [de-identified] : 55 year old male with HTN, HLD, T2DM, sciatica (2/2 multilevel spondylosis) Obesity and seasonal allergies here for continuation of care for his chronic medical problems.\par \par #Back pain - s/p injection by PM&R 2 weeks ago. Improved after injection but still bothering him and limiting movement/activities. Pt has not been back to work due to pain and covid. Still takes Van and tylenol, does not use flexeril or meloxicam anymore. \par \par #HTN: well controlled: at home 120's/80's; today 131/80. Taking losartan\par \par #DM: has been well controlled in past. Pt has no complaints, no s/s of hyper/hypoglycemia, requesting A1C to evaluate glucose control. Taking metformin. Also requesting ophtho referral, already planned to see podiatry. \par \par #Seasonal allergies: not active currently but uses fluticasone as needed\par \par #Obesity: weight increased from 191 to 198lb, counseling provided, planning to increase baseline daily activity.

## 2020-12-03 NOTE — ASSESSMENT
[FreeTextEntry1] : 55 year old male with HTN, HLD, T2DM, sciatica (2/2 multilevel spondylosis) Obesity and seasonal allergies here for continuation of care for his chronic medical problems.\par \par #HTN: well controlled\par - continued losartan\par - was previously on ASA, has not been taking it consistently, no hx of known CAD so informed pt to stop taking ASA\par \par #DM2: previously well controlled (last A1C 7)\par - counseled on healthy eating\par - c/w metformin\par - A1C today, Urine prot/cr ratio\par - optho referral, already has podiatry\par \par #HLD\par - c/w atorvastatin and coenzyme q\par - consider fasting lipid panel next visit\par \par #Obesity: weight gained\par - counseled on healthy eating\par - pt seeing PT for back pain, recommended doing PT exercises daily even when not at PT\par - Encouraged increasing baseline daily activity\par \par #Lumbar back pain\par - follows with PM&R\par - s/p injection\par - c/w FATIMAH 900 TID and Tylenol\par - Ordered lidocaine patches\par \par #HCM\par Flu shot today\par Tdap, Pneumovax UTD\par Cscope done 2017, next due 2027\par HIV, HCV negative \par \par Aparna Dyer PGY2\par Discussed with Dr. Brady

## 2020-12-03 NOTE — PHYSICAL EXAM
[No Spinal Tenderness] : no spinal tenderness [Normal] : affect was normal and insight and judgment were intact [de-identified] : Obese

## 2020-12-04 DIAGNOSIS — E78.5 HYPERLIPIDEMIA, UNSPECIFIED: ICD-10-CM

## 2020-12-04 DIAGNOSIS — M54.31 SCIATICA, RIGHT SIDE: ICD-10-CM

## 2020-12-04 DIAGNOSIS — E11.9 TYPE 2 DIABETES MELLITUS WITHOUT COMPLICATIONS: ICD-10-CM

## 2020-12-04 DIAGNOSIS — J30.2 OTHER SEASONAL ALLERGIC RHINITIS: ICD-10-CM

## 2020-12-04 DIAGNOSIS — E66.9 OBESITY, UNSPECIFIED: ICD-10-CM

## 2020-12-04 DIAGNOSIS — I10 ESSENTIAL (PRIMARY) HYPERTENSION: ICD-10-CM

## 2020-12-04 DIAGNOSIS — Z23 ENCOUNTER FOR IMMUNIZATION: ICD-10-CM

## 2020-12-04 LAB
CREAT UR-MCNC: 153 MG/DL — SIGNIFICANT CHANGE UP
MICROALBUMIN UR-MCNC: 9.8 MG/DL — SIGNIFICANT CHANGE UP
MICROALBUMIN/CREAT UR-RTO: 64 MG/G — HIGH (ref 0–30)

## 2020-12-13 ENCOUNTER — APPOINTMENT (OUTPATIENT)
Dept: DISASTER EMERGENCY | Facility: CLINIC | Age: 55
End: 2020-12-13

## 2020-12-14 LAB — SARS-COV-2 N GENE NPH QL NAA+PROBE: NOT DETECTED

## 2020-12-18 ENCOUNTER — APPOINTMENT (OUTPATIENT)
Dept: PHYSICAL MEDICINE AND REHAB | Facility: CLINIC | Age: 55
End: 2020-12-18

## 2020-12-18 ENCOUNTER — OUTPATIENT (OUTPATIENT)
Dept: OUTPATIENT SERVICES | Facility: HOSPITAL | Age: 55
LOS: 1 days | End: 2020-12-18
Payer: SELF-PAY

## 2020-12-18 DIAGNOSIS — M54.16 RADICULOPATHY, LUMBAR REGION: ICD-10-CM

## 2020-12-18 PROCEDURE — 64484 NJX AA&/STRD TFRM EPI L/S EA: CPT | Mod: RT

## 2020-12-18 PROCEDURE — 64484 NJX AA&/STRD TFRM EPI L/S EA: CPT

## 2020-12-18 PROCEDURE — 64483 NJX AA&/STRD TFRM EPI L/S 1: CPT

## 2020-12-18 PROCEDURE — 64483 NJX AA&/STRD TFRM EPI L/S 1: CPT | Mod: RT

## 2020-12-29 DIAGNOSIS — M54.17 RADICULOPATHY, LUMBOSACRAL REGION: ICD-10-CM

## 2020-12-31 ENCOUNTER — APPOINTMENT (OUTPATIENT)
Dept: PHYSICAL MEDICINE AND REHAB | Facility: CLINIC | Age: 55
End: 2020-12-31
Payer: SELF-PAY

## 2020-12-31 VITALS
HEART RATE: 84 BPM | OXYGEN SATURATION: 97 % | TEMPERATURE: 97.2 F | DIASTOLIC BLOOD PRESSURE: 79 MMHG | SYSTOLIC BLOOD PRESSURE: 131 MMHG

## 2020-12-31 PROCEDURE — 99214 OFFICE O/P EST MOD 30 MIN: CPT

## 2021-01-03 NOTE — ASSESSMENT
[FreeTextEntry1] : This is MARCUS RUELAS,  a 55 year-old male with LBP after fall.  Xray from ER with no acute findings.  Low back pain likely 2/2 lumbar radiculopathy, lumbar spinal stenosis, GT bursitis and bilateral ITB syndrome.   Patient experienced greater than 50% pain reduction following initial lumbar STEFFEN and repeat lumbar STEFFEN but with persistent pain localized to right lower extremity.\par \par Plan:\par - Increase gabapentin to 300mg four times daily. Patient denies a history of chronic kidney disease.  No side \par - Continue PT/HEP. New prescription provided.\par - Obtain Right knee xrays to evaluate new right knee pain\par - Follow up in clinic for EMG to evaluate left thigh numbness and right calf radicular symptoms\par \par Crow Ch MD\par Spine and Sports Medicine\par \par [employer] [facility] [employer]\par At [facility]\par \par

## 2021-01-03 NOTE — PHYSICAL EXAM
[FreeTextEntry1] : General: NAD, alert\par Psych: normal mood and affect\par HEENT: NC/AT, normal visual tracking\par Pulmonary: no respiratory distress, chest expansion appears symmetrical\par CV: extremities are warm and perfused\par Abd: non-distended\par Ext: no c/c/e\par skin: normal color, appearance, and temperature\par \par Lumbar/Hip Spine:\par Gait - non-antalgic, able to heel and toe walk and perform functional squat, Leans left with standing\par Inspection: normal muscle bulk without asymmetry\par Tenderness to palpation: moderate TTP over bilateral lower lumbar paraspinal (R > L) improved from previous exams, no tenderness over GT bursa bilaterally, non-tender PSIS and SI joint\par Right knee: tender of anteromedial aspect\par Right calf: no erythema, no edema, some calf tenderness\par ROM lumbar - full, right hip limited most prominently in abduction and extension\par MMT: 4/5 hip flexion bilaterally, all others 5/5 bilateral lower extremities (KE, KF, DF, PF, EHL)\par Reflexes: symmetric bilateral achilles and patella \par Sensory: intact to light touch in all dermatomes of the bilateral lower extremities\par Provocative testing:\par Facet loading - negative\par SLR - negative\par ASHLEY positive bilaterally, Gaenslen positive bilaterally, tenderness over bilateral SI joints\par Scour - negative

## 2021-01-03 NOTE — HISTORY OF PRESENT ILLNESS
[FreeTextEntry1] : 12/31/20\par 54 yo M who presents for follow up with low back pain. Patient s/p second right L4-L5, L5-S1 TFESI on 12/18/20 (first on 11/13/20) with significant improvement in his pain.  Patient reports greater than 50% pain relief of low back pain following this second injection.  However, patient continues to have burning sensation in right calf that is 5/10 at worst, 2/10 currently.  Patient takes gabapentin three times daily and tylenol as needed for the pain. Patient reports new right knee pain. He continues to go to physical therapy four times per week. Patient reports numbness in left thigh after walking 10 minutes, relieved with rest, present for several months. No new numbness or tingling in right lower extremity.  Patient denies new weakness or paresthesia.  Denies bowel/bladder dysfunction, fevers, chills, weight loss, night pain, or night sweats.\par \par 11/25/20\par 54 yo M who presents for follow up with low back pain. Patient s/p right L4-L5, L5-S1 TFESI on 11/13/20 with significant improvement in his pain.  Patient reports a 50% pain relief following this injection.  However, patient continues to complain of severe low back pain with radiation into right lower extremity when standing.  Patient takes tylenol for the pain.  Patient denies new weakness, numbness or paresthesia.  Denies bowel/bladder dysfunction, fevers, chills, weight loss, night pain, or night sweats.\par \par 11/11/20\par 54 yo M who presents for follow up with low back pain.  Patient reports that for the past week, he has been experiencing right calf pain.  No inciting event or trauma.  Patient reports more pain at night.  Patient denies new weakness, numbness or paresthesia.  Denies bowel/bladder dysfunction, fevers, chills, weight loss, night pain, or night sweats.\par \par 10/16/20\par 54 yo M who presents for follow up with low back pain.  Patient reports that low back pain is about the same as on previous visit.  Patient reports that he has been compliant with PT/HEP and pain has definitely improved since his initial appointment with me.  However, patient reports that pain relief has plateaued.  With prolonged standing and walking, patient reports that pain may be significant at times.  Patient denies new weakness, numbness or paresthesia.  Denies bowel/bladder dysfunction, fevers, chills, weight loss, night pain, or night sweats. \par \par 9/9/20\par 54 yo M who presents for follow up with low back and bilateral hip pain.  Patient reports low back pain is slowly improving with physical therapy and grades the pain as 2.5/10.  Pain has now spread to the right buttock over the last 2 weeks.  No inciting event or trauma.  No association with prolonged sitting or standing.  Worse with bending forward and lifting heavy items.  Patient continues taking cyclobenzaprine 10 mg PO qHS and gabapentin 300 mg PO TID with adequate improvement in his pain.  Patient denies new weakness, numbness or paresthesia.  Denies bowel/bladder dysfunction, fevers, chills, weight loss, night pain, or night sweats.\par \par 8/12/20\par 54 yo M who presents for follow up with low back and bilateral hip pain.  Patient reports pain is now 4-5/10 pain with some improvement since his last visit.  Patient reports compliance with PT/HEP, mobic, cyclobenzaprine, and gabapentin.  Previously unable to tolerate up titration of gabapentin due to drowsiness.  Patient denies new weakness, numbness or paresthesia.  Denies bowel/bladder dysfunction, fevers, chills, weight loss, night pain, or night sweats.\par \par 7/29/20\par 54 yo M who presents for follow up with low back and bilateral hip pain.  Patient reports that pain has been slowly improving over the course of the last 3 weeks.  Patient reports that he is currently taking gabapentin 300 mg PO TID, cyclobenzaprine 10 mg PO BID PRN with adequate pain relief.  Patient reports that he continues to participate in PT/HEP.  Patient denies new weakness, numbness or paresthesia.  Denies bowel/bladder dysfunction, fevers, chills, weight loss, night pain, or night sweats.\par \par 7/8/20\par 54 yo M who presents for follow up with low back and bilateral hip pain.  Patient reports that pain has been slowly improving since his last visit.  Patient continues to take gabapentin 300 mg PO TID, cyclobenzaprine 10 mg PO BID PRN, and participates in PT/HEP with some improvement.  Pain continues to be mild to moderate in intensity.  Patient denies new weakness, numbness or paresthesia.  Denies bowel/bladder dysfunction, fevers, chills, weight loss, night pain, or night sweats.\par \par 6/17/20\par 54 yo M who presents for follow up with low back and bilateral hip pain.  Patient continues to make progress in PT/HEP.  Patient has been compliant with gabapentin 300 mg PO TID, cyclobenzaprine 10 mg PO BID PRN, and medrol dose pack.  Pain is now mild to moderate in intensity and is described as more of a pressure than a pain.  Patient denies new weakness, numbness or paresthesia.  Denies bowel/bladder dysfunction, fevers, chills, weight loss, night pain, or night sweats.\par \par 6/3/20\par 54 yo M who presents for follow up with low back and bilateral hip pain.  Patient reports that pain in his low back has markedly improved.  Pain is now most concentrated along the lateral aspects of bilateral hips.  Patient continues to take cyclobenzaprine with significant improvement in his pain.  Patient continues with physical therapy and HEP with significant improvement in his pain.  Patient denies new weakness, numbness or paresthesia.  Denies bowel/bladder dysfunction, fevers, chills, weight loss, night pain, or night sweats.\par \par 5/13/20\par 54 yo M who presents for follow up with low back pain.  Patient reports significant improvement with previous toradol IM injection, gabapentin and cyclobenzaprine.  Patient also has restarted PT and HEP with significant improvement in his pain.  Patient denies new trauma or falls.  Patient denies new weakness, numbness or paresthesia.  Denies bowel/bladder dysfunction, fevers, chills, weight loss, night pain, or night sweats.\par \par 4/23/20\par 54 yo M who presents for follow up with low back pain.  Patient reports that the pain is about the same.  Patient reports intermittent compliance with gabapentin and methocarbamol.  Patient had been taking tylenol but was having trouble finding this medication in stores due to the pandemic.  Patient reports that pain is now radiating into left lower extremity.  Patient denies new weakness, numbness or paresthesia.  Denies bowel/bladder dysfunction, fevers, chills, weight loss, night pain, or night sweats.\par \par 3/31/20\par 54 yo M who presents for follow up for low back pain.  Pain had been improving.  However, patient went back to work last week and the pain has become worse.  Patient reports compliance with meloxicam, gabapentin, and cyclobenzaprine.  Patient reports taking one dose of ibuprofen with some improvement.  Patient denies new weakness, numbness or paresthesia.  Denies bowel/bladder dysfunction, fevers, chills, weight loss, night pain, or night sweats.\par \par 3/18/20\par Mr. MARCUS RUELAS is a 55 year old male here for follow up of bilateral leg L low back pain.  Reports falling ~7-8 feet off ladder and onto his back.  Went to ER at the time and had Xray (unremarkable).  Started with PT since 11/2019 with partial improvement - however reports pain regression since last Friday.  Pain feels worsened despite oral medications (meloxicam, cyclobenzaprine).  Here for MRI review which demonstrates stenosis at L3/4 > L4/5.  Has intermittent exacerbation of pain and numbness/tingling sensation to leg.  Pain radiates down bilateral posterolateral aspect of legs slightly beyond knee.\par \par Location: low back + R>L LE\par Inciting Event: fall from ladder (7/2019)\par Onset/timing: was intermittent and now fairly constant\par Quality: sharp, pressure\par Severity: 6-7/10\par Exacerbating Factor: walking ~1/2 block, standing\par Relieving factor: sitting, forward flexion\par Radiation: initially only R LE, now bilateral (R>L) posterolateral leg\par Numbness/Tingling: bilateral anterior thigh and posterior upper calf\par Cough/Sneezing: increase pressure\par Bowel/Bladder incontinence: denies\par Extremity weakness: denies\par \par Prior Treatments\par Injections: denies\par Surgery:  denies\par PT/OT: ongoing since 11/2019\par Medications: Tylenol, advil PRN, meloxicam, cyclobenzaprine\par

## 2021-01-06 ENCOUNTER — APPOINTMENT (OUTPATIENT)
Dept: PHYSICAL MEDICINE AND REHAB | Facility: CLINIC | Age: 56
End: 2021-01-06
Payer: SELF-PAY

## 2021-01-06 PROCEDURE — 95886 MUSC TEST DONE W/N TEST COMP: CPT

## 2021-01-06 PROCEDURE — 95912 NRV CNDJ TEST 11-12 STUDIES: CPT

## 2021-01-06 NOTE — HISTORY OF PRESENT ILLNESS
[FreeTextEntry1] : 1/6/21\par 54 yo who presents with low back pain with radiation into his right lower extremity who presents for EMG/NCS.\par \par 12/31/20\par 54 yo M who presents for follow up with low back pain. Patient s/p second right L4-L5, L5-S1 TFESI on 12/18/20 (first on 11/13/20) with significant improvement in his pain.  Patient reports greater than 50% pain relief of low back pain following this second injection.  However, patient continues to have burning sensation in right calf that is 5/10 at worst, 2/10 currently.  Patient takes gabapentin three times daily and tylenol as needed for the pain. Patient reports new right knee pain. He continues to go to physical therapy four times per week. Patient reports numbness in left thigh after walking 10 minutes, relieved with rest, present for several months. No new numbness or tingling in right lower extremity.  Patient denies new weakness or paresthesia.  Denies bowel/bladder dysfunction, fevers, chills, weight loss, night pain, or night sweats.\par \par 11/25/20\par 54 yo M who presents for follow up with low back pain. Patient s/p right L4-L5, L5-S1 TFESI on 11/13/20 with significant improvement in his pain.  Patient reports a 50% pain relief following this injection.  However, patient continues to complain of severe low back pain with radiation into right lower extremity when standing.  Patient takes tylenol for the pain.  Patient denies new weakness, numbness or paresthesia.  Denies bowel/bladder dysfunction, fevers, chills, weight loss, night pain, or night sweats.\par \par 11/11/20\par 54 yo M who presents for follow up with low back pain.  Patient reports that for the past week, he has been experiencing right calf pain.  No inciting event or trauma.  Patient reports more pain at night.  Patient denies new weakness, numbness or paresthesia.  Denies bowel/bladder dysfunction, fevers, chills, weight loss, night pain, or night sweats.\par \par 10/16/20\par 54 yo M who presents for follow up with low back pain.  Patient reports that low back pain is about the same as on previous visit.  Patient reports that he has been compliant with PT/HEP and pain has definitely improved since his initial appointment with me.  However, patient reports that pain relief has plateaued.  With prolonged standing and walking, patient reports that pain may be significant at times.  Patient denies new weakness, numbness or paresthesia.  Denies bowel/bladder dysfunction, fevers, chills, weight loss, night pain, or night sweats. \par \par 9/9/20\par 54 yo M who presents for follow up with low back and bilateral hip pain.  Patient reports low back pain is slowly improving with physical therapy and grades the pain as 2.5/10.  Pain has now spread to the right buttock over the last 2 weeks.  No inciting event or trauma.  No association with prolonged sitting or standing.  Worse with bending forward and lifting heavy items.  Patient continues taking cyclobenzaprine 10 mg PO qHS and gabapentin 300 mg PO TID with adequate improvement in his pain.  Patient denies new weakness, numbness or paresthesia.  Denies bowel/bladder dysfunction, fevers, chills, weight loss, night pain, or night sweats.\par \par 8/12/20\par 54 yo M who presents for follow up with low back and bilateral hip pain.  Patient reports pain is now 4-5/10 pain with some improvement since his last visit.  Patient reports compliance with PT/HEP, mobic, cyclobenzaprine, and gabapentin.  Previously unable to tolerate up titration of gabapentin due to drowsiness.  Patient denies new weakness, numbness or paresthesia.  Denies bowel/bladder dysfunction, fevers, chills, weight loss, night pain, or night sweats.\par \par 7/29/20\par 54 yo M who presents for follow up with low back and bilateral hip pain.  Patient reports that pain has been slowly improving over the course of the last 3 weeks.  Patient reports that he is currently taking gabapentin 300 mg PO TID, cyclobenzaprine 10 mg PO BID PRN with adequate pain relief.  Patient reports that he continues to participate in PT/HEP.  Patient denies new weakness, numbness or paresthesia.  Denies bowel/bladder dysfunction, fevers, chills, weight loss, night pain, or night sweats.\par \par 7/8/20\par 54 yo M who presents for follow up with low back and bilateral hip pain.  Patient reports that pain has been slowly improving since his last visit.  Patient continues to take gabapentin 300 mg PO TID, cyclobenzaprine 10 mg PO BID PRN, and participates in PT/HEP with some improvement.  Pain continues to be mild to moderate in intensity.  Patient denies new weakness, numbness or paresthesia.  Denies bowel/bladder dysfunction, fevers, chills, weight loss, night pain, or night sweats.\par \par 6/17/20\par 54 yo M who presents for follow up with low back and bilateral hip pain.  Patient continues to make progress in PT/HEP.  Patient has been compliant with gabapentin 300 mg PO TID, cyclobenzaprine 10 mg PO BID PRN, and medrol dose pack.  Pain is now mild to moderate in intensity and is described as more of a pressure than a pain.  Patient denies new weakness, numbness or paresthesia.  Denies bowel/bladder dysfunction, fevers, chills, weight loss, night pain, or night sweats.\par \par 6/3/20\par 54 yo M who presents for follow up with low back and bilateral hip pain.  Patient reports that pain in his low back has markedly improved.  Pain is now most concentrated along the lateral aspects of bilateral hips.  Patient continues to take cyclobenzaprine with significant improvement in his pain.  Patient continues with physical therapy and HEP with significant improvement in his pain.  Patient denies new weakness, numbness or paresthesia.  Denies bowel/bladder dysfunction, fevers, chills, weight loss, night pain, or night sweats.\par \par 5/13/20\par 54 yo M who presents for follow up with low back pain.  Patient reports significant improvement with previous toradol IM injection, gabapentin and cyclobenzaprine.  Patient also has restarted PT and HEP with significant improvement in his pain.  Patient denies new trauma or falls.  Patient denies new weakness, numbness or paresthesia.  Denies bowel/bladder dysfunction, fevers, chills, weight loss, night pain, or night sweats.\par \par 4/23/20\par 54 yo M who presents for follow up with low back pain.  Patient reports that the pain is about the same.  Patient reports intermittent compliance with gabapentin and methocarbamol.  Patient had been taking tylenol but was having trouble finding this medication in stores due to the pandemic.  Patient reports that pain is now radiating into left lower extremity.  Patient denies new weakness, numbness or paresthesia.  Denies bowel/bladder dysfunction, fevers, chills, weight loss, night pain, or night sweats.\par \par 3/31/20\par 54 yo M who presents for follow up for low back pain.  Pain had been improving.  However, patient went back to work last week and the pain has become worse.  Patient reports compliance with meloxicam, gabapentin, and cyclobenzaprine.  Patient reports taking one dose of ibuprofen with some improvement.  Patient denies new weakness, numbness or paresthesia.  Denies bowel/bladder dysfunction, fevers, chills, weight loss, night pain, or night sweats.\par \par 3/18/20\par Mr. MARCUS RUELAS is a 55 year old male here for follow up of bilateral leg L low back pain.  Reports falling ~7-8 feet off ladder and onto his back.  Went to ER at the time and had Xray (unremarkable).  Started with PT since 11/2019 with partial improvement - however reports pain regression since last Friday.  Pain feels worsened despite oral medications (meloxicam, cyclobenzaprine).  Here for MRI review which demonstrates stenosis at L3/4 > L4/5.  Has intermittent exacerbation of pain and numbness/tingling sensation to leg.  Pain radiates down bilateral posterolateral aspect of legs slightly beyond knee.\par \par Location: low back + R>L LE\par Inciting Event: fall from ladder (7/2019)\par Onset/timing: was intermittent and now fairly constant\par Quality: sharp, pressure\par Severity: 6-7/10\par Exacerbating Factor: walking ~1/2 block, standing\par Relieving factor: sitting, forward flexion\par Radiation: initially only R LE, now bilateral (R>L) posterolateral leg\par Numbness/Tingling: bilateral anterior thigh and posterior upper calf\par Cough/Sneezing: increase pressure\par Bowel/Bladder incontinence: denies\par Extremity weakness: denies\par \par Prior Treatments\par Injections: denies\par Surgery:  denies\par PT/OT: ongoing since 11/2019\par Medications: Tylenol, advil PRN, meloxicam, cyclobenzaprine\par  Presents to ER reporting an allergy to carrot juice this morning.  Pt reports that he cant breathe through his nose.  Left lower lid noted to be edematous. Denies SOB/diff swallowing.

## 2021-01-06 NOTE — ASSESSMENT
[FreeTextEntry1] : This is MARCUS RUELAS,  a 55 year-old male with LBP after fall.  Xray from ER with no acute findings.  Low back pain likely 2/2 lumbar radiculopathy, lumbar spinal stenosis, GT bursitis and bilateral ITB syndrome.   Patient experienced greater than 50% pain reduction following initial lumbar STEFFEN and repeat lumbar STEFFEN but with persistent pain localized to right lower extremity.\par \par EMG/NCS study performed showing electrodiagnostic evidence of a right L4 and L5 lumbar radiculopathies.  Full EMG report to follow.\par \par Plan:\par - Continue gabapentin to 300mg four times daily. Patient denies a history of chronic kidney disease.  No side effects from current dose\par - Continue PT/HEP. New prescription provided.\par - Obtain Right knee xrays to evaluate new right knee pain\par -I recommend that patient undergo right L4-L5, L5-S1 TFESI.  Risks and benefits discussed with patient.  Will submit for insurance approval.  Once insurance approval has been obtained, patient will be contacted to schedule injection at out-patient ambulatory center.  Covid-19 referral provided to patient on this visit and patient has been instructed to undergo testing per unit protocol.\par \par Crow Ch MD\par Spine and Sports Medicine\par \par [employer]\par At [facility]\par \par

## 2021-01-11 ENCOUNTER — LABORATORY RESULT (OUTPATIENT)
Age: 56
End: 2021-01-11

## 2021-01-12 ENCOUNTER — APPOINTMENT (OUTPATIENT)
Dept: DISASTER EMERGENCY | Facility: CLINIC | Age: 56
End: 2021-01-12

## 2021-01-15 ENCOUNTER — APPOINTMENT (OUTPATIENT)
Dept: PHYSICAL MEDICINE AND REHAB | Facility: CLINIC | Age: 56
End: 2021-01-15

## 2021-01-15 ENCOUNTER — OUTPATIENT (OUTPATIENT)
Dept: OUTPATIENT SERVICES | Facility: HOSPITAL | Age: 56
LOS: 1 days | End: 2021-01-15
Payer: SELF-PAY

## 2021-01-15 DIAGNOSIS — M54.16 RADICULOPATHY, LUMBAR REGION: ICD-10-CM

## 2021-01-15 PROCEDURE — 64483 NJX AA&/STRD TFRM EPI L/S 1: CPT | Mod: RT

## 2021-01-15 PROCEDURE — 64483 NJX AA&/STRD TFRM EPI L/S 1: CPT

## 2021-01-15 PROCEDURE — 82962 GLUCOSE BLOOD TEST: CPT

## 2021-01-15 PROCEDURE — 64484 NJX AA&/STRD TFRM EPI L/S EA: CPT

## 2021-01-15 PROCEDURE — 64484 NJX AA&/STRD TFRM EPI L/S EA: CPT | Mod: RT

## 2021-01-19 DIAGNOSIS — E11.65 TYPE 2 DIABETES MELLITUS WITH HYPERGLYCEMIA: ICD-10-CM

## 2021-01-29 ENCOUNTER — APPOINTMENT (OUTPATIENT)
Dept: PHYSICAL MEDICINE AND REHAB | Facility: CLINIC | Age: 56
End: 2021-01-29
Payer: SELF-PAY

## 2021-01-29 VITALS
TEMPERATURE: 96.4 F | DIASTOLIC BLOOD PRESSURE: 75 MMHG | SYSTOLIC BLOOD PRESSURE: 133 MMHG | OXYGEN SATURATION: 96 % | HEART RATE: 67 BPM

## 2021-01-29 PROCEDURE — 99214 OFFICE O/P EST MOD 30 MIN: CPT

## 2021-01-30 NOTE — PHYSICAL EXAM
[FreeTextEntry1] : General: NAD, alert\par Psych: normal mood and affect\par HEENT: NC/AT, normal visual tracking\par Pulmonary: no respiratory distress, chest expansion appears symmetrical\par CV: extremities are warm and perfused\par Abd: non-distended\par Ext: no c/c/e\par skin: normal color, appearance, and temperature\par \par Lumbar/Hip Spine:\par Gait - non-antalgic, able to heel and toe walk and perform functional squat, Leans left with standing\par Inspection: normal muscle bulk without asymmetry\par Tenderness to palpation: mild TTP over bilateral lower lumbar paraspinal (R > L) improved from previous exams, no tenderness over GT bursa bilaterally, non-tender PSIS and SI joint\par Right knee: tender of anteromedial aspect\par Right calf: no erythema, no edema, some calf tenderness\par ROM lumbar - full, right hip limited most prominently in abduction and extension\par MMT: 5/5 bilateral lower extremities (HF, KE, KF, DF, PF, EHL)\par Reflexes: symmetric bilateral achilles and patella \par Sensory: intact to light touch in all dermatomes of the bilateral lower extremities\par Provocative testing:\par Facet loading - negative\par SLR - negative\par ASHLEY positive bilaterally, Gaenslen positive bilaterally, tenderness over bilateral SI joints\par Scour - negative

## 2021-01-30 NOTE — HISTORY OF PRESENT ILLNESS
[FreeTextEntry1] : 1/29/21\par 54 yo M who presents for follow up with low back pain with radiation into his right lower extremity.  Patient s/p series of 3 right L4-L5, L5-S1 TFESI with the latest occurring on 1/15/21 with almost complete resolution of his pain.  Pain rated as 1-2/10 in severity and intermittent.  Patient continues to have some pain that radiates into the lateral calf and ankle.  Patient had been experiencing some right knee pain prior to his injection, but pain has resolved fully following lumbar STEFFEN.  Patient reports monitoring his BS following these injections and no episodes of hyperglycemia or fluctuations in BS.\par \par Patient also reports left lateral and anterior thigh numbness.  Patient reports that this symptom has been present for the last few weeks and is worse with standing.  Patient reports no pain along with these symptoms.  No significant weight gain.  Denies bowel/bladder dysfunction, fevers, chills, weight loss, night pain, or night sweats.\par \par 1/6/21\par 54 yo who presents with low back pain with radiation into his right lower extremity who presents for EMG/NCS.\par \par 12/31/20\par 54 yo M who presents for follow up with low back pain. Patient s/p second right L4-L5, L5-S1 TFESI on 12/18/20 (first on 11/13/20) with significant improvement in his pain.  Patient reports greater than 50% pain relief of low back pain following this second injection.  However, patient continues to have burning sensation in right calf that is 5/10 at worst, 2/10 currently.  Patient takes gabapentin three times daily and tylenol as needed for the pain. Patient reports new right knee pain. He continues to go to physical therapy four times per week. Patient reports numbness in left thigh after walking 10 minutes, relieved with rest, present for several months. No new numbness or tingling in right lower extremity.  Patient denies new weakness or paresthesia.  Denies bowel/bladder dysfunction, fevers, chills, weight loss, night pain, or night sweats.\par \par 11/25/20\par 54 yo M who presents for follow up with low back pain. Patient s/p right L4-L5, L5-S1 TFESI on 11/13/20 with significant improvement in his pain.  Patient reports a 50% pain relief following this injection.  However, patient continues to complain of severe low back pain with radiation into right lower extremity when standing.  Patient takes tylenol for the pain.  Patient denies new weakness, numbness or paresthesia.  Denies bowel/bladder dysfunction, fevers, chills, weight loss, night pain, or night sweats.\par \par 11/11/20\par 54 yo M who presents for follow up with low back pain.  Patient reports that for the past week, he has been experiencing right calf pain.  No inciting event or trauma.  Patient reports more pain at night.  Patient denies new weakness, numbness or paresthesia.  Denies bowel/bladder dysfunction, fevers, chills, weight loss, night pain, or night sweats.\par \par 10/16/20\par 54 yo M who presents for follow up with low back pain.  Patient reports that low back pain is about the same as on previous visit.  Patient reports that he has been compliant with PT/HEP and pain has definitely improved since his initial appointment with me.  However, patient reports that pain relief has plateaued.  With prolonged standing and walking, patient reports that pain may be significant at times.  Patient denies new weakness, numbness or paresthesia.  Denies bowel/bladder dysfunction, fevers, chills, weight loss, night pain, or night sweats. \par \par 9/9/20\par 54 yo M who presents for follow up with low back and bilateral hip pain.  Patient reports low back pain is slowly improving with physical therapy and grades the pain as 2.5/10.  Pain has now spread to the right buttock over the last 2 weeks.  No inciting event or trauma.  No association with prolonged sitting or standing.  Worse with bending forward and lifting heavy items.  Patient continues taking cyclobenzaprine 10 mg PO qHS and gabapentin 300 mg PO TID with adequate improvement in his pain.  Patient denies new weakness, numbness or paresthesia.  Denies bowel/bladder dysfunction, fevers, chills, weight loss, night pain, or night sweats.\par \par 8/12/20\par 54 yo M who presents for follow up with low back and bilateral hip pain.  Patient reports pain is now 4-5/10 pain with some improvement since his last visit.  Patient reports compliance with PT/HEP, mobic, cyclobenzaprine, and gabapentin.  Previously unable to tolerate up titration of gabapentin due to drowsiness.  Patient denies new weakness, numbness or paresthesia.  Denies bowel/bladder dysfunction, fevers, chills, weight loss, night pain, or night sweats.\par \par 7/29/20\par 54 yo M who presents for follow up with low back and bilateral hip pain.  Patient reports that pain has been slowly improving over the course of the last 3 weeks.  Patient reports that he is currently taking gabapentin 300 mg PO TID, cyclobenzaprine 10 mg PO BID PRN with adequate pain relief.  Patient reports that he continues to participate in PT/HEP.  Patient denies new weakness, numbness or paresthesia.  Denies bowel/bladder dysfunction, fevers, chills, weight loss, night pain, or night sweats.\par \par 7/8/20\par 54 yo M who presents for follow up with low back and bilateral hip pain.  Patient reports that pain has been slowly improving since his last visit.  Patient continues to take gabapentin 300 mg PO TID, cyclobenzaprine 10 mg PO BID PRN, and participates in PT/HEP with some improvement.  Pain continues to be mild to moderate in intensity.  Patient denies new weakness, numbness or paresthesia.  Denies bowel/bladder dysfunction, fevers, chills, weight loss, night pain, or night sweats.\par \par 6/17/20\par 54 yo M who presents for follow up with low back and bilateral hip pain.  Patient continues to make progress in PT/HEP.  Patient has been compliant with gabapentin 300 mg PO TID, cyclobenzaprine 10 mg PO BID PRN, and medrol dose pack.  Pain is now mild to moderate in intensity and is described as more of a pressure than a pain.  Patient denies new weakness, numbness or paresthesia.  Denies bowel/bladder dysfunction, fevers, chills, weight loss, night pain, or night sweats.\par \par 6/3/20\par 54 yo M who presents for follow up with low back and bilateral hip pain.  Patient reports that pain in his low back has markedly improved.  Pain is now most concentrated along the lateral aspects of bilateral hips.  Patient continues to take cyclobenzaprine with significant improvement in his pain.  Patient continues with physical therapy and HEP with significant improvement in his pain.  Patient denies new weakness, numbness or paresthesia.  Denies bowel/bladder dysfunction, fevers, chills, weight loss, night pain, or night sweats.\par \par 5/13/20\par 54 yo M who presents for follow up with low back pain.  Patient reports significant improvement with previous toradol IM injection, gabapentin and cyclobenzaprine.  Patient also has restarted PT and HEP with significant improvement in his pain.  Patient denies new trauma or falls.  Patient denies new weakness, numbness or paresthesia.  Denies bowel/bladder dysfunction, fevers, chills, weight loss, night pain, or night sweats.\par \par 4/23/20\par 54 yo M who presents for follow up with low back pain.  Patient reports that the pain is about the same.  Patient reports intermittent compliance with gabapentin and methocarbamol.  Patient had been taking tylenol but was having trouble finding this medication in stores due to the pandemic.  Patient reports that pain is now radiating into left lower extremity.  Patient denies new weakness, numbness or paresthesia.  Denies bowel/bladder dysfunction, fevers, chills, weight loss, night pain, or night sweats.\par \par 3/31/20\par 54 yo M who presents for follow up for low back pain.  Pain had been improving.  However, patient went back to work last week and the pain has become worse.  Patient reports compliance with meloxicam, gabapentin, and cyclobenzaprine.  Patient reports taking one dose of ibuprofen with some improvement.  Patient denies new weakness, numbness or paresthesia.  Denies bowel/bladder dysfunction, fevers, chills, weight loss, night pain, or night sweats.\par \par 3/18/20\par Mr. MARCUS RUELAS is a 55 year old male here for follow up of bilateral leg L low back pain.  Reports falling ~7-8 feet off ladder and onto his back.  Went to ER at the time and had Xray (unremarkable).  Started with PT since 11/2019 with partial improvement - however reports pain regression since last Friday.  Pain feels worsened despite oral medications (meloxicam, cyclobenzaprine).  Here for MRI review which demonstrates stenosis at L3/4 > L4/5.  Has intermittent exacerbation of pain and numbness/tingling sensation to leg.  Pain radiates down bilateral posterolateral aspect of legs slightly beyond knee.\par \par Location: low back + R>L LE\par Inciting Event: fall from ladder (7/2019)\par Onset/timing: was intermittent and now fairly constant\par Quality: sharp, pressure\par Severity: 6-7/10\par Exacerbating Factor: walking ~1/2 block, standing\par Relieving factor: sitting, forward flexion\par Radiation: initially only R LE, now bilateral (R>L) posterolateral leg\par Numbness/Tingling: bilateral anterior thigh and posterior upper calf\par Cough/Sneezing: increase pressure\par Bowel/Bladder incontinence: denies\par Extremity weakness: denies\par \par Prior Treatments\par Injections: denies\par Surgery:  denies\par PT/OT: ongoing since 11/2019\par Medications: Tylenol, advil PRN, meloxicam, cyclobenzaprine\par

## 2021-01-30 NOTE — ASSESSMENT
[FreeTextEntry1] : This is MARCUS RUELAS,  a 55 year-old male with LBP after fall.  Xray from ER with no acute findings.  Low back pain likely 2/2 lumbar radiculopathy, lumbar spinal stenosis, GT bursitis and bilateral ITB syndrome.  Patient reports near resolution of his pain (1-2/10) following series of 3 x lumbar STEFFEN.  Will hold off on further CSI for now.  Right knee pain has since resolved following lumbar STEFFEN suggesting that pain likely mediated by lumbar radiculopathy.  Will hold off on further imaging and treatment for right knee pain for now.  Patient with new left thigh numbness consistent with meralgia paresthetica.\par \par Plan:\par -EMG/NCS reviewed\par -MRI lumbar spine w/o contrast reviewed\par - Renewed gabapentin to 300mg four times daily. Patient denies a history of chronic kidney disease.  No side effects from current dose\par -Start mobic 15 mg PO qdaily x 14 days, recommend to take with food.  Denies CKD, CAD, or gastritis.  Recommend that if patient develops GI symptoms including abdominal pain, nausea, or vomiting to discontinue use of medication immediately.\par -Start omeprazole 20 mg PO BID while on PO NSAIDs\par - Continue PT/HEP. New prescription provided.\par -Recommend conservative treatment for meralgia paresthetica including wearing of loose fitting clothing and weight loss.  Referral provided to Dietician to discuss weight loss strategies.\par -RTC 4-6 weeks.\par \par Crow Ch MD\par Spine and Sports Medicine\par \par [employer]\par At [facility]\par \par

## 2021-02-26 ENCOUNTER — APPOINTMENT (OUTPATIENT)
Dept: PHYSICAL MEDICINE AND REHAB | Facility: CLINIC | Age: 56
End: 2021-02-26
Payer: MEDICAID

## 2021-02-26 VITALS
TEMPERATURE: 95.18 F | OXYGEN SATURATION: 96 % | HEART RATE: 74 BPM | SYSTOLIC BLOOD PRESSURE: 128 MMHG | DIASTOLIC BLOOD PRESSURE: 83 MMHG

## 2021-02-26 PROCEDURE — 99214 OFFICE O/P EST MOD 30 MIN: CPT

## 2021-02-28 NOTE — PHYSICAL EXAM
[FreeTextEntry1] : General: NAD, alert\par Psych: normal mood and affect\par HEENT: NC/AT, normal visual tracking\par Pulmonary: no respiratory distress, chest expansion appears symmetrical\par CV: extremities are warm and perfused\par Abd: non-distended\par Ext: no c/c/e\par skin: normal color, appearance, and temperature\par \par Lumbar/Hip Spine:\par Gait - non-antalgic, able to heel and toe walk and perform functional squat, Leans left with standing\par Inspection: normal muscle bulk without asymmetry\par Tenderness to palpation: mild TTP over bilateral lower lumbar paraspinal (R > L) improved from previous exams, no tenderness over GT bursa bilaterally, non-tender PSIS and SI joint\par ROM lumbar - full, right hip limited most prominently in abduction and extension\par MMT: 5/5 bilateral lower extremities (HF, KE, KF, DF, PF, EHL)\par Reflexes: symmetric bilateral achilles and patella \par Sensory: intact to light touch in all dermatomes of the bilateral lower extremities\par Provocative testing:\par Facet loading - negative\par SLR - negative\par ASHLEY positive bilaterally, Gaenslen positive bilaterally, tenderness over bilateral SI joints\par Scour - negative\par \par Left knee: ROM limited by pain, minimal edema, no appreciable skin changes, tenderness to medial joint lines, neg shantel, neg varus/valgus, neg lachman, neg ant/post drawer\par Right knee: ROM limited by pain, minimal edema, no appreciable skin changes, tenderness to medial joint lines, neg shantel, neg varus/valgus, neg lachman, neg ant/post drawer\par

## 2021-02-28 NOTE — ASSESSMENT
[FreeTextEntry1] : This is MARCUS RUELAS,  a 55 year-old male with LBP after fall.  Xray from ER with no acute findings.  Low back pain likely 2/2 lumbar radiculopathy, lumbar spinal stenosis, GT bursitis and bilateral ITB syndrome.  Patient reports near resolution of his pain (1-2/10) following series of 3 x lumbar STEFFEN.  Will hold off on further CSI for now.  Patient with new left thigh numbness consistent with meralgia paresthetica.  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 Patient denies a history of chronic kidney disease.  No side effects from current dose\par -Renewed mobic 15 mg PO qdaily x 14 days, recommend to take with food.  Denies CKD, CAD, or gastritis.  Recommend that if patient develops GI symptoms including abdominal pain, nausea, or vomiting to discontinue use of medication immediately.\par -Recommend omeprazole 20 mg PO BID while on PO NSAIDs\par - Continue PT/HEP. New prescription provided.\par -Recommend conservative treatment for meralgia paresthetica including wearing of loose fitting clothing and weight loss.  Referral provided to Dietician to discuss weight loss strategies.\par -RTC following XR to review results\par \par Crow Ch MD\par Spine and Sports Medicine\par \par [employer]\par At [facility]\par \par

## 2021-02-28 NOTE — HISTORY OF PRESENT ILLNESS
[FreeTextEntry1] : 2/26/21\par 54 yo M who presents for follow up with low back pain with radiation into bilateral (L > R) lower extremities.  Patient reports on this visit more numbness and pain in the anterior and lateral thighs.  Patient had been prescribed meloxicam but his pharmacy was out of this medication.  Patient reports that pharmacy should have this medication next week.  Patient reports that he continues with PT/HEP.  \par \par Of note, patient also reports some mild to moderate bilateral knee pain.  Pain has been present for a couple of years.  No inciting event or trauma.  No buckliing, locking, or clicking. Patient denies new weakness, numbness or paresthesia.  Denies bowel/bladder dysfunction, fevers, chills, weight loss, night pain, or night sweats.\par \par \par 1/29/21\par 54 yo M who presents for follow up with low back pain with radiation into his right lower extremity.  Patient s/p series of 3 right L4-L5, L5-S1 TFESI with the latest occurring on 1/15/21 with almost complete resolution of his pain.  Pain rated as 1-2/10 in severity and intermittent.  Patient continues to have some pain that radiates into the lateral calf and ankle.  Patient had been experiencing some right knee pain prior to his injection, but pain has resolved fully following lumbar STEFFEN.  Patient reports monitoring his BS following these injections and no episodes of hyperglycemia or fluctuations in BS.\par \par Patient also reports left lateral and anterior thigh numbness.  Patient reports that this symptom has been present for the last few weeks and is worse with standing.  Patient reports no pain along with these symptoms.  No significant weight gain.  Denies bowel/bladder dysfunction, fevers, chills, weight loss, night pain, or night sweats.\par \par 1/6/21\par 54 yo who presents with low back pain with radiation into his right lower extremity who presents for EMG/NCS.\par \par 12/31/20\par 54 yo M who presents for follow up with low back pain. Patient s/p second right L4-L5, L5-S1 TFESI on 12/18/20 (first on 11/13/20) with significant improvement in his pain.  Patient reports greater than 50% pain relief of low back pain following this second injection.  However, patient continues to have burning sensation in right calf that is 5/10 at worst, 2/10 currently.  Patient takes gabapentin three times daily and tylenol as needed for the pain. Patient reports new right knee pain. He continues to go to physical therapy four times per week. Patient reports numbness in left thigh after walking 10 minutes, relieved with rest, present for several months. No new numbness or tingling in right lower extremity.  Patient denies new weakness or paresthesia.  Denies bowel/bladder dysfunction, fevers, chills, weight loss, night pain, or night sweats.\par \par 11/25/20\par 54 yo M who presents for follow up with low back pain. Patient s/p right L4-L5, L5-S1 TFESI on 11/13/20 with significant improvement in his pain.  Patient reports a 50% pain relief following this injection.  However, patient continues to complain of severe low back pain with radiation into right lower extremity when standing.  Patient takes tylenol for the pain.  Patient denies new weakness, numbness or paresthesia.  Denies bowel/bladder dysfunction, fevers, chills, weight loss, night pain, or night sweats.\par \par 11/11/20\par 54 yo M who presents for follow up with low back pain.  Patient reports that for the past week, he has been experiencing right calf pain.  No inciting event or trauma.  Patient reports more pain at night.  Patient denies new weakness, numbness or paresthesia.  Denies bowel/bladder dysfunction, fevers, chills, weight loss, night pain, or night sweats.\par \par 10/16/20\par 54 yo M who presents for follow up with low back pain.  Patient reports that low back pain is about the same as on previous visit.  Patient reports that he has been compliant with PT/HEP and pain has definitely improved since his initial appointment with me.  However, patient reports that pain relief has plateaued.  With prolonged standing and walking, patient reports that pain may be significant at times.  Patient denies new weakness, numbness or paresthesia.  Denies bowel/bladder dysfunction, fevers, chills, weight loss, night pain, or night sweats. \par \par 9/9/20\par 54 yo M who presents for follow up with low back and bilateral hip pain.  Patient reports low back pain is slowly improving with physical therapy and grades the pain as 2.5/10.  Pain has now spread to the right buttock over the last 2 weeks.  No inciting event or trauma.  No association with prolonged sitting or standing.  Worse with bending forward and lifting heavy items.  Patient continues taking cyclobenzaprine 10 mg PO qHS and gabapentin 300 mg PO TID with adequate improvement in his pain.  Patient denies new weakness, numbness or paresthesia.  Denies bowel/bladder dysfunction, fevers, chills, weight loss, night pain, or night sweats.\par \par 8/12/20\par 54 yo M who presents for follow up with low back and bilateral hip pain.  Patient reports pain is now 4-5/10 pain with some improvement since his last visit.  Patient reports compliance with PT/HEP, mobic, cyclobenzaprine, and gabapentin.  Previously unable to tolerate up titration of gabapentin due to drowsiness.  Patient denies new weakness, numbness or paresthesia.  Denies bowel/bladder dysfunction, fevers, chills, weight loss, night pain, or night sweats.\par \par 7/29/20\par 54 yo M who presents for follow up with low back and bilateral hip pain.  Patient reports that pain has been slowly improving over the course of the last 3 weeks.  Patient reports that he is currently taking gabapentin 300 mg PO TID, cyclobenzaprine 10 mg PO BID PRN with adequate pain relief.  Patient reports that he continues to participate in PT/HEP.  Patient denies new weakness, numbness or paresthesia.  Denies bowel/bladder dysfunction, fevers, chills, weight loss, night pain, or night sweats.\par \par 7/8/20\par 54 yo M who presents for follow up with low back and bilateral hip pain.  Patient reports that pain has been slowly improving since his last visit.  Patient continues to take gabapentin 300 mg PO TID, cyclobenzaprine 10 mg PO BID PRN, and participates in PT/HEP with some improvement.  Pain continues to be mild to moderate in intensity.  Patient denies new weakness, numbness or paresthesia.  Denies bowel/bladder dysfunction, fevers, chills, weight loss, night pain, or night sweats.\par \par 6/17/20\par 54 yo M who presents for follow up with low back and bilateral hip pain.  Patient continues to make progress in PT/HEP.  Patient has been compliant with gabapentin 300 mg PO TID, cyclobenzaprine 10 mg PO BID PRN, and medrol dose pack.  Pain is now mild to moderate in intensity and is described as more of a pressure than a pain.  Patient denies new weakness, numbness or paresthesia.  Denies bowel/bladder dysfunction, fevers, chills, weight loss, night pain, or night sweats.\par \par 6/3/20\par 54 yo M who presents for follow up with low back and bilateral hip pain.  Patient reports that pain in his low back has markedly improved.  Pain is now most concentrated along the lateral aspects of bilateral hips.  Patient continues to take cyclobenzaprine with significant improvement in his pain.  Patient continues with physical therapy and HEP with significant improvement in his pain.  Patient denies new weakness, numbness or paresthesia.  Denies bowel/bladder dysfunction, fevers, chills, weight loss, night pain, or night sweats.\par \par 5/13/20\par 54 yo M who presents for follow up with low back pain.  Patient reports significant improvement with previous toradol IM injection, gabapentin and cyclobenzaprine.  Patient also has restarted PT and HEP with significant improvement in his pain.  Patient denies new trauma or falls.  Patient denies new weakness, numbness or paresthesia.  Denies bowel/bladder dysfunction, fevers, chills, weight loss, night pain, or night sweats.\par \par 4/23/20\par 54 yo M who presents for follow up with low back pain.  Patient reports that the pain is about the same.  Patient reports intermittent compliance with gabapentin and methocarbamol.  Patient had been taking tylenol but was having trouble finding this medication in stores due to the pandemic.  Patient reports that pain is now radiating into left lower extremity.  Patient denies new weakness, numbness or paresthesia.  Denies bowel/bladder dysfunction, fevers, chills, weight loss, night pain, or night sweats.\par \par 3/31/20\par 54 yo M who presents for follow up for low back pain.  Pain had been improving.  However, patient went back to work last week and the pain has become worse.  Patient reports compliance with meloxicam, gabapentin, and cyclobenzaprine.  Patient reports taking one dose of ibuprofen with some improvement.  Patient denies new weakness, numbness or paresthesia.  Denies bowel/bladder dysfunction, fevers, chills, weight loss, night pain, or night sweats.\par \par 3/18/20\par Mr. MARCUS RUELAS is a 55 year old male here for follow up of bilateral leg L low back pain.  Reports falling ~7-8 feet off ladder and onto his back.  Went to ER at the time and had Xray (unremarkable).  Started with PT since 11/2019 with partial improvement - however reports pain regression since last Friday.  Pain feels worsened despite oral medications (meloxicam, cyclobenzaprine).  Here for MRI review which demonstrates stenosis at L3/4 > L4/5.  Has intermittent exacerbation of pain and numbness/tingling sensation to leg.  Pain radiates down bilateral posterolateral aspect of legs slightly beyond knee.\par \par Location: low back + R>L LE\par Inciting Event: fall from ladder (7/2019)\par Onset/timing: was intermittent and now fairly constant\par Quality: sharp, pressure\par Severity: 6-7/10\par Exacerbating Factor: walking ~1/2 block, standing\par Relieving factor: sitting, forward flexion\par Radiation: initially only R LE, now bilateral (R>L) posterolateral leg\par Numbness/Tingling: bilateral anterior thigh and posterior upper calf\par Cough/Sneezing: increase pressure\par Bowel/Bladder incontinence: denies\par Extremity weakness: denies\par \par Prior Treatments\par Injections: denies\par Surgery:  denies\par PT/OT: ongoing since 11/2019\par Medications: Tylenol, advil PRN, meloxicam, cyclobenzaprine\par

## 2021-03-01 ENCOUNTER — NON-APPOINTMENT (OUTPATIENT)
Age: 56
End: 2021-03-01

## 2021-03-11 ENCOUNTER — APPOINTMENT (OUTPATIENT)
Dept: OPHTHALMOLOGY | Facility: CLINIC | Age: 56
End: 2021-03-11

## 2021-03-18 ENCOUNTER — RESULT REVIEW (OUTPATIENT)
Age: 56
End: 2021-03-18

## 2021-03-18 ENCOUNTER — OUTPATIENT (OUTPATIENT)
Dept: OUTPATIENT SERVICES | Facility: HOSPITAL | Age: 56
LOS: 1 days | End: 2021-03-18

## 2021-03-18 ENCOUNTER — APPOINTMENT (OUTPATIENT)
Dept: INTERNAL MEDICINE | Facility: CLINIC | Age: 56
End: 2021-03-18
Payer: COMMERCIAL

## 2021-03-18 VITALS
WEIGHT: 198 LBS | BODY MASS INDEX: 31.82 KG/M2 | HEIGHT: 66 IN | HEART RATE: 78 BPM | DIASTOLIC BLOOD PRESSURE: 78 MMHG | OXYGEN SATURATION: 98 % | SYSTOLIC BLOOD PRESSURE: 110 MMHG

## 2021-03-18 VITALS — TEMPERATURE: 208.76 F

## 2021-03-18 LAB
A1C WITH ESTIMATED AVERAGE GLUCOSE RESULT: 7 % — HIGH (ref 4–5.6)
ALBUMIN SERPL ELPH-MCNC: 4.7 G/DL — SIGNIFICANT CHANGE UP (ref 3.3–5)
ALP SERPL-CCNC: 77 U/L — SIGNIFICANT CHANGE UP (ref 40–120)
ALT FLD-CCNC: 35 U/L — SIGNIFICANT CHANGE UP (ref 4–41)
ANION GAP SERPL CALC-SCNC: 10 MMOL/L — SIGNIFICANT CHANGE UP (ref 7–14)
AST SERPL-CCNC: 36 U/L — SIGNIFICANT CHANGE UP (ref 4–40)
BASOPHILS # BLD AUTO: 0.11 K/UL — SIGNIFICANT CHANGE UP (ref 0–0.2)
BASOPHILS NFR BLD AUTO: 1.2 % — SIGNIFICANT CHANGE UP (ref 0–2)
BILIRUB SERPL-MCNC: 0.3 MG/DL — SIGNIFICANT CHANGE UP (ref 0.2–1.2)
BUN SERPL-MCNC: 17 MG/DL — SIGNIFICANT CHANGE UP (ref 7–23)
CALCIUM SERPL-MCNC: 9.7 MG/DL — SIGNIFICANT CHANGE UP (ref 8.4–10.5)
CHLORIDE SERPL-SCNC: 100 MMOL/L — SIGNIFICANT CHANGE UP (ref 98–107)
CHOLEST SERPL-MCNC: 123 MG/DL — SIGNIFICANT CHANGE UP
CO2 SERPL-SCNC: 28 MMOL/L — SIGNIFICANT CHANGE UP (ref 22–31)
CREAT SERPL-MCNC: 1.01 MG/DL — SIGNIFICANT CHANGE UP (ref 0.5–1.3)
EOSINOPHIL # BLD AUTO: 1.05 K/UL — HIGH (ref 0–0.5)
EOSINOPHIL NFR BLD AUTO: 11.7 % — HIGH (ref 0–6)
ESTIMATED AVERAGE GLUCOSE: 154 MG/DL — HIGH (ref 68–114)
GLUCOSE BLDC GLUCOMTR-MCNC: 130
GLUCOSE SERPL-MCNC: 130 MG/DL — HIGH (ref 70–99)
HCT VFR BLD CALC: 44.5 % — SIGNIFICANT CHANGE UP (ref 39–50)
HDLC SERPL-MCNC: 49 MG/DL — SIGNIFICANT CHANGE UP
HGB BLD-MCNC: 13.9 G/DL — SIGNIFICANT CHANGE UP (ref 13–17)
IANC: 4.77 K/UL — SIGNIFICANT CHANGE UP (ref 1.5–8.5)
IMM GRANULOCYTES NFR BLD AUTO: 0.4 % — SIGNIFICANT CHANGE UP (ref 0–1.5)
LIPID PNL WITH DIRECT LDL SERPL: 50 MG/DL — SIGNIFICANT CHANGE UP
LYMPHOCYTES # BLD AUTO: 2.22 K/UL — SIGNIFICANT CHANGE UP (ref 1–3.3)
LYMPHOCYTES # BLD AUTO: 24.8 % — SIGNIFICANT CHANGE UP (ref 13–44)
MCHC RBC-ENTMCNC: 27.9 PG — SIGNIFICANT CHANGE UP (ref 27–34)
MCHC RBC-ENTMCNC: 31.2 GM/DL — LOW (ref 32–36)
MCV RBC AUTO: 89.2 FL — SIGNIFICANT CHANGE UP (ref 80–100)
MONOCYTES # BLD AUTO: 0.77 K/UL — SIGNIFICANT CHANGE UP (ref 0–0.9)
MONOCYTES NFR BLD AUTO: 8.6 % — SIGNIFICANT CHANGE UP (ref 2–14)
NEUTROPHILS # BLD AUTO: 4.77 K/UL — SIGNIFICANT CHANGE UP (ref 1.8–7.4)
NEUTROPHILS NFR BLD AUTO: 53.3 % — SIGNIFICANT CHANGE UP (ref 43–77)
NON HDL CHOLESTEROL: 74 MG/DL — SIGNIFICANT CHANGE UP
NRBC # BLD: 0 /100 WBCS — SIGNIFICANT CHANGE UP
NRBC # FLD: 0 K/UL — SIGNIFICANT CHANGE UP
PLATELET # BLD AUTO: 251 K/UL — SIGNIFICANT CHANGE UP (ref 150–400)
POTASSIUM SERPL-MCNC: 4.3 MMOL/L — SIGNIFICANT CHANGE UP (ref 3.5–5.3)
POTASSIUM SERPL-SCNC: 4.3 MMOL/L — SIGNIFICANT CHANGE UP (ref 3.5–5.3)
PROT SERPL-MCNC: 7.8 G/DL — SIGNIFICANT CHANGE UP (ref 6–8.3)
RBC # BLD: 4.99 M/UL — SIGNIFICANT CHANGE UP (ref 4.2–5.8)
RBC # FLD: 13.4 % — SIGNIFICANT CHANGE UP (ref 10.3–14.5)
SODIUM SERPL-SCNC: 138 MMOL/L — SIGNIFICANT CHANGE UP (ref 135–145)
TRIGL SERPL-MCNC: 119 MG/DL — SIGNIFICANT CHANGE UP
WBC # BLD: 8.96 K/UL — SIGNIFICANT CHANGE UP (ref 3.8–10.5)
WBC # FLD AUTO: 8.96 K/UL — SIGNIFICANT CHANGE UP (ref 3.8–10.5)

## 2021-03-18 PROCEDURE — ZZZZZ: CPT

## 2021-03-18 RX ORDER — GABAPENTIN 300 MG/1
300 CAPSULE ORAL
Qty: 120 | Refills: 0 | Status: DISCONTINUED | COMMUNITY
Start: 2020-12-31 | End: 2021-03-18

## 2021-03-18 RX ORDER — METFORMIN HYDROCHLORIDE 500 MG/1
500 TABLET, COATED ORAL TWICE DAILY
Qty: 180 | Refills: 2 | Status: DISCONTINUED | COMMUNITY
Start: 2017-11-10 | End: 2021-03-18

## 2021-03-19 DIAGNOSIS — I10 ESSENTIAL (PRIMARY) HYPERTENSION: ICD-10-CM

## 2021-03-19 DIAGNOSIS — E66.9 OBESITY, UNSPECIFIED: ICD-10-CM

## 2021-03-19 DIAGNOSIS — M54.16 RADICULOPATHY, LUMBAR REGION: ICD-10-CM

## 2021-03-19 DIAGNOSIS — E11.9 TYPE 2 DIABETES MELLITUS WITHOUT COMPLICATIONS: ICD-10-CM

## 2021-03-19 DIAGNOSIS — E78.5 HYPERLIPIDEMIA, UNSPECIFIED: ICD-10-CM

## 2021-03-19 LAB
CREAT ?TM UR-MCNC: 194 MG/DL — SIGNIFICANT CHANGE UP
MICROALBUMIN UR-MCNC: 8 MG/DL — SIGNIFICANT CHANGE UP
MICROALBUMIN/CREAT UR-RTO: 41 MG/G — HIGH (ref 0–30)

## 2021-03-19 NOTE — REVIEW OF SYSTEMS
[Fever] : no fever [Chills] : no chills [Fatigue] : no fatigue [Vision Problems] : no vision problems [Chest Pain] : no chest pain [Palpitations] : no palpitations [Claudication] : no  leg claudication [Lower Ext Edema] : no lower extremity edema [Orthopena] : no orthopnea [Paroxysmal Nocturnal Dyspnea] : no paroxysmal nocturnal dyspnea [Shortness Of Breath] : no shortness of breath [Wheezing] : no wheezing [Cough] : no cough [Dyspnea on Exertion] : not dyspnea on exertion [Abdominal Pain] : no abdominal pain [Nausea] : no nausea [Constipation] : no constipation [Diarrhea] : no diarrhea [Vomiting] : no vomiting [Memory Loss] : no memory loss

## 2021-03-19 NOTE — HISTORY OF PRESENT ILLNESS
[FreeTextEntry1] : follow-up [de-identified] : 55 year old male with HTN, HLD, T2DM, sciatica (2/2 multilevel spondylosis) Obesity and seasonal allergies here for continuation of care for his chronic medical problems.\par \par #Back pain\par S/p injection by PM&R in January. Improved after injection but still bothering him and limiting movement/activities. Pain generally 1-2/10 in intensity, radiates to lower extremities bilaterally. Occasional left thigh numbness but no saddle anesthesia or urinary/bowel incontinence. Pt has not been back to work due to pain and covid. Still takes Van and tylenol, does not use flexeril or meloxicam anymore. \par \par #HTN: \par Well controlled: at home 120's/80's; today 110/78. Taking losartan\par \par #DM: \par Has been well controlled in past, A1c increased to 7.7% 12/2020.  in office today, typically 120-130s at home. Pt has been exercising on treadmill, trying to improve diet by eating less rice. Pt has no complaints, no s/s of hyper/hypoglycemia. Saw ophthalmology last week, but has not seen podiatry. Taking metformin, on ARB/statin.\par \par #Obesity:\par Weight stable at 198lb. Exercise and diet lifestyle modifications as described above.\par \par Patient denies any chest pain, SOB, weight loss, fever, chills, night sweats, nausea, vomiting, abdominal pain, changes in bowel habits, urinary symptoms, leg swelling, or weakness.\par \par

## 2021-03-19 NOTE — PHYSICAL EXAM
[No Acute Distress] : no acute distress [Well Nourished] : well nourished [Well Developed] : well developed [Well-Appearing] : well-appearing [Normal Sclera/Conjunctiva] : normal sclera/conjunctiva [PERRL] : pupils equal round and reactive to light [EOMI] : extraocular movements intact [Normal Outer Ear/Nose] : the outer ears and nose were normal in appearance [No JVD] : no jugular venous distention [No Respiratory Distress] : no respiratory distress  [No Accessory Muscle Use] : no accessory muscle use [Clear to Auscultation] : lungs were clear to auscultation bilaterally [Normal Rate] : normal rate  [Regular Rhythm] : with a regular rhythm [Normal S1, S2] : normal S1 and S2 [No Murmur] : no murmur heard [No Edema] : there was no peripheral edema [Soft] : abdomen soft [Non Tender] : non-tender [Non-distended] : non-distended [No Masses] : no abdominal mass palpated [No Joint Swelling] : no joint swelling [No Rash] : no rash [Coordination Grossly Intact] : coordination grossly intact [No Focal Deficits] : no focal deficits [Normal Gait] : normal gait [Deep Tendon Reflexes (DTR)] : deep tendon reflexes were 2+ and symmetric [Normal Affect] : the affect was normal [Normal Insight/Judgement] : insight and judgment were intact [Right Foot Was Examined] : Right foot ~C was examined [Left Foot Was Examined] : left foot ~C was examined [] : Both feet are normal [None] : no ulcers in either foot were found [[id number]] : +2

## 2021-03-19 NOTE — ASSESSMENT
[FreeTextEntry1] : 55 year old male with HTN, HLD, T2DM, sciatica (2/2 multilevel spondylosis) Obesity and seasonal allergies here for continuation of care for his chronic medical problems.\par \par #HTN: \par - well controlled\par - c/w losartan\par \par #DM2: \par - last A1C increased 7.0% -> 7.7% (12/2021)\par - counseled on healthy eating/lifestyle modifications\par - increase metformin dose to 1g BID\par - A1C today, Urine prot/cr ratio\par - recent ophtho visit\par - unremarkable diabetic foot exam today\par \par #HLD\par - c/w atorvastatin and coenzyme q\par - lipid panel with labs today\par \par #Obesity: \par - weight stable\par - counseled on healthy eating/lifestyle modifications\par - pt seeing PT for back pain, recommended doing PT exercises daily even when not at PT\par \par #Lumbar back pain\par - follows with PM&R\par - s/p steroid injections, last 1/2021\par - c/w FATIMAH 300 TID and Tylenol\par - Reordered lidocaine patches\par \par #HCM\par - Flu shot given 12/2020\par - Tdap, Pneumovax UTD\par - Cscope done 2017, next due 2027\par - HIV, HCV negative \par - routine labs today\par \par Meds renewed as needed\par \par RTC in 3 months\par \par Case discussed with Dr. Gonzales\par \par Joni Ovalles MD\par Internal Medicine, PGY-2\par [employer] [facility]\par \par

## 2021-04-14 ENCOUNTER — APPOINTMENT (OUTPATIENT)
Dept: PHYSICAL MEDICINE AND REHAB | Facility: CLINIC | Age: 56
End: 2021-04-14

## 2021-06-10 ENCOUNTER — APPOINTMENT (OUTPATIENT)
Dept: PHYSICAL MEDICINE AND REHAB | Facility: CLINIC | Age: 56
End: 2021-06-10
Payer: MEDICAID

## 2021-06-10 DIAGNOSIS — M53.3 SACROCOCCYGEAL DISORDERS, NOT ELSEWHERE CLASSIFIED: ICD-10-CM

## 2021-06-10 DIAGNOSIS — M25.561 PAIN IN RIGHT KNEE: ICD-10-CM

## 2021-06-10 DIAGNOSIS — M25.562 PAIN IN RIGHT KNEE: ICD-10-CM

## 2021-06-10 PROCEDURE — 99214 OFFICE O/P EST MOD 30 MIN: CPT

## 2021-06-12 PROBLEM — M53.3 SACROILIAC JOINT DYSFUNCTION: Status: ACTIVE | Noted: 2020-03-11

## 2021-06-12 PROBLEM — M25.561 BILATERAL KNEE PAIN: Status: ACTIVE | Noted: 2021-02-26

## 2021-06-12 NOTE — PHYSICAL EXAM
[FreeTextEntry1] : General: NAD, alert\par Psych: normal mood and affect\par HEENT: NC/AT, normal visual tracking\par Pulmonary: no respiratory distress, chest expansion appears symmetrical\par CV: extremities are warm and perfused\par Abd: non-distended\par Ext: no c/c/e\par skin: normal color, appearance, and temperature\par \par Lumbar/Hip Spine:\par Gait - non-antalgic, able to heel and toe walk and perform functional squat, Leans left with standing\par Inspection: normal muscle bulk without asymmetry\par Tenderness to palpation: mild TTP over bilateral lower lumbar paraspinal (R > L) improved from previous exams, no tenderness over GT bursa bilaterally, non-tender PSIS and SI joint\par ROM lumbar - full, right hip limited most prominently in abduction and extension\par MMT: 5/5 bilateral lower extremities (HF, KE, KF, DF, PF, EHL)\par Reflexes: symmetric bilateral achilles and patella \par Sensory: intact to light touch in all dermatomes of the bilateral lower extremities\par Provocative testing:\par Facet loading - negative\par SLR - negative\par ASHLEY positive bilaterally, Gaenslen positive bilaterally, tenderness over bilateral SI joints\par Scour - negative\par \par Left knee: ROM limited by pain, minimal edema, no appreciable skin changes, tenderness to medial and lateral joint lines, neg shantel, neg varus/valgus, neg lachman, neg ant/post drawer\par Right knee: ROM limited by pain, some edema, no appreciable skin changes, tenderness to medial joint lines, neg shantel, neg varus/valgus, neg lachman, neg ant/post drawer\par

## 2021-06-12 NOTE — ASSESSMENT
[FreeTextEntry1] : This is MARCUS RUELAS,  a 55 year-old male with LBP after fall.  Xray from ER with no acute findings.  Low back pain likely 2/2 lumbar radiculopathy, lumbar spinal stenosis, GT bursitis and bilateral ITB syndrome.  Patient reports near resolution of his pain (1-2/10) following series of 3 x lumbar STEFFEN.  Will hold off on further CSI for now.  Patient with new left thigh numbness consistent with meralgia paresthetica.  Patient also with acute on chronic bilateral knee pain consistent with PFPS vs. OA.\par \par Plan:\par -EMG/NCS reviewed\par -MRI lumbar spine w/o contrast reviewed\par -XR bilateral knee ordered\par -Will start weaning gabapentin to 300mg TID. Patient denies a history of chronic kidney disease.  No side effects from current dose.  Patient advised not to make changes on his own of his medication and informed of the possibility of withdrawal symptoms, including seizure, with abrupt cessation of medication.\par -Renewed mobic 15 mg PO qdaily x 14 days, recommend to take with food.  Denies CKD, CAD, or gastritis.  Recommend that if patient develops GI symptoms including abdominal pain, nausea, or vomiting to discontinue use of medication immediately.\par -Recommend omeprazole 20 mg PO BID while on PO NSAIDs\par - Continue PT/HEP. New prescription provided.\par -Recommend conservative treatment for meralgia paresthetica including wearing of loose fitting clothing and weight loss.  Referral provided to Dietician to discuss weight loss strategies previously.  Patient advised to comply with these recommendations in order to obtain more adequte pain relief.  Will consider injections should pain persists despite compliance with conservative care.\par -RTC following XR to review results\par \par Crow Ch MD\par Spine and Sports Medicine\par \par [employer]\par At [facility]\par \par

## 2021-06-12 NOTE — HISTORY OF PRESENT ILLNESS
[FreeTextEntry1] : 6/10/21\par 54 yo M who presents for follow up with low back pain with radiation into bilateral (L > R) lower extremities.  Patient continues to complain of low back pain but he reports that the pain is less following lumbar STEFFEN.  Patient on this visit complains of significant bilateral knee pain.  Patient reports that pain is worse with prolonged standing and ambulation and improves with rest.  Patient denies recent trauma or fall.  Pain has been present for several years but worse over the last few months.  No buckling, clicking or locking.   Patient denies new weakness, numbness or paresthesia.  Denies bowel/bladder dysfunction, fevers, chills, weight loss, night pain, or night sweats.\par \par 2/26/21\par 54 yo M who presents for follow up with low back pain with radiation into bilateral (L > R) lower extremities.  Patient reports on this visit more numbness and pain in the anterior and lateral thighs.  Patient had been prescribed meloxicam but his pharmacy was out of this medication.  Patient reports that pharmacy should have this medication next week.  Patient reports that he continues with PT/HEP.  \par \par Of note, patient also reports some mild to moderate bilateral knee pain.  Pain has been present for a couple of years.  No inciting event or trauma.  No buckliing, locking, or clicking. Patient denies new weakness, numbness or paresthesia.  Denies bowel/bladder dysfunction, fevers, chills, weight loss, night pain, or night sweats.\par \par \par 1/29/21\par 54 yo M who presents for follow up with low back pain with radiation into his right lower extremity.  Patient s/p series of 3 right L4-L5, L5-S1 TFESI with the latest occurring on 1/15/21 with almost complete resolution of his pain.  Pain rated as 1-2/10 in severity and intermittent.  Patient continues to have some pain that radiates into the lateral calf and ankle.  Patient had been experiencing some right knee pain prior to his injection, but pain has resolved fully following lumbar STEFFEN.  Patient reports monitoring his BS following these injections and no episodes of hyperglycemia or fluctuations in BS.\par \par Patient also reports left lateral and anterior thigh numbness.  Patient reports that this symptom has been present for the last few weeks and is worse with standing.  Patient reports no pain along with these symptoms.  No significant weight gain.  Denies bowel/bladder dysfunction, fevers, chills, weight loss, night pain, or night sweats.\par \par 1/6/21\par 54 yo who presents with low back pain with radiation into his right lower extremity who presents for EMG/NCS.\par \par 12/31/20\par 54 yo M who presents for follow up with low back pain. Patient s/p second right L4-L5, L5-S1 TFESI on 12/18/20 (first on 11/13/20) with significant improvement in his pain.  Patient reports greater than 50% pain relief of low back pain following this second injection.  However, patient continues to have burning sensation in right calf that is 5/10 at worst, 2/10 currently.  Patient takes gabapentin three times daily and tylenol as needed for the pain. Patient reports new right knee pain. He continues to go to physical therapy four times per week. Patient reports numbness in left thigh after walking 10 minutes, relieved with rest, present for several months. No new numbness or tingling in right lower extremity.  Patient denies new weakness or paresthesia.  Denies bowel/bladder dysfunction, fevers, chills, weight loss, night pain, or night sweats.\par \par 11/25/20\par 54 yo M who presents for follow up with low back pain. Patient s/p right L4-L5, L5-S1 TFESI on 11/13/20 with significant improvement in his pain.  Patient reports a 50% pain relief following this injection.  However, patient continues to complain of severe low back pain with radiation into right lower extremity when standing.  Patient takes tylenol for the pain.  Patient denies new weakness, numbness or paresthesia.  Denies bowel/bladder dysfunction, fevers, chills, weight loss, night pain, or night sweats.\par \par 11/11/20\par 54 yo M who presents for follow up with low back pain.  Patient reports that for the past week, he has been experiencing right calf pain.  No inciting event or trauma.  Patient reports more pain at night.  Patient denies new weakness, numbness or paresthesia.  Denies bowel/bladder dysfunction, fevers, chills, weight loss, night pain, or night sweats.\par \par 10/16/20\par 54 yo M who presents for follow up with low back pain.  Patient reports that low back pain is about the same as on previous visit.  Patient reports that he has been compliant with PT/HEP and pain has definitely improved since his initial appointment with me.  However, patient reports that pain relief has plateaued.  With prolonged standing and walking, patient reports that pain may be significant at times.  Patient denies new weakness, numbness or paresthesia.  Denies bowel/bladder dysfunction, fevers, chills, weight loss, night pain, or night sweats. \par \par 9/9/20\par 54 yo M who presents for follow up with low back and bilateral hip pain.  Patient reports low back pain is slowly improving with physical therapy and grades the pain as 2.5/10.  Pain has now spread to the right buttock over the last 2 weeks.  No inciting event or trauma.  No association with prolonged sitting or standing.  Worse with bending forward and lifting heavy items.  Patient continues taking cyclobenzaprine 10 mg PO qHS and gabapentin 300 mg PO TID with adequate improvement in his pain.  Patient denies new weakness, numbness or paresthesia.  Denies bowel/bladder dysfunction, fevers, chills, weight loss, night pain, or night sweats.\par \par 8/12/20\par 54 yo M who presents for follow up with low back and bilateral hip pain.  Patient reports pain is now 4-5/10 pain with some improvement since his last visit.  Patient reports compliance with PT/HEP, mobic, cyclobenzaprine, and gabapentin.  Previously unable to tolerate up titration of gabapentin due to drowsiness.  Patient denies new weakness, numbness or paresthesia.  Denies bowel/bladder dysfunction, fevers, chills, weight loss, night pain, or night sweats.\par \par 7/29/20\par 54 yo M who presents for follow up with low back and bilateral hip pain.  Patient reports that pain has been slowly improving over the course of the last 3 weeks.  Patient reports that he is currently taking gabapentin 300 mg PO TID, cyclobenzaprine 10 mg PO BID PRN with adequate pain relief.  Patient reports that he continues to participate in PT/HEP.  Patient denies new weakness, numbness or paresthesia.  Denies bowel/bladder dysfunction, fevers, chills, weight loss, night pain, or night sweats.\par \par 7/8/20\par 54 yo M who presents for follow up with low back and bilateral hip pain.  Patient reports that pain has been slowly improving since his last visit.  Patient continues to take gabapentin 300 mg PO TID, cyclobenzaprine 10 mg PO BID PRN, and participates in PT/HEP with some improvement.  Pain continues to be mild to moderate in intensity.  Patient denies new weakness, numbness or paresthesia.  Denies bowel/bladder dysfunction, fevers, chills, weight loss, night pain, or night sweats.\par \par 6/17/20\par 54 yo M who presents for follow up with low back and bilateral hip pain.  Patient continues to make progress in PT/HEP.  Patient has been compliant with gabapentin 300 mg PO TID, cyclobenzaprine 10 mg PO BID PRN, and medrol dose pack.  Pain is now mild to moderate in intensity and is described as more of a pressure than a pain.  Patient denies new weakness, numbness or paresthesia.  Denies bowel/bladder dysfunction, fevers, chills, weight loss, night pain, or night sweats.\par \par 6/3/20\par 54 yo M who presents for follow up with low back and bilateral hip pain.  Patient reports that pain in his low back has markedly improved.  Pain is now most concentrated along the lateral aspects of bilateral hips.  Patient continues to take cyclobenzaprine with significant improvement in his pain.  Patient continues with physical therapy and HEP with significant improvement in his pain.  Patient denies new weakness, numbness or paresthesia.  Denies bowel/bladder dysfunction, fevers, chills, weight loss, night pain, or night sweats.\par \par 5/13/20\par 54 yo M who presents for follow up with low back pain.  Patient reports significant improvement with previous toradol IM injection, gabapentin and cyclobenzaprine.  Patient also has restarted PT and HEP with significant improvement in his pain.  Patient denies new trauma or falls.  Patient denies new weakness, numbness or paresthesia.  Denies bowel/bladder dysfunction, fevers, chills, weight loss, night pain, or night sweats.\par \par 4/23/20\par 54 yo M who presents for follow up with low back pain.  Patient reports that the pain is about the same.  Patient reports intermittent compliance with gabapentin and methocarbamol.  Patient had been taking tylenol but was having trouble finding this medication in stores due to the pandemic.  Patient reports that pain is now radiating into left lower extremity.  Patient denies new weakness, numbness or paresthesia.  Denies bowel/bladder dysfunction, fevers, chills, weight loss, night pain, or night sweats.\par \par 3/31/20\par 54 yo M who presents for follow up for low back pain.  Pain had been improving.  However, patient went back to work last week and the pain has become worse.  Patient reports compliance with meloxicam, gabapentin, and cyclobenzaprine.  Patient reports taking one dose of ibuprofen with some improvement.  Patient denies new weakness, numbness or paresthesia.  Denies bowel/bladder dysfunction, fevers, chills, weight loss, night pain, or night sweats.\par \par 3/18/20\par Mr. MARCUS RUELAS is a 55 year old male here for follow up of bilateral leg L low back pain.  Reports falling ~7-8 feet off ladder and onto his back.  Went to ER at the time and had Xray (unremarkable).  Started with PT since 11/2019 with partial improvement - however reports pain regression since last Friday.  Pain feels worsened despite oral medications (meloxicam, cyclobenzaprine).  Here for MRI review which demonstrates stenosis at L3/4 > L4/5.  Has intermittent exacerbation of pain and numbness/tingling sensation to leg.  Pain radiates down bilateral posterolateral aspect of legs slightly beyond knee.\par \par Location: low back + R>L LE\par Inciting Event: fall from ladder (7/2019)\par Onset/timing: was intermittent and now fairly constant\par Quality: sharp, pressure\par Severity: 6-7/10\par Exacerbating Factor: walking ~1/2 block, standing\par Relieving factor: sitting, forward flexion\par Radiation: initially only R LE, now bilateral (R>L) posterolateral leg\par Numbness/Tingling: bilateral anterior thigh and posterior upper calf\par Cough/Sneezing: increase pressure\par Bowel/Bladder incontinence: denies\par Extremity weakness: denies\par \par Prior Treatments\par Injections: denies\par Surgery:  denies\par PT/OT: ongoing since 11/2019\par Medications: Tylenol, advil PRN, meloxicam, cyclobenzaprine\par

## 2021-07-12 ENCOUNTER — NON-APPOINTMENT (OUTPATIENT)
Age: 56
End: 2021-07-12

## 2021-07-16 ENCOUNTER — RESULT REVIEW (OUTPATIENT)
Age: 56
End: 2021-07-16

## 2021-07-16 ENCOUNTER — APPOINTMENT (OUTPATIENT)
Dept: INTERNAL MEDICINE | Facility: CLINIC | Age: 56
End: 2021-07-16
Payer: MEDICAID

## 2021-07-16 ENCOUNTER — OUTPATIENT (OUTPATIENT)
Dept: OUTPATIENT SERVICES | Facility: HOSPITAL | Age: 56
LOS: 1 days | End: 2021-07-16

## 2021-07-16 ENCOUNTER — RESULT CHARGE (OUTPATIENT)
Age: 56
End: 2021-07-16

## 2021-07-16 VITALS
DIASTOLIC BLOOD PRESSURE: 80 MMHG | OXYGEN SATURATION: 97 % | WEIGHT: 189 LBS | HEART RATE: 85 BPM | BODY MASS INDEX: 30.37 KG/M2 | HEIGHT: 66 IN | SYSTOLIC BLOOD PRESSURE: 119 MMHG

## 2021-07-16 DIAGNOSIS — E11.9 TYPE 2 DIABETES MELLITUS WITHOUT COMPLICATIONS: ICD-10-CM

## 2021-07-16 DIAGNOSIS — E78.5 HYPERLIPIDEMIA, UNSPECIFIED: ICD-10-CM

## 2021-07-16 DIAGNOSIS — M25.571 PAIN IN RIGHT ANKLE AND JOINTS OF RIGHT FOOT: ICD-10-CM

## 2021-07-16 DIAGNOSIS — I10 ESSENTIAL (PRIMARY) HYPERTENSION: ICD-10-CM

## 2021-07-16 LAB
GLUCOSE BLDC GLUCOMTR-MCNC: 148
TSH SERPL-MCNC: 1.32 UIU/ML — SIGNIFICANT CHANGE UP (ref 0.27–4.2)

## 2021-07-16 PROCEDURE — 99213 OFFICE O/P EST LOW 20 MIN: CPT | Mod: GE

## 2021-07-16 RX ORDER — MELOXICAM 15 MG/1
15 TABLET ORAL DAILY
Qty: 30 | Refills: 0 | Status: DISCONTINUED | COMMUNITY
Start: 2021-06-10 | End: 2021-07-16

## 2021-07-16 NOTE — ASSESSMENT
[FreeTextEntry1] : #HCM\par - Colonoscopy done 2017; due in 2027\par - HIV, HCV neg\par - COVID vaccine: 3/2021

## 2021-07-16 NOTE — HISTORY OF PRESENT ILLNESS
[FreeTextEntry1] : R. ankle and R. second toe pain; acute visit  [de-identified] : 57 Y/O M with HTN, DM, obesity presenting with R. ankle and R. second toe dull, achy continuos pain/discomfort started 6 days ago associated with swelling that has since resolved. Noted to be worse while walking or standing for long periods of time. No insect bites. No trauma to that area. No fever/chills. No wounds/rashes. 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 Ankle pain likely musculoskeletal injury or repetitive injury. \par \par Pt also complaining of R. second toe erythema medially since 6 days ago associated with swelling that has since resolved. Likely 2/2 to callus formation.

## 2021-08-04 ENCOUNTER — APPOINTMENT (OUTPATIENT)
Dept: INTERNAL MEDICINE | Facility: CLINIC | Age: 56
End: 2021-08-04
Payer: MEDICAID

## 2021-08-04 ENCOUNTER — RESULT CHARGE (OUTPATIENT)
Age: 56
End: 2021-08-04

## 2021-08-04 ENCOUNTER — OUTPATIENT (OUTPATIENT)
Dept: OUTPATIENT SERVICES | Facility: HOSPITAL | Age: 56
LOS: 1 days | End: 2021-08-04

## 2021-08-04 VITALS
OXYGEN SATURATION: 98 % | HEIGHT: 66 IN | SYSTOLIC BLOOD PRESSURE: 118 MMHG | BODY MASS INDEX: 30.53 KG/M2 | WEIGHT: 190 LBS | DIASTOLIC BLOOD PRESSURE: 76 MMHG | HEART RATE: 74 BPM

## 2021-08-04 VITALS — TEMPERATURE: 206.96 F

## 2021-08-04 DIAGNOSIS — Z00.00 ENCOUNTER FOR GENERAL ADULT MEDICAL EXAMINATION W/OUT ABNORMAL FINDINGS: ICD-10-CM

## 2021-08-04 LAB — GLUCOSE BLDC GLUCOMTR-MCNC: 143

## 2021-08-04 PROCEDURE — 99213 OFFICE O/P EST LOW 20 MIN: CPT | Mod: GE

## 2021-08-04 RX ORDER — MELOXICAM 15 MG/1
15 TABLET ORAL DAILY
Qty: 14 | Refills: 0 | Status: DISCONTINUED | COMMUNITY
Start: 2021-01-29 | End: 2021-08-04

## 2021-08-04 RX ORDER — OMEPRAZOLE 20 MG/1
20 CAPSULE, DELAYED RELEASE ORAL TWICE DAILY
Qty: 28 | Refills: 0 | Status: DISCONTINUED | COMMUNITY
Start: 2021-01-29 | End: 2021-08-04

## 2021-08-04 NOTE — HISTORY OF PRESENT ILLNESS
[FreeTextEntry1] : follow-up [de-identified] : 56 year old male with HTN, HLD, T2DM, sciatica (2/2 multilevel spondylosis), Obesity and seasonal allergies here for continuation of care for his chronic medical problems.\par \par #right ankle pain and swelling\par Likely MSK in etiology. Pain is resolved, still some mild swelling. He has been taking ibuprofen.\par \par #Back pain\par S/p injection by PM&R in January. Improved after injection but still bothering him and limiting movement/activities. Pain generally 1-2/10 in intensity, radiates to lower extremities bilaterally. Occasional left thigh numbness but no saddle anesthesia or urinary/bowel incontinence. Pt has been back to work part time and has been tolerating it. Still takes Van and tylenol, does not use flexeril or meloxicam anymore. PT has helped in the past but insurance stopped covering it.\par \par #HTN: \par Well controlled: at home 120's/80's; today 118/76. Taking losartan\par \par #DM: \par A1c 6/6% today.  in office today, typically 120-130s at home. Pt has been exercising on treadmill, trying to improve diet by eating less rice. Pt has no complaints, no s/s of hyper/hypoglycemia. Taking metformin, on ARB/statin. Has an ophtho appt coming up.\par \par #Obesity:\par Lost 8 lbs since March, BMI 30. Exercise and diet lifestyle modifications as described above.\par \par Patient denies any chest pain, SOB, weight loss, fever, chills, night sweats, nausea, vomiting, abdominal pain, changes in bowel habits, urinary symptoms, leg swelling, or weakness.

## 2021-08-04 NOTE — REVIEW OF SYSTEMS
[Negative] : Neurological [Fever] : no fever [Chills] : no chills [Fatigue] : no fatigue [Discharge] : no discharge [Pain] : no pain [Vision Problems] : no vision problems [Chest Pain] : no chest pain [Palpitations] : no palpitations [Claudication] : no  leg claudication [Lower Ext Edema] : no lower extremity edema [Orthopena] : no orthopnea [Paroxysmal Nocturnal Dyspnea] : no paroxysmal nocturnal dyspnea [Shortness Of Breath] : no shortness of breath [Wheezing] : no wheezing [Cough] : no cough [Abdominal Pain] : no abdominal pain [Nausea] : no nausea [Constipation] : no constipation [Vomiting] : no vomiting [Dysuria] : no dysuria [Headache] : no headache [Dizziness] : no dizziness [Confusion] : no confusion [Memory Loss] : no memory loss

## 2021-08-04 NOTE — ASSESSMENT
[FreeTextEntry1] : 56 year old male with HTN, HLD, T2DM, sciatica (2/2 multilevel spondylosis) Obesity and seasonal allergies here for continuation of care for his chronic medical problems.\par \par #HTN: \par - well controlled\par - c/w losartan\par \par #DM2: \par - A1c 6.6% today\par - counseled on healthy eating/lifestyle modifications\par - c/w metformin 1g BID\par - Urine prot/cr ratio 41 in 3/2021\par - ophtho visit coming up soon\par \par #HLD\par - c/w atorvastatin and coenzyme q\par - lipid panel reassuring 3/2021\par \par #Obesity: \par - 8 pound weight loss since prior visit\par - counseled on healthy eating/lifestyle modifications\par \par #Lumbar back pain\par - follows with PM&R\par - s/p steroid injections, last 1/2021\par - c/w FATIMAH 300 TID and Tylenol\par - c/w lidocaine patches\par - new PT referral given\par \par #HCM\par - Flu shot given 12/2020\par - Tdap, Pneumovax UTD\par - s/p COVID vaccine: 2nd dose pfizer 3/28/21\par - Cscope done 2017, next due 2027\par - HIV, HCV negative \par - routine labs 3/2021\par \par Meds renewed as needed\par \par RTC in 3 months\par \par Case discussed with Dr. Leal\par \par Joni Ovalles MD\par Internal Medicine, PGY-3\par [employer] [facility]\par

## 2021-08-06 DIAGNOSIS — E11.9 TYPE 2 DIABETES MELLITUS WITHOUT COMPLICATIONS: ICD-10-CM

## 2021-08-06 DIAGNOSIS — I10 ESSENTIAL (PRIMARY) HYPERTENSION: ICD-10-CM

## 2021-08-06 DIAGNOSIS — M25.571 PAIN IN RIGHT ANKLE AND JOINTS OF RIGHT FOOT: ICD-10-CM

## 2021-08-06 DIAGNOSIS — E66.9 OBESITY, UNSPECIFIED: ICD-10-CM

## 2021-08-06 DIAGNOSIS — M54.31 SCIATICA, RIGHT SIDE: ICD-10-CM

## 2021-09-13 ENCOUNTER — NON-APPOINTMENT (OUTPATIENT)
Age: 56
End: 2021-09-13

## 2021-09-13 ENCOUNTER — APPOINTMENT (OUTPATIENT)
Dept: OPHTHALMOLOGY | Facility: CLINIC | Age: 56
End: 2021-09-13
Payer: MEDICAID

## 2021-09-13 PROCEDURE — 92134 CPTRZ OPH DX IMG PST SGM RTA: CPT

## 2021-09-13 PROCEDURE — 92014 COMPRE OPH EXAM EST PT 1/>: CPT

## 2021-11-02 ENCOUNTER — NON-APPOINTMENT (OUTPATIENT)
Age: 56
End: 2021-11-02

## 2021-11-05 ENCOUNTER — MED ADMIN CHARGE (OUTPATIENT)
Age: 56
End: 2021-11-05

## 2021-11-05 ENCOUNTER — APPOINTMENT (OUTPATIENT)
Dept: INTERNAL MEDICINE | Facility: CLINIC | Age: 56
End: 2021-11-05

## 2021-11-05 ENCOUNTER — OUTPATIENT (OUTPATIENT)
Dept: OUTPATIENT SERVICES | Facility: HOSPITAL | Age: 56
LOS: 1 days | End: 2021-11-05

## 2021-11-05 VITALS — TEMPERATURE: 206.24 F

## 2021-11-05 DIAGNOSIS — Z23 ENCOUNTER FOR IMMUNIZATION: ICD-10-CM

## 2021-11-16 ENCOUNTER — RESULT CHARGE (OUTPATIENT)
Age: 56
End: 2021-11-16

## 2021-11-17 ENCOUNTER — RESULT REVIEW (OUTPATIENT)
Age: 56
End: 2021-11-17

## 2021-11-17 ENCOUNTER — NON-APPOINTMENT (OUTPATIENT)
Age: 56
End: 2021-11-17

## 2021-11-17 ENCOUNTER — APPOINTMENT (OUTPATIENT)
Dept: INTERNAL MEDICINE | Facility: CLINIC | Age: 56
End: 2021-11-17
Payer: MEDICAID

## 2021-11-17 ENCOUNTER — OUTPATIENT (OUTPATIENT)
Dept: OUTPATIENT SERVICES | Facility: HOSPITAL | Age: 56
LOS: 1 days | End: 2021-11-17

## 2021-11-17 VITALS — TEMPERATURE: 205.34 F

## 2021-11-17 VITALS
WEIGHT: 190.13 LBS | DIASTOLIC BLOOD PRESSURE: 70 MMHG | OXYGEN SATURATION: 98 % | HEIGHT: 66 IN | SYSTOLIC BLOOD PRESSURE: 130 MMHG | BODY MASS INDEX: 30.56 KG/M2 | HEART RATE: 79 BPM

## 2021-11-17 LAB
A1C WITH ESTIMATED AVERAGE GLUCOSE RESULT: 6.4 % — HIGH (ref 4–5.6)
ALBUMIN SERPL ELPH-MCNC: 4.5 G/DL — SIGNIFICANT CHANGE UP (ref 3.3–5)
ALP SERPL-CCNC: 62 U/L — SIGNIFICANT CHANGE UP (ref 40–120)
ALT FLD-CCNC: 29 U/L — SIGNIFICANT CHANGE UP (ref 4–41)
ANION GAP SERPL CALC-SCNC: 9 MMOL/L — SIGNIFICANT CHANGE UP (ref 7–14)
AST SERPL-CCNC: 33 U/L — SIGNIFICANT CHANGE UP (ref 4–40)
BASOPHILS # BLD AUTO: 0.08 K/UL — SIGNIFICANT CHANGE UP (ref 0–0.2)
BASOPHILS NFR BLD AUTO: 0.8 % — SIGNIFICANT CHANGE UP (ref 0–2)
BILIRUB SERPL-MCNC: 0.3 MG/DL — SIGNIFICANT CHANGE UP (ref 0.2–1.2)
BUN SERPL-MCNC: 13 MG/DL — SIGNIFICANT CHANGE UP (ref 7–23)
CALCIUM SERPL-MCNC: 9.8 MG/DL — SIGNIFICANT CHANGE UP (ref 8.4–10.5)
CHLORIDE SERPL-SCNC: 100 MMOL/L — SIGNIFICANT CHANGE UP (ref 98–107)
CHOLEST SERPL-MCNC: 117 MG/DL — SIGNIFICANT CHANGE UP
CO2 SERPL-SCNC: 28 MMOL/L — SIGNIFICANT CHANGE UP (ref 22–31)
CREAT SERPL-MCNC: 0.78 MG/DL — SIGNIFICANT CHANGE UP (ref 0.5–1.3)
EOSINOPHIL # BLD AUTO: 1.03 K/UL — HIGH (ref 0–0.5)
EOSINOPHIL NFR BLD AUTO: 10.9 % — HIGH (ref 0–6)
ESTIMATED AVERAGE GLUCOSE: 137 — SIGNIFICANT CHANGE UP
GLUCOSE SERPL-MCNC: 118 MG/DL — HIGH (ref 70–99)
HCT VFR BLD CALC: 43.8 % — SIGNIFICANT CHANGE UP (ref 39–50)
HDLC SERPL-MCNC: 47 MG/DL — SIGNIFICANT CHANGE UP
HGB BLD-MCNC: 14.2 G/DL — SIGNIFICANT CHANGE UP (ref 13–17)
IANC: 5.82 K/UL — SIGNIFICANT CHANGE UP (ref 1.5–8.5)
IMM GRANULOCYTES NFR BLD AUTO: 0.2 % — SIGNIFICANT CHANGE UP (ref 0–1.5)
LIPID PNL WITH DIRECT LDL SERPL: 58 MG/DL — SIGNIFICANT CHANGE UP
LYMPHOCYTES # BLD AUTO: 1.81 K/UL — SIGNIFICANT CHANGE UP (ref 1–3.3)
LYMPHOCYTES # BLD AUTO: 19.2 % — SIGNIFICANT CHANGE UP (ref 13–44)
MCHC RBC-ENTMCNC: 28.9 PG — SIGNIFICANT CHANGE UP (ref 27–34)
MCHC RBC-ENTMCNC: 32.4 GM/DL — SIGNIFICANT CHANGE UP (ref 32–36)
MCV RBC AUTO: 89.2 FL — SIGNIFICANT CHANGE UP (ref 80–100)
MONOCYTES # BLD AUTO: 0.67 K/UL — SIGNIFICANT CHANGE UP (ref 0–0.9)
MONOCYTES NFR BLD AUTO: 7.1 % — SIGNIFICANT CHANGE UP (ref 2–14)
NEUTROPHILS # BLD AUTO: 5.82 K/UL — SIGNIFICANT CHANGE UP (ref 1.8–7.4)
NEUTROPHILS NFR BLD AUTO: 61.8 % — SIGNIFICANT CHANGE UP (ref 43–77)
NON HDL CHOLESTEROL: 70 MG/DL — SIGNIFICANT CHANGE UP
NRBC # BLD: 0 /100 WBCS — SIGNIFICANT CHANGE UP
NRBC # FLD: 0 K/UL — SIGNIFICANT CHANGE UP
PLATELET # BLD AUTO: 265 K/UL — SIGNIFICANT CHANGE UP (ref 150–400)
POTASSIUM SERPL-MCNC: 4.3 MMOL/L — SIGNIFICANT CHANGE UP (ref 3.5–5.3)
POTASSIUM SERPL-SCNC: 4.3 MMOL/L — SIGNIFICANT CHANGE UP (ref 3.5–5.3)
PROT SERPL-MCNC: 7.6 G/DL — SIGNIFICANT CHANGE UP (ref 6–8.3)
RBC # BLD: 4.91 M/UL — SIGNIFICANT CHANGE UP (ref 4.2–5.8)
RBC # FLD: 13.2 % — SIGNIFICANT CHANGE UP (ref 10.3–14.5)
SODIUM SERPL-SCNC: 137 MMOL/L — SIGNIFICANT CHANGE UP (ref 135–145)
T4 FREE SERPL-MCNC: 1.1 NG/DL — SIGNIFICANT CHANGE UP (ref 0.9–1.8)
TRIGL SERPL-MCNC: 62 MG/DL — SIGNIFICANT CHANGE UP
TSH SERPL-MCNC: 1.6 UIU/ML — SIGNIFICANT CHANGE UP (ref 0.27–4.2)
WBC # BLD: 9.43 K/UL — SIGNIFICANT CHANGE UP (ref 3.8–10.5)
WBC # FLD AUTO: 9.43 K/UL — SIGNIFICANT CHANGE UP (ref 3.8–10.5)

## 2021-11-17 PROCEDURE — 99214 OFFICE O/P EST MOD 30 MIN: CPT | Mod: GC

## 2021-11-17 RX ORDER — GABAPENTIN 300 MG/1
300 CAPSULE ORAL
Qty: 90 | Refills: 5 | Status: DISCONTINUED | COMMUNITY
Start: 2020-03-18 | End: 2021-11-17

## 2021-11-17 RX ORDER — IBUPROFEN 400 MG/1
400 TABLET, FILM COATED ORAL
Qty: 28 | Refills: 0 | Status: DISCONTINUED | COMMUNITY
Start: 2021-07-16 | End: 2021-11-17

## 2021-11-17 RX ORDER — LIDOCAINE 4 %
4 ADHESIVE PATCH, MEDICATED TOPICAL
Qty: 10 | Refills: 4 | Status: DISCONTINUED | COMMUNITY
Start: 2020-06-09 | End: 2021-11-17

## 2021-11-18 ENCOUNTER — NON-APPOINTMENT (OUTPATIENT)
Age: 56
End: 2021-11-18

## 2021-11-18 NOTE — ASSESSMENT
[FreeTextEntry1] : 56 year old male with HTN, HLD, T2DM, sciatica (2/2 multilevel spondylosis) Obesity and seasonal allergies here for continuation of care for his chronic medical problems.\par \par #Fatigue\par - vague complaint of fatigue for past few months, otherwise negative ROS\par - EKG today at pt request -> NSR\par - labs normal 3/2021, will repeat today\par \par #HTN: \par - 130/70 today, although reasonable well-controlled at home\par - c/w losartan\par - pt instructed to call if BP consistently higher than  at home, can increase losartan to 50mg\par \par #DM2: \par - A1c 6.6% 8/2021, repeat w/ labs today\par - counseled on healthy eating/lifestyle modifications\par - c/w metformin 1g BID\par - Urine prot/cr ratio 41 in 3/2021\par - ophtho visit 2-3 months ago\par \par #HLD\par - c/w atorvastatin and coenzyme q\par - lipid panel reassuring 3/2021\par \par #Obesity: \par - 8 pound weight loss since prior visit\par - counseled on healthy eating/lifestyle modifications\par \par #Lumbar back pain\par - follows with PM&R, referral given today\par - s/p steroid injections, last 1/2021\par - c/w tylenol\par - new PT referral given\par \par #HCM\par - Flu shot given 2021\par - Tdap, Pneumovax UTD\par - s/p COVID vaccine: 2nd dose pfizer 3/28/21\par - Cscope done 2017, next due 2027\par - HIV, HCV negative \par - routine labs 3/2021, repeat today given complaint of fatigue\par \par Meds renewed as needed\par \par RTC in 6 months\par \par Case discussed with Dr. Gonzalez\par \par Joni Ovalles MD\par Internal Medicine, PGY-3\par [employer]\par

## 2021-11-18 NOTE — HISTORY OF PRESENT ILLNESS
[FreeTextEntry1] : Follow up [de-identified] : 56 year old male with HTN, HLD, T2DM, sciatica (2/2 multilevel spondylosis), Obesity and seasonal allergies here for continuation of care for his chronic medical problems. Patient overall feels well, complains of some mild fatigue for the past few months.\par \par #Back pain\par S/p injection by PM&R in January. Improved after injection but still bothering him and limiting movement/activities. Pain generally 1-2/10 in intensity, radiates to lower extremities bilaterally. Occasional left thigh numbness but no saddle anesthesia or urinary/bowel incontinence. Pt has been back to work part time and has been tolerating it. No longer taking gabapentin or lidocaine patches, is only using Tylenol. PT has helped in the past and a referral was given on previous visit, but he has not gone.\par \par #HTN: \par Well controlled: at home 120's/80's; today 130/70 but has not taken his anti-HTN meds yet this AM. Taking losartan\par \par #DM: \par A1c 6/6% in 8/2021. FSS typically 120-130s at home. Pt has not been exercising, although he has been trying to improve diet by eating less rice. Pt has no complaints, no s/s of hyper/hypoglycemia. Taking metformin, on ARB/statin. Saw ophtho 2-3 months ago and everything was reportedly normal.\par \par #Obesity:\par Lost 8 lbs since March, BMI 30, stable from prior visit. Exercise and diet lifestyle modifications as described above.\par \par Patient denies any chest pain, SOB, weight loss, fever, chills, night sweats, nausea, vomiting, abdominal pain, changes in bowel habits, urinary symptoms, leg swelling, or weakness. \par  \par

## 2021-11-24 DIAGNOSIS — R63.4 ABNORMAL WEIGHT LOSS: ICD-10-CM

## 2021-11-24 DIAGNOSIS — M54.31 SCIATICA, RIGHT SIDE: ICD-10-CM

## 2021-11-24 DIAGNOSIS — E11.9 TYPE 2 DIABETES MELLITUS WITHOUT COMPLICATIONS: ICD-10-CM

## 2021-11-24 DIAGNOSIS — E66.9 OBESITY, UNSPECIFIED: ICD-10-CM

## 2021-11-24 DIAGNOSIS — I10 ESSENTIAL (PRIMARY) HYPERTENSION: ICD-10-CM

## 2021-11-24 DIAGNOSIS — E78.5 HYPERLIPIDEMIA, UNSPECIFIED: ICD-10-CM

## 2022-04-14 ENCOUNTER — APPOINTMENT (OUTPATIENT)
Dept: OPHTHALMOLOGY | Facility: CLINIC | Age: 57
End: 2022-04-14
Payer: MEDICAID

## 2022-04-14 ENCOUNTER — NON-APPOINTMENT (OUTPATIENT)
Age: 57
End: 2022-04-14

## 2022-04-14 PROCEDURE — 92134 CPTRZ OPH DX IMG PST SGM RTA: CPT

## 2022-04-14 PROCEDURE — 92014 COMPRE OPH EXAM EST PT 1/>: CPT

## 2022-04-28 ENCOUNTER — NON-APPOINTMENT (OUTPATIENT)
Age: 57
End: 2022-04-28

## 2022-05-11 ENCOUNTER — OUTPATIENT (OUTPATIENT)
Dept: OUTPATIENT SERVICES | Facility: HOSPITAL | Age: 57
LOS: 1 days | End: 2022-05-11

## 2022-05-11 ENCOUNTER — APPOINTMENT (OUTPATIENT)
Dept: INTERNAL MEDICINE | Facility: CLINIC | Age: 57
End: 2022-05-11
Payer: MEDICAID

## 2022-05-11 VITALS
RESPIRATION RATE: 16 BRPM | SYSTOLIC BLOOD PRESSURE: 126 MMHG | HEART RATE: 85 BPM | TEMPERATURE: 96.7 F | HEIGHT: 66 IN | DIASTOLIC BLOOD PRESSURE: 62 MMHG | BODY MASS INDEX: 29.73 KG/M2 | WEIGHT: 185 LBS | OXYGEN SATURATION: 99 %

## 2022-05-11 DIAGNOSIS — Z87.898 PERSONAL HISTORY OF OTHER SPECIFIED CONDITIONS: ICD-10-CM

## 2022-05-11 DIAGNOSIS — M79.661 PAIN IN RIGHT LOWER LEG: ICD-10-CM

## 2022-05-11 DIAGNOSIS — M25.571 PAIN IN RIGHT ANKLE AND JOINTS OF RIGHT FOOT: ICD-10-CM

## 2022-05-11 DIAGNOSIS — Z01.818 ENCOUNTER FOR OTHER PREPROCEDURAL EXAMINATION: ICD-10-CM

## 2022-05-11 DIAGNOSIS — E66.9 OBESITY, UNSPECIFIED: ICD-10-CM

## 2022-05-11 DIAGNOSIS — M70.61 TROCHANTERIC BURSITIS, RIGHT HIP: ICD-10-CM

## 2022-05-11 DIAGNOSIS — M76.31 ILIOTIBIAL BAND SYNDROME, RIGHT LEG: ICD-10-CM

## 2022-05-11 PROCEDURE — 99214 OFFICE O/P EST MOD 30 MIN: CPT | Mod: GC

## 2022-05-13 DIAGNOSIS — E11.9 TYPE 2 DIABETES MELLITUS WITHOUT COMPLICATIONS: ICD-10-CM

## 2022-05-13 DIAGNOSIS — E66.9 OBESITY, UNSPECIFIED: ICD-10-CM

## 2022-05-13 DIAGNOSIS — E78.5 HYPERLIPIDEMIA, UNSPECIFIED: ICD-10-CM

## 2022-05-13 DIAGNOSIS — I10 ESSENTIAL (PRIMARY) HYPERTENSION: ICD-10-CM

## 2022-05-13 DIAGNOSIS — M54.16 RADICULOPATHY, LUMBAR REGION: ICD-10-CM

## 2022-05-13 NOTE — ASSESSMENT
[FreeTextEntry1] : 57 year old male with HTN, HLD, T2DM, sciatica (2/2 multilevel spondylosis) Obesity and seasonal allergies here for continuation of care for his chronic medical problems.\par \par #HTN: \par - 126/62 today, well controlled\par - c/w losartan\par - pt instructed to call if BP consistently higher than  at home, can increase losartan to 50mg\par \par #DM2: \par - A1c 6.4% 11/2021\par - counseled on healthy eating/lifestyle modifications\par - c/w metformin 1g BID\par - Urine prot/cr ratio 41 in 3/2021, will repeat next visit\par - ophtho visit a few months ago as per pt\par \par #HLD\par - c/w atorvastatin and coenzyme q\par - lipid panel reassuring 11/2021\par \par #Obesity: \par - 5 pound weight loss since prior visit\par - counseled on healthy eating/lifestyle modifications\par \par #Lumbar back pain\par - follows with PM&R, referral given again today at pt request\par - s/p steroid injections, last 1/2021\par - c/w tylenol\par - new PT referral given at pt request\par \par #HCM\par - Flu shot given 2021\par - Tdap, Pneumovax UTD\par - s/p COVID vaccine: 2nd dose pfizer 3/28/21\par - Cscope done 2017, next due 2027\par - HIV, HCV negative \par \par Meds renewed as needed\par \par RTC in 6 months\par \par Case discussed with Dr. Alexandra\par \par Joni Ovalles MD\par Internal Medicine, PGY-3\par [employer]\par

## 2022-05-13 NOTE — HISTORY OF PRESENT ILLNESS
[FreeTextEntry1] : Follow up [de-identified] : 57 year old male with HTN, HLD, T2DM, sciatica (2/2 multilevel spondylosis), obesity and seasonal allergies here for continuation of care for his chronic medical problems. Patient overall feels well, has some anxiety because he has multiple cousins who have been diagnosed with cancer, although he's unsure what types.\par \par #Back pain\par S/p injection by PM&R last year but has had issues with insurance coverage. Pain generally 1-2/10 in intensity, radiates to lower extremities bilaterally. Occasional left thigh numbness but no saddle anesthesia or urinary/bowel incontinence. Pt has been back to work part time and has been tolerating it. No longer taking gabapentin or lidocaine patches, is only using Tylenol. PT has helped in the past and a referral was given on previous visit, but he has not gone.\par \par #HTN: \par Well controlled: at home 120's/80's; today 126/62. Taking losartan\par \par #DM: \par A1c 6.4% in 11/2021. FSS typically 120-130s at home. Pt has not been exercising, although he has been trying to improve diet by eating less rice. Pt has no complaints, no s/s of hyper/hypoglycemia. Taking metformin, on ARB/statin. Saw ophtho 2-3 months ago and everything was reportedly normal.\par \par #Obesity:\par Lost 5 lbs since prior visit, BMI 29, stable from prior visit. Exercise and diet lifestyle modifications as described above.\par \par Patient denies any chest pain, SOB, weight loss, fever, chills, night sweats, nausea, vomiting, abdominal pain, changes in bowel habits, urinary symptoms, leg swelling, or weakness. \par  \par

## 2022-05-13 NOTE — PHYSICAL EXAM
[No Acute Distress] : no acute distress [Well Nourished] : well nourished [Well Developed] : well developed [Well-Appearing] : well-appearing [Normal Sclera/Conjunctiva] : normal sclera/conjunctiva [PERRL] : pupils equal round and reactive to light [EOMI] : extraocular movements intact [Normal Outer Ear/Nose] : the outer ears and nose were normal in appearance [No JVD] : no jugular venous distention [No Respiratory Distress] : no respiratory distress  [No Accessory Muscle Use] : no accessory muscle use [Clear to Auscultation] : lungs were clear to auscultation bilaterally [Normal Rate] : normal rate  [Regular Rhythm] : with a regular rhythm [Normal S1, S2] : normal S1 and S2 [No Murmur] : no murmur heard [No Edema] : there was no peripheral edema [Soft] : abdomen soft [Non Tender] : non-tender [Non-distended] : non-distended [No Masses] : no abdominal mass palpated [No Joint Swelling] : no joint swelling [No Rash] : no rash [Coordination Grossly Intact] : coordination grossly intact [No Focal Deficits] : no focal deficits [Normal Gait] : normal gait [Deep Tendon Reflexes (DTR)] : deep tendon reflexes were 2+ and symmetric [Normal Affect] : the affect was normal [Normal Insight/Judgement] : insight and judgment were intact [[id number]] : +2

## 2022-10-13 ENCOUNTER — NON-APPOINTMENT (OUTPATIENT)
Age: 57
End: 2022-10-13

## 2022-10-13 ENCOUNTER — APPOINTMENT (OUTPATIENT)
Dept: OPHTHALMOLOGY | Facility: CLINIC | Age: 57
End: 2022-10-13

## 2022-10-13 PROCEDURE — 92134 CPTRZ OPH DX IMG PST SGM RTA: CPT

## 2022-10-13 PROCEDURE — 92014 COMPRE OPH EXAM EST PT 1/>: CPT

## 2022-12-09 ENCOUNTER — APPOINTMENT (OUTPATIENT)
Dept: INTERNAL MEDICINE | Facility: CLINIC | Age: 57
End: 2022-12-09

## 2022-12-09 ENCOUNTER — OUTPATIENT (OUTPATIENT)
Dept: OUTPATIENT SERVICES | Facility: HOSPITAL | Age: 57
LOS: 1 days | End: 2022-12-09

## 2022-12-09 VITALS
RESPIRATION RATE: 16 BRPM | BODY MASS INDEX: 30.22 KG/M2 | SYSTOLIC BLOOD PRESSURE: 122 MMHG | HEART RATE: 83 BPM | WEIGHT: 188 LBS | DIASTOLIC BLOOD PRESSURE: 70 MMHG | OXYGEN SATURATION: 98 % | HEIGHT: 66 IN

## 2022-12-09 DIAGNOSIS — Z23 ENCOUNTER FOR IMMUNIZATION: ICD-10-CM

## 2022-12-09 DIAGNOSIS — R10.9 UNSPECIFIED ABDOMINAL PAIN: ICD-10-CM

## 2022-12-09 DIAGNOSIS — M54.30 SCIATICA, UNSPECIFIED SIDE: ICD-10-CM

## 2022-12-09 DIAGNOSIS — R05.9 COUGH, UNSPECIFIED: ICD-10-CM

## 2022-12-09 PROCEDURE — 99214 OFFICE O/P EST MOD 30 MIN: CPT | Mod: GC

## 2022-12-09 NOTE — PHYSICAL EXAM
[No Acute Distress] : no acute distress [Well Nourished] : well nourished [Well Developed] : well developed [Well-Appearing] : well-appearing [Normal Sclera/Conjunctiva] : normal sclera/conjunctiva [PERRL] : pupils equal round and reactive to light [EOMI] : extraocular movements intact [Normal Outer Ear/Nose] : the outer ears and nose were normal in appearance [Normal Oropharynx] : the oropharynx was normal [No JVD] : no jugular venous distention [No Lymphadenopathy] : no lymphadenopathy [Supple] : supple [Thyroid Normal, No Nodules] : the thyroid was normal and there were no nodules present [No Respiratory Distress] : no respiratory distress  [No Accessory Muscle Use] : no accessory muscle use [Clear to Auscultation] : lungs were clear to auscultation bilaterally [Normal Rate] : normal rate  [Regular Rhythm] : with a regular rhythm [Normal S1, S2] : normal S1 and S2 [No Murmur] : no murmur heard [No Carotid Bruits] : no carotid bruits [No Abdominal Bruit] : a ~M bruit was not heard ~T in the abdomen [No Varicosities] : no varicosities [Pedal Pulses Present] : the pedal pulses are present [No Edema] : there was no peripheral edema [No Palpable Aorta] : no palpable aorta [No Extremity Clubbing/Cyanosis] : no extremity clubbing/cyanosis [Soft] : abdomen soft [Non-distended] : non-distended [No Masses] : no abdominal mass palpated [No HSM] : no HSM [Normal Bowel Sounds] : normal bowel sounds [Normal Posterior Cervical Nodes] : no posterior cervical lymphadenopathy [Normal Anterior Cervical Nodes] : no anterior cervical lymphadenopathy [No CVA Tenderness] : no CVA  tenderness [No Spinal Tenderness] : no spinal tenderness [No Joint Swelling] : no joint swelling [Grossly Normal Strength/Tone] : grossly normal strength/tone [No Rash] : no rash [Coordination Grossly Intact] : coordination grossly intact [No Focal Deficits] : no focal deficits [Normal Gait] : normal gait [Deep Tendon Reflexes (DTR)] : deep tendon reflexes were 2+ and symmetric [Normal Affect] : the affect was normal [Normal Insight/Judgement] : insight and judgment were intact [de-identified] : Clear mucus visible in oropharynx [de-identified] : Mild abdominal discomfort with palpation of RUQ. Negative Gonzalez's sign.

## 2022-12-09 NOTE — ASSESSMENT
[FreeTextEntry1] : 57 year old male with HTN, HLD, T2DM, sciatica (2/2 multilevel spondylosis), obesity and seasonal allergies presenting for annual health visit for his chronic medical problems.\par \par #Cough \par Pt stated he has had intermittent dry cough for about a year, unknown trigger. Pt has not been using fluticasone or loratadine for a long time. Pt w/ mucus in oropharynx on PE.\par -start fluticasone bid\par -can use loratadine if no improvement in sx\par -pt can return in 2-3 mos if cough persists\par \par #Sciatica\par Pt stated he feels that his back pain has been acting up recently \par -referred to PT\par -referred to PM&R\par \par #Abdominal discomfort\par Pt stated he occasionally has abdominal discomfort in the center of his abdomen and sometimes in the right side. Negative roth's sign. Related to constipation vs. choledocholithiasis \par -monitor for now \par -encouraged pt to identify whether pain comes on after eating or is relieved after BM\par \par #HCM\par Vaccines:\par -COVID: x2 2nd dose pfizer 3/28/21\par -Flu: today\par -TDAP: 3/13/19\par -PCV/PPSV: 11/9/17, 4/2/12\par -Shingrix: pt will get at pharmacy\par \par Screening:\par -Colonoscopy: done 2017, due 2027\par -Lipids, A1c: 11/21 normal, repeat today\par -PSA: done today\par \par Jewellilimikal Gliagias, PGY-1\par Case Discussed with Dr. Gonzales\par [facility]\par \par RTC in 2-3 months if cough persists\par \par

## 2022-12-09 NOTE — HISTORY OF PRESENT ILLNESS
[FreeTextEntry1] : 57 year old male with HTN, HLD, T2DM, sciatica (2/2 multilevel spondylosis), obesity and seasonal allergies presenting for annual health visit for his chronic medical problems.\par  [de-identified] : 57 year old male with HTN, HLD, T2DM, sciatica (2/2 multilevel spondylosis), obesity and seasonal allergies presenting for annual health visit for his chronic medical problems.\par \par #Cough \par Pt stated he has had a dry cough for about a year which comes and goes. He cannot identify any trigger to the cough. Occasionally he will cough up clear or light yellow mucus. Pt stated he does have runny nose at times. Pt endorsed occasional acid reflux but does not think the cough is related. Pt also states he believes he snores a little at night. Pt denied unintentional weight loss, hemoptysis, dysphagia. Pt stated he does feel a little more tired than usual. He has not been using fluticasone nasal spray or loratadine. Pt is worried the cough may be related to cancer, because he had a relative who had throat cancer.\par \par #Sciatica\par Pt stated he feels that his back pain has been acting up recently and he feels numbness in his L>R leg when standing for too long. Pt stated PT has helped him in the past and he would like a referral for both PT and PM&R to see Dr. Ch. Pt stated he believes the last time he had an injection was over a year ago.\par \par #Abdominal discomfort\par Pt stated he occasionally has abdominal discomfort in the center of his abdomen and sometimes in the right side. He cannot identify when the pain comes on and he is not sure if it is relieved with BM. Pt stated he has a good BM every other day. Denied blood in stool or on toilet paper. Colonoscopy done in 2017. \par \par Pt stated he has not gotten flu shot yet and would like to get it today. Pt is concerned about his heart because CAD runs in the family and would like to get an EKG today.

## 2023-03-21 ENCOUNTER — OUTPATIENT (OUTPATIENT)
Dept: OUTPATIENT SERVICES | Facility: HOSPITAL | Age: 58
LOS: 1 days | End: 2023-03-21

## 2023-03-21 ENCOUNTER — APPOINTMENT (OUTPATIENT)
Dept: INTERNAL MEDICINE | Facility: CLINIC | Age: 58
End: 2023-03-21
Payer: MEDICAID

## 2023-03-21 VITALS
SYSTOLIC BLOOD PRESSURE: 130 MMHG | DIASTOLIC BLOOD PRESSURE: 70 MMHG | HEIGHT: 66 IN | OXYGEN SATURATION: 98 % | BODY MASS INDEX: 31.5 KG/M2 | WEIGHT: 196 LBS | HEART RATE: 75 BPM

## 2023-03-21 PROCEDURE — 99214 OFFICE O/P EST MOD 30 MIN: CPT | Mod: GC

## 2023-03-21 RX ORDER — LORATADINE 10 MG/1
10 TABLET ORAL DAILY
Qty: 90 | Refills: 2 | Status: ACTIVE | COMMUNITY
Start: 2018-05-04 | End: 1900-01-01

## 2023-03-22 ENCOUNTER — NON-APPOINTMENT (OUTPATIENT)
Age: 58
End: 2023-03-22

## 2023-03-22 DIAGNOSIS — Z23 ENCOUNTER FOR IMMUNIZATION: ICD-10-CM

## 2023-03-22 DIAGNOSIS — E11.9 TYPE 2 DIABETES MELLITUS WITHOUT COMPLICATIONS: ICD-10-CM

## 2023-03-22 NOTE — ASSESSMENT
[FreeTextEntry1] : 58 year old male with HTN, HLD, T2DM, sciatica (2/2 multilevel spondylosis), obesity and seasonal allergies presenting for annual health visit for his chronic cough and diabetes.\par \par #Cough \par Pt stated he continues to have a mostly dry cough for more than a year now which comes and goes. Worse in morning, less at night. Started fluticasone without noticed improvement, occasionally takes loratadine. Still with reflux. Pt w/ elevated eosinophil count. R/o eosinophilic bronchitis vs. asthma\par -c/w fluticasone and loratadine, especially as the seasons begin to change\par -start pantoprazole 20 for 6 weeks, stop if no improvement in sx\par -ordered flow volume loop w/bronchodilator\par -return in 3 months\par \par #Sciatica\par Pt stated he has been working out more recently and his back pain has been acting up. He would like to see PM&R Dr. Ch. \par -referral ordered\par \par #Diabetes Type 2\par Pt w/ elevated A1c at 6.5.\par -advised pt to continue with lifestyle changes\par -c/w metformin\par \par #Urination at night\par Pt stated he has been waking up at night to urinate\par -advised pt to wait 30-60 minutes to go to bed after drinking water\par \par #HCM\par Vaccines:\par -COVID: x2 2nd dose pfizer 3/28/21\par -Flu: 12/9/22\par -TDAP: 3/13/19\par -PCV/PPSV: 11/9/17, 4/2/12\par -Shingrix: pt will get at pharmacy\par \par Screening:\par -Colonoscopy: done 2017, due 2027\par -Lipids, A1c: 12/22 normal\par -PSA: normal range 12/22\par \par Nunu Gliagias, PGY-1\par Case Discussed with Dr. Gonzales\par [facility]\par \par RTC in 3 months to f/u cough and A1c

## 2023-03-22 NOTE — HISTORY OF PRESENT ILLNESS
[FreeTextEntry1] : 58 year old male with HTN, HLD, T2DM, sciatica (2/2 multilevel spondylosis), obesity and seasonal allergies presenting for annual health visit for his chronic cough and diabetes. [de-identified] : 58 year old male with HTN, HLD, T2DM, sciatica (2/2 multilevel spondylosis), obesity and seasonal allergies presenting for annual health visit for his chronic cough and diabetes.\par \par #Cough \par Pt stated he continues to have a mostly dry cough for more than a year now which comes and goes. He still cannot identify any trigger to the cough. Occasionally he will cough up clear or light yellow mucus. Pt stated he does have runny nose at times. Pt endorsed occasional acid reflux. Pt denied unintentional weight loss, hemoptysis, dysphagia. Pt stated he does feel a little more tired than usual. He has been using fluticasone nasal spray but does not know if it is helping. He has taken the loratadine occasionally. \par \par #Sciatica\par Pt stated he has been working out more recently and his back pain has been acting up. He would like to see PM&R Dr. Ch. \par \par #Urination at night\par Pt stated he has been waking up at night to urinate. He stated he must drink water before going to bed or he will get a leg cramp.\par \par Discussed with pt his lab results, including elevated A1c. Pt wondering if we can check vitamin levels at his next visit. He stated he is taking a multivitamin every other day.

## 2023-03-22 NOTE — PHYSICAL EXAM
[No Acute Distress] : no acute distress [Well Nourished] : well nourished [Well Developed] : well developed [Well-Appearing] : well-appearing [Normal Sclera/Conjunctiva] : normal sclera/conjunctiva [PERRL] : pupils equal round and reactive to light [EOMI] : extraocular movements intact [Normal Outer Ear/Nose] : the outer ears and nose were normal in appearance [Normal Oropharynx] : the oropharynx was normal [No JVD] : no jugular venous distention [No Lymphadenopathy] : no lymphadenopathy [Supple] : supple [Thyroid Normal, No Nodules] : the thyroid was normal and there were no nodules present [No Respiratory Distress] : no respiratory distress  [No Accessory Muscle Use] : no accessory muscle use [Clear to Auscultation] : lungs were clear to auscultation bilaterally [Normal Rate] : normal rate  [Regular Rhythm] : with a regular rhythm [Normal S1, S2] : normal S1 and S2 [No Murmur] : no murmur heard [No Carotid Bruits] : no carotid bruits [No Abdominal Bruit] : a ~M bruit was not heard ~T in the abdomen [No Varicosities] : no varicosities [Pedal Pulses Present] : the pedal pulses are present [No Edema] : there was no peripheral edema [No Palpable Aorta] : no palpable aorta [No Extremity Clubbing/Cyanosis] : no extremity clubbing/cyanosis [Soft] : abdomen soft [Non Tender] : non-tender [Non-distended] : non-distended [No Masses] : no abdominal mass palpated [No HSM] : no HSM [Normal Bowel Sounds] : normal bowel sounds [No CVA Tenderness] : no CVA  tenderness [No Spinal Tenderness] : no spinal tenderness [No Joint Swelling] : no joint swelling [Grossly Normal Strength/Tone] : grossly normal strength/tone [No Rash] : no rash [Coordination Grossly Intact] : coordination grossly intact [No Focal Deficits] : no focal deficits [Normal Gait] : normal gait [Deep Tendon Reflexes (DTR)] : deep tendon reflexes were 2+ and symmetric [Normal Affect] : the affect was normal [Normal Insight/Judgement] : insight and judgment were intact [de-identified] : Clear mucus visible in oropharynx

## 2023-03-27 ENCOUNTER — APPOINTMENT (OUTPATIENT)
Dept: RADIOLOGY | Facility: IMAGING CENTER | Age: 58
End: 2023-03-27
Payer: MEDICAID

## 2023-03-27 ENCOUNTER — OUTPATIENT (OUTPATIENT)
Dept: OUTPATIENT SERVICES | Facility: HOSPITAL | Age: 58
LOS: 1 days | End: 2023-03-27
Payer: MEDICAID

## 2023-03-27 DIAGNOSIS — R05.9 COUGH, UNSPECIFIED: ICD-10-CM

## 2023-03-27 PROCEDURE — 71046 X-RAY EXAM CHEST 2 VIEWS: CPT | Mod: 26

## 2023-03-27 PROCEDURE — 71046 X-RAY EXAM CHEST 2 VIEWS: CPT

## 2023-03-29 ENCOUNTER — NON-APPOINTMENT (OUTPATIENT)
Age: 58
End: 2023-03-29

## 2023-04-03 ENCOUNTER — APPOINTMENT (OUTPATIENT)
Dept: OPHTHALMOLOGY | Facility: CLINIC | Age: 58
End: 2023-04-03

## 2023-04-06 ENCOUNTER — APPOINTMENT (OUTPATIENT)
Dept: PHYSICAL MEDICINE AND REHAB | Facility: CLINIC | Age: 58
End: 2023-04-06
Payer: MEDICAID

## 2023-04-06 VITALS — OXYGEN SATURATION: 98 % | SYSTOLIC BLOOD PRESSURE: 120 MMHG | HEART RATE: 80 BPM | DIASTOLIC BLOOD PRESSURE: 73 MMHG

## 2023-04-06 PROCEDURE — 99214 OFFICE O/P EST MOD 30 MIN: CPT

## 2023-04-06 NOTE — DATA REVIEWED
[MRI] : MRI [FreeTextEntry1] : Lumbar MRI 3/18/2020: Moderate severe multilevel lumbar spondylosis with multilevel disc bulging and disc degeneration most severe at L3-L4 and L4-L5 resulting in severe spinal stenosis.

## 2023-04-06 NOTE — PHYSICAL EXAM
[FreeTextEntry1] : General exam \par \par Constitutional: The patient appears well-developed, well-nourished, and in no apparent distress. Patient is well-groomed. \par Skin: The skin is warm and dry, with normal turgor. No rashes or lesions are noted. \par Eyes: PERRL. \par ENMT: Ears: Hearing is grossly within normal limits. \par Neck: Supple\par Respiratory: Breathing unlabored. \par Neurologic: Alert and oriented x 3. \par Psychiatric: Patient is cooperative and appropriate. Mood and affect are normal. Patient's insight is good, and memory and judgment are intact.\par \par LUMBAR EXAM\par \par APPEARANCE:\par No visible scars\par No gross deformity or malalignment\par No erythema, swelling or ecchymosis\par Decreased lumbar lordosis\par No clubbing, cyanosis, swelling or erythema on the left lower extremity\par No clubbing, cyanosis, swelling or erythema on the right lower extremity\par \par TENDERNESS:\par +trigger points over bilateral lumbar paraspinal muscles\par Absent over midline spinous processes\par Absent over left lumbar facet joints\par Absent over right lumbar facet joints \par +over left lumbar paraspinal muscles: erector spinae and quadratus lumborum\par +over right lumbar paraspinal muscles: erector spinae and quadratus lumborum\par Absent over left sacroiliac joint\par Absent over right sacroiliac joint\par \par ROM:\par Pain and stiffness limited A/PROM of the lumbar spine\par +pain with flexion, extension of the lumbar spine\par +hamstring tightness on the left\par +hamstring tightness on the right\par \par SPECIAL TESTS:\par Left straight leg raising test normal\par Right straight leg raising test normal\par FABERE left normal\par FABERE right normal\par \par SENSORY TESTING:\par Decreased to light touch Left L3-L4, L4-L5\par Intact to light touch Right L1-S2\par \par MOTOR TESTING:\par Muscle tone of the left lower extremity is normal\par Muscle tone of the right lower extremity is normal\par \par Left hip flexion strength is 4/5\par Right hip flexion strength is 5/5\par \par Left quadriceps strength is 5/5\par Right quadriceps strength is 5/5\par \par Left hamstrings strength is 5/5\par Right hamstrings strength is 5/5\par \par Left ankle dorsiflexion strength is 5/5\par Right ankle dorsiflexion strength is 5/5\par \par Left ankle plantar flexion strength is 5/5\par Right ankle plantar flexion strength is 5/5\par \par REFLEXES:\par Patella (L4) left 0\par Patella (L4) right 0\par \par GAIT:\par Balance fair with ambulation\par

## 2023-04-06 NOTE — HISTORY OF PRESENT ILLNESS
[FreeTextEntry1] : MARCUS RUELAS is an 58 year old M here for initial evaluation of bilateral leg numbness (L>R) and pain. He was last seen 6/2021 by Dr. Ch.\par \par Pain location: both legs\par Quality: aching\par Radiation: none\par Severity: 3/10\par Onset: years ago\par Associated symptoms: numbness along lateral aspect of both thighs\par Numbness: yes\par Weakness: none\par Exacerbated by: walking long distances\par Improved by: rest\par Bowel or bladder involvement: none\par \par Denies bowel/bladder dysfunction, saddle anesthesia, fevers, chills, weight loss, night pain, or night sweats at this time.\par \par Patient had tried Acetaminophen, NSAIDs, muscle relaxers, gabapentin. He had a series of 3 L4-L5, L5-S1 TFESI with last one on 1/15/21 and has had good pain relief since then.\par \par Patient had imaging studies to evaluate the pain. Lumbar MRI 3/18/21 was reviewed during this visit.\par Patient had not had any nerve conduction studies to evaluate the symptoms. \par Patient has not had physical therapy within the past 2 years.\par No history of back surgery.

## 2023-04-06 NOTE — ASSESSMENT
[FreeTextEntry1] : Mr. MARCUS RUELAS is a 58 year-old M with numbness and pain in both legs left > right, numbness worse than pain at this time. He reports chronic long standing pain. There are no myelopathic signs on today's exam.\par \par Patient reassured and educated on the diagnosis and treatment options. Risks and benefits of treatment and of delaying treatment discussed with patient. Risks discussed include but not limited to: progression of symptoms, worsening pain and functional status, etc.\par \par Lumbar MRI imaging from March 2020 reviewed with patient in office today. Imaging and report demonstrate: moderate to severe multilevel lumbar spondylosis with multilevel disc bulging and disc degeneration most severe at L3-L4 and L4-L5 resulting in severe spinal stenosis.\par \par Patient agreed to try a six to 8 week course of physical therapy to improve range of motion and function. We also discussed returning for lumbar paraspinal injections if needed. If he develops worsening radicular pain in his legs we can consider repeating TFESI. Although he does not have significant weakness or decrease in function at this time, patient would benefit from surgical evaluation. \par \par Referral to neurosurgery provided for definitive treatment\par Return for follow up in 6 weeks.\par \par I have personally spent a total of at least 35 minutes preparing, reviewing internal and external records, explaining, counseling, and coordinating care for this patient encounter.\par \par \par Patient was advised if the following symptoms develop: chills, fever, loss of bladder control, bowel incontinence or urinary retention, numbness/tingling or weakness is present in upper or lower extremities, to go to the nearest emergency room. This may be a new clinical condition not present at the time of the patient visit that may lead to paralysis and/or death. Patient advised if the above symptoms developed to also call the office immediately to inform us and to go to the nearest emergency room.\par \par This note was generated using Dragon medical dictation software. A reasonable effort had been made for proofreading its contents, but spelling mistakes or grammatical errors may still remain. If there are any questions or points of clarification needed please notify my office.

## 2023-04-06 NOTE — REVIEW OF SYSTEMS
[Patient Intake Form Reviewed] : Patient intake form was reviewed [Joint Stiffness] : joint stiffness [Negative] : Constitutional [Chest Pain] : no chest pain [Palpitations] : no palpitations [Incontinence] : no incontinence [FreeTextEntry5] : HTN, HLD [FreeTextEntry7] : no bowel incontinence [FreeTextEntry9] : back pain [de-identified] : lateral leg numbness (L>R), leg pain

## 2023-04-20 ENCOUNTER — APPOINTMENT (OUTPATIENT)
Dept: PULMONOLOGY | Facility: CLINIC | Age: 58
End: 2023-04-20
Payer: MEDICAID

## 2023-04-20 VITALS
BODY MASS INDEX: 28.88 KG/M2 | HEART RATE: 85 BPM | WEIGHT: 184 LBS | OXYGEN SATURATION: 98 % | HEIGHT: 67 IN | DIASTOLIC BLOOD PRESSURE: 67 MMHG | SYSTOLIC BLOOD PRESSURE: 121 MMHG

## 2023-04-20 PROCEDURE — 94060 EVALUATION OF WHEEZING: CPT

## 2023-04-20 PROCEDURE — 94726 PLETHYSMOGRAPHY LUNG VOLUMES: CPT

## 2023-04-20 PROCEDURE — 94729 DIFFUSING CAPACITY: CPT

## 2023-04-24 ENCOUNTER — APPOINTMENT (OUTPATIENT)
Dept: INTERNAL MEDICINE | Facility: CLINIC | Age: 58
End: 2023-04-24

## 2023-04-24 ENCOUNTER — OUTPATIENT (OUTPATIENT)
Dept: OUTPATIENT SERVICES | Facility: HOSPITAL | Age: 58
LOS: 1 days | End: 2023-04-24

## 2023-04-25 DIAGNOSIS — Z23 ENCOUNTER FOR IMMUNIZATION: ICD-10-CM

## 2023-04-28 ENCOUNTER — APPOINTMENT (OUTPATIENT)
Dept: INTERNAL MEDICINE | Facility: CLINIC | Age: 58
End: 2023-04-28
Payer: MEDICAID

## 2023-04-28 ENCOUNTER — OUTPATIENT (OUTPATIENT)
Dept: OUTPATIENT SERVICES | Facility: HOSPITAL | Age: 58
LOS: 1 days | End: 2023-04-28

## 2023-04-28 VITALS
WEIGHT: 184 LBS | HEART RATE: 81 BPM | SYSTOLIC BLOOD PRESSURE: 128 MMHG | OXYGEN SATURATION: 98 % | BODY MASS INDEX: 28.88 KG/M2 | HEIGHT: 67 IN | DIASTOLIC BLOOD PRESSURE: 74 MMHG

## 2023-04-28 PROCEDURE — 99214 OFFICE O/P EST MOD 30 MIN: CPT | Mod: GC

## 2023-04-28 NOTE — PHYSICAL EXAM
[No Acute Distress] : no acute distress [Well Nourished] : well nourished [Well Developed] : well developed [Well-Appearing] : well-appearing [Normal Sclera/Conjunctiva] : normal sclera/conjunctiva [EOMI] : extraocular movements intact [PERRL] : pupils equal round and reactive to light [Normal Outer Ear/Nose] : the outer ears and nose were normal in appearance [Normal Oropharynx] : the oropharynx was normal [No JVD] : no jugular venous distention [No Lymphadenopathy] : no lymphadenopathy [Supple] : supple [Thyroid Normal, No Nodules] : the thyroid was normal and there were no nodules present [No Respiratory Distress] : no respiratory distress  [No Accessory Muscle Use] : no accessory muscle use [Clear to Auscultation] : lungs were clear to auscultation bilaterally [Normal Rate] : normal rate  [Regular Rhythm] : with a regular rhythm [Normal S1, S2] : normal S1 and S2 [No Murmur] : no murmur heard [No Carotid Bruits] : no carotid bruits [No Abdominal Bruit] : a ~M bruit was not heard ~T in the abdomen [No Varicosities] : no varicosities [Pedal Pulses Present] : the pedal pulses are present [No Edema] : there was no peripheral edema [No Palpable Aorta] : no palpable aorta [No Extremity Clubbing/Cyanosis] : no extremity clubbing/cyanosis [Soft] : abdomen soft [Non Tender] : non-tender [Non-distended] : non-distended [No Masses] : no abdominal mass palpated [No HSM] : no HSM [Normal Bowel Sounds] : normal bowel sounds [No CVA Tenderness] : no CVA  tenderness [No Spinal Tenderness] : no spinal tenderness [No Joint Swelling] : no joint swelling [Grossly Normal Strength/Tone] : grossly normal strength/tone [No Rash] : no rash [Coordination Grossly Intact] : coordination grossly intact [No Focal Deficits] : no focal deficits [Normal Affect] : the affect was normal [Normal Insight/Judgement] : insight and judgment were intact

## 2023-04-29 NOTE — PLAN
[FreeTextEntry1] : 58 year old M with HTN, HLD, T2DM, sciatica (2/2 multilevel spondylosis), obesity and seasonal allergies presenting for follow up visit for his chronic cough and diabetes. \par \par #Chronic cough \par CXR negative, flow volume loop w/ bronchodilator negative for reversal. Restrictive ventilatory defect, normal TLC, borderline decreased DLCO. Still with reflux. Pt w/ elevated eosinophil count. DDx: upper airway cough syndrome vs. GERD vs. eosinophilic bronchitis vs. asthma (less likely)\par -increase fluticasone use to bid daily and loratadine daily (can take at bedtime)\par -start and increase pantoprazole to 40 for 6 weeks, stop if no improvement in sx\par -rxed CT scan to assess for any fibrosis \par -return in 3 months, will preform quantiferon at this time as well \par \par #Sciatica\par Pt seeing PM&R Dr. Ch, undergoing physical therapy.\par -ctm\par \par #Diabetes Type 2\par Pt w/ elevated A1c at 6.5.\par -c/w with lifestyle changes\par -c/w metformin\par -will recheck A1c in 3 mos\par \par #HCM\par Vaccines:\par -COVID: x2 2nd dose pfizer 3/28/21\par -Flu: 12/9/22\par -TDAP: 3/13/19\par -PCV/PPSV: 11/9/17, 4/2/12\par -Shingrix: pt will get at pharmacy\par \par Screening:\par -Colonoscopy: done 2017, due 2027\par -Lipids, A1c: 12/22 normal\par -PSA: normal range 12/22\par \par Vasiliki Gliagias, PGY-1\par Case Discussed with Dr. Gonzales\par [facility]\par \par RTC in 3 months to f/u cough and A1c.

## 2023-04-29 NOTE — HISTORY OF PRESENT ILLNESS
[FreeTextEntry1] : 58 year old male with HTN, HLD, T2DM, sciatica (2/2 multilevel spondylosis), obesity and seasonal allergies presenting for follow up visit for his chronic cough and diabetes. \par  [de-identified] : 58 year old male with HTN, HLD, T2DM, sciatica (2/2 multilevel spondylosis), obesity and seasonal allergies presenting for follow up visit for his chronic cough.\par \par #Cough: Pt stated he continues to have a cough for more than a year now which comes and goes. Pt stated it is present in the morning, sometimes during the day and at night. He does cough up mucus. Pt stated he does have runny nose. Pt stated he did not start taking the PPI yet because he was fasting for Ramadan. Pt denied fever, night sweats, dysuria, diarrhea, unintentional weight loss, hemoptysis, dysphagia. Pt has been using fluticasone nasal spray only as needed and takes the loratadine occasionally. \par \par #Sciatica: Pt seeing Dr. Ch and undergoing PT, with stabilization of sx.\par \par Pt went for CXR and flow volume loop w/bronchodilator.

## 2023-05-01 DIAGNOSIS — E11.9 TYPE 2 DIABETES MELLITUS WITHOUT COMPLICATIONS: ICD-10-CM

## 2023-05-01 DIAGNOSIS — M54.31 SCIATICA, RIGHT SIDE: ICD-10-CM

## 2023-05-01 DIAGNOSIS — R05.9 COUGH, UNSPECIFIED: ICD-10-CM

## 2023-05-04 ENCOUNTER — APPOINTMENT (OUTPATIENT)
Dept: OPHTHALMOLOGY | Facility: CLINIC | Age: 58
End: 2023-05-04
Payer: MEDICAID

## 2023-05-04 ENCOUNTER — NON-APPOINTMENT (OUTPATIENT)
Age: 58
End: 2023-05-04

## 2023-05-04 LAB
APPEARANCE: CLEAR
BACTERIA: NEGATIVE /HPF
BASOPHILS # BLD AUTO: 0.1 K/UL
BASOPHILS NFR BLD AUTO: 1.1 %
BILIRUBIN URINE: NEGATIVE
BLOOD URINE: NEGATIVE
CAST: 0 /LPF
COLOR: YELLOW
DEPRECATED KAPPA LC FREE/LAMBDA SER: 1.51 RATIO
EOSINOPHIL # BLD AUTO: 1.21 K/UL
EOSINOPHIL NFR BLD AUTO: 13.2 %
EPITHELIAL CELLS: 0 /HPF
GLUCOSE QUALITATIVE U: NEGATIVE MG/DL
HCT VFR BLD CALC: 44.8 %
HGB BLD-MCNC: 14.4 G/DL
IGA SER QL IEP: 316 MG/DL
IGG SER QL IEP: 1432 MG/DL
IGM SER QL IEP: 70 MG/DL
IMM GRANULOCYTES NFR BLD AUTO: 0.2 %
KAPPA LC CSF-MCNC: 2.25 MG/DL
KAPPA LC SERPL-MCNC: 3.4 MG/DL
KETONES URINE: NEGATIVE MG/DL
LEUKOCYTE ESTERASE URINE: NEGATIVE
LYMPHOCYTES # BLD AUTO: 2.02 K/UL
LYMPHOCYTES NFR BLD AUTO: 22 %
MAN DIFF?: NORMAL
MCHC RBC-ENTMCNC: 29 PG
MCHC RBC-ENTMCNC: 32.1 GM/DL
MCV RBC AUTO: 90.3 FL
MICROSCOPIC-UA: NORMAL
MONOCYTES # BLD AUTO: 0.67 K/UL
MONOCYTES NFR BLD AUTO: 7.3 %
NEUTROPHILS # BLD AUTO: 5.17 K/UL
NEUTROPHILS NFR BLD AUTO: 56.2 %
NITRITE URINE: NEGATIVE
PH URINE: 7
PLATELET # BLD AUTO: 253 K/UL
PROTEIN URINE: NORMAL MG/DL
RBC # BLD: 4.96 M/UL
RBC # FLD: 13.4 %
RED BLOOD CELLS URINE: 3 /HPF
SPECIFIC GRAVITY URINE: 1.02
STRONGYLOIDES AB SER IA-ACNC: NEGATIVE
TROPONIN-T, HIGH SENSITIVITY: 7 NG/L
TRYPTASE: 10.7 UG/L
UROBILINOGEN URINE: 0.2 MG/DL
VIT B12 SERPL-MCNC: 709 PG/ML
WBC # FLD AUTO: 9.19 K/UL
WHITE BLOOD CELLS URINE: 0 /HPF

## 2023-05-04 PROCEDURE — 92014 COMPRE OPH EXAM EST PT 1/>: CPT

## 2023-05-04 PROCEDURE — 76514 ECHO EXAM OF EYE THICKNESS: CPT

## 2023-05-05 LAB — DEPRECATED O AND P PREP STL: ABNORMAL

## 2023-05-08 ENCOUNTER — APPOINTMENT (OUTPATIENT)
Dept: SPINE | Facility: CLINIC | Age: 58
End: 2023-05-08
Payer: MEDICAID

## 2023-05-08 ENCOUNTER — NON-APPOINTMENT (OUTPATIENT)
Age: 58
End: 2023-05-08

## 2023-05-08 VITALS
BODY MASS INDEX: 28.88 KG/M2 | DIASTOLIC BLOOD PRESSURE: 69 MMHG | SYSTOLIC BLOOD PRESSURE: 108 MMHG | OXYGEN SATURATION: 98 % | HEIGHT: 67 IN | HEART RATE: 68 BPM | WEIGHT: 184 LBS

## 2023-05-08 DIAGNOSIS — M48.061 SPINAL STENOSIS, LUMBAR REGION WITHOUT NEUROGENIC CLAUDICATION: ICD-10-CM

## 2023-05-08 PROCEDURE — 99204 OFFICE O/P NEW MOD 45 MIN: CPT

## 2023-05-09 ENCOUNTER — RESULT REVIEW (OUTPATIENT)
Age: 58
End: 2023-05-09

## 2023-05-09 ENCOUNTER — APPOINTMENT (OUTPATIENT)
Dept: CT IMAGING | Facility: IMAGING CENTER | Age: 58
End: 2023-05-09

## 2023-05-09 NOTE — ADDENDUM
[FreeTextEntry1] : I have personally seen , performed an exam and reviewed his imaging with him on PACS and spine model. 57 yo M with bilateral anterior thigh numbness and pain on standing up. He does have multi level degenerative changes, but his numbness correlates with the severe L3-4 stenosis. \par No evidence of listhesis on his dated MRI either. \par Discussed the potential for a bilateral L3-4 decompression should his symptoms worsen. \par Would need updated MRI at that point, and a Flex/Ex L Spine as well. In the meantime, he will continue non operative interventions in the form of PT and PM, and see us as needed.\par We will review his Flex/Ex once done.\par \par I have spent 45 mins in this encounter.\par \par Dylan Rojas MD

## 2023-05-09 NOTE — HISTORY OF PRESENT ILLNESS
[> 3 months] : more  than 3 months [FreeTextEntry1] : back pain  [de-identified] : Patient is a 58 year old male with history of lower back pain and bilateral lower extremity numbness since 2020. Patient had seen Dr. Ch in the past and had series of 3 L4-5, L5-S1 TFESI with the last one on 1/15/21. He was recently revaluated by Dr. Martinez who recommended for PT and referred him for neurosurgical evaluation. He reports intermittent back pain across his lower back, numbness in bilateral anterior and lateral aspect of thighs left more than the right side. His numbness happens only when he stands for longer period of times. Currently his pain is 2-3/10 in severity and no numbness. No problem with walking. He denies weakness, saddle anesthesia, bladder or bowel dysfunction. Last MRI lumbar spine in March, 2020 reviewed and discussed with patient. His MRI with moderate to severe multilevel spondylosis with multilevel disc bulges most prominent at L3-4 with severe spinal stenosis, L4-5 lateral recess stenosis and R L5-S1 foraminal stenosis. Patient has been doing physical therapy and reports improvement

## 2023-05-09 NOTE — ASSESSMENT
[FreeTextEntry1] : 57 y/o M history of lower back pain and bilateral lower extremity numbness since 2020. Back pain is intermittent and lower extremity numbness in the bilateral aspect of anterior and lateral thighs. The numbness happens with prolonged period of standing. No problem with walking, no weakness, saddle anesthesia, bladder or bowel issues. He has been doing PT with improvement. Last MRI lumbar spine in March, 2020 reviewed and discussed with patient. His MRI with moderate to severe multilevel spondylosis with multilevel disc bulges most prominent at L3-4 with severe spinal stenosis, L4-5 lateral recess stenosis and R L5-S1 foraminal stenosis. \par Overall he is doing great clinically. Will have him continue PT at this time and watch for any worsening numbness. \par We can consider repeating his MRI if his symptoms worsen and discuss potential decompression at L3-4 level. \par Can obtain x-ray flexion extension at the meantime. \par

## 2023-05-11 ENCOUNTER — APPOINTMENT (OUTPATIENT)
Dept: NEUROSURGERY | Facility: CLINIC | Age: 58
End: 2023-05-11

## 2023-05-15 ENCOUNTER — OUTPATIENT (OUTPATIENT)
Dept: OUTPATIENT SERVICES | Facility: HOSPITAL | Age: 58
LOS: 1 days | End: 2023-05-15
Payer: MEDICAID

## 2023-05-15 ENCOUNTER — APPOINTMENT (OUTPATIENT)
Dept: RADIOLOGY | Facility: IMAGING CENTER | Age: 58
End: 2023-05-15
Payer: MEDICAID

## 2023-05-15 DIAGNOSIS — M54.16 RADICULOPATHY, LUMBAR REGION: ICD-10-CM

## 2023-05-15 PROCEDURE — 72110 X-RAY EXAM L-2 SPINE 4/>VWS: CPT | Mod: 26

## 2023-05-15 PROCEDURE — 72110 X-RAY EXAM L-2 SPINE 4/>VWS: CPT

## 2023-05-18 ENCOUNTER — OUTPATIENT (OUTPATIENT)
Dept: OUTPATIENT SERVICES | Facility: HOSPITAL | Age: 58
LOS: 1 days | End: 2023-05-18
Payer: MEDICAID

## 2023-05-18 ENCOUNTER — APPOINTMENT (OUTPATIENT)
Dept: CT IMAGING | Facility: IMAGING CENTER | Age: 58
End: 2023-05-18

## 2023-05-18 ENCOUNTER — APPOINTMENT (OUTPATIENT)
Dept: CT IMAGING | Facility: IMAGING CENTER | Age: 58
End: 2023-05-18
Payer: MEDICAID

## 2023-05-18 DIAGNOSIS — R05.9 COUGH, UNSPECIFIED: ICD-10-CM

## 2023-05-18 PROCEDURE — 71250 CT THORAX DX C-: CPT | Mod: 26

## 2023-05-18 PROCEDURE — 71250 CT THORAX DX C-: CPT

## 2023-05-23 ENCOUNTER — APPOINTMENT (OUTPATIENT)
Dept: PHYSICAL MEDICINE AND REHAB | Facility: CLINIC | Age: 58
End: 2023-05-23
Payer: MEDICAID

## 2023-05-23 VITALS — OXYGEN SATURATION: 99 % | HEART RATE: 80 BPM | DIASTOLIC BLOOD PRESSURE: 82 MMHG | SYSTOLIC BLOOD PRESSURE: 133 MMHG

## 2023-05-23 PROCEDURE — 99214 OFFICE O/P EST MOD 30 MIN: CPT

## 2023-05-23 NOTE — HISTORY OF PRESENT ILLNESS
[FreeTextEntry1] : MARCUS RUELAS is here for follow up. Patient was seen by Dr. Rojas on 5/8/23: "57 y/o M history of lower back pain and bilateral lower extremity numbness since 2020. Back pain is intermittent and lower extremity numbness in the bilateral aspect of anterior and lateral thighs. The numbness happens with prolonged period of standing. No problem with walking, no weakness, saddle anesthesia, bladder or bowel issues. He has been doing PT with improvement. Last MRI lumbar spine in March, 2020 reviewed and discussed with patient. His MRI with moderate to severe multilevel spondylosis with multilevel disc bulges most prominent at L3-4 with severe spinal stenosis, L4-5 lateral recess stenosis and R L5-S1 foraminal stenosis. \par Overall he is doing great clinically. Will have him continue PT at this time and watch for any worsening numbness. \par We can consider repeating his MRI if his symptoms worsen and discuss potential decompression at L3-4 level. \par Can obtain x-ray flexion extension at the meantime."\par \par Since last visit patient had also had XR dynamic study of the lumbar spine. He had completed 4 sessions of PT and is approved for 4 more. He is planning to complete the full course of therapy. Pain is 4/10 on the NRS over the left lower back.\par \par Denies any new pain, numbness or weakness, bowel/bladder dysfunction, saddle anesthesia, fevers, chills, weight loss, night pain, or night sweats at this time.\par \par From last visit on 4/6/23:\par \par MARCUS RUELAS is an 58 year old M here for initial evaluation of bilateral leg numbness (L>R) and pain. He was last seen 6/2021 by Dr. Ch.\par \par Pain location: both legs\par Quality: aching\par Radiation: none\par Severity: 3/10\par Onset: years ago\par Associated symptoms: numbness along lateral aspect of both thighs\par Numbness: yes\par Weakness: none\par Exacerbated by: walking long distances\par Improved by: rest\par Bowel or bladder involvement: none\par \par Denies bowel/bladder dysfunction, saddle anesthesia, fevers, chills, weight loss, night pain, or night sweats at this time.\par \par Patient had tried Acetaminophen, NSAIDs, muscle relaxers, gabapentin. He had a series of 3 L4-L5, L5-S1 TFESI with last one on 1/15/21 and has had good pain relief since then.\par \par Patient had imaging studies to evaluate the pain. Lumbar MRI 3/18/21 was reviewed during this visit.\par Patient had not had any nerve conduction studies to evaluate the symptoms. \par Patient has not had physical therapy within the past 2 years.\par No history of back surgery.

## 2023-05-23 NOTE — REVIEW OF SYSTEMS
[Chest Pain] : no chest pain [Shortness Of Breath] : no shortness of breath [Abdominal Pain] : no abdominal pain [Dysuria] : no dysuria [Negative] : Constitutional [FreeTextEntry9] : back pain

## 2023-05-23 NOTE — ASSESSMENT
[FreeTextEntry1] : Mr. MARCUS RUELAS is a 58 year-old M with numbness and pain in both legs left > right, numbness worse than pain at this time. He reports chronic long standing pain. There are no myelopathic signs on today's exam.\par \par Patient reassured and educated on the diagnosis and treatment options. Risks and benefits of treatment and of delaying treatment discussed with patient. Risks discussed include but not limited to: progression of symptoms, worsening pain and functional status, etc.\par \par XR lumbar spine dynamic study imaging reviewed with patient in office today. Imaging and report demonstrate: stable lumbar spine without severe listhesis, DJD, fused osteophytic TPs\par \par Dr. Rojas's note reviewed\par \par Start Celebrex 200 mg PO daily x 30 days PRN pain with food #30. Denies CKD, CAD, or gastritis. Recommend that if patient develops GI symptoms including abdominal pain, nausea, or vomiting to discontinue use of medication immediately.\par \par Continue PT\par \par Follow up with Dr. Rojas neurosurgery\par Return for follow up in 4 weeks\par If no relief of symptoms and no surgery will offer spinal injection; SIJ vs TFESI\par \par I have personally spent a total of at least 35 minutes preparing, reviewing internal and external records, explaining, counseling, and coordinating care for this patient encounter.\par \par Patient was advised if the following symptoms develop: chills, fever, loss of bladder control, bowel incontinence or urinary retention, numbness/tingling or weakness is present in upper or lower extremities, to go to the nearest emergency room. This may be a new clinical condition not present at the time of the patient visit that may lead to paralysis and/or death. Patient advised if the above symptoms developed to also call the office immediately to inform us and to go to the nearest emergency room.\par \par This note was generated using Dragon medical dictation software. A reasonable effort had been made for proofreading its contents, but spelling mistakes or grammatical errors may still remain. If there are any questions or points of clarification needed please notify my office.

## 2023-05-24 ENCOUNTER — APPOINTMENT (OUTPATIENT)
Dept: INTERNAL MEDICINE | Facility: CLINIC | Age: 58
End: 2023-05-24
Payer: MEDICAID

## 2023-05-24 ENCOUNTER — OUTPATIENT (OUTPATIENT)
Dept: OUTPATIENT SERVICES | Facility: HOSPITAL | Age: 58
LOS: 1 days | End: 2023-05-24

## 2023-05-24 VITALS
BODY MASS INDEX: 28.88 KG/M2 | DIASTOLIC BLOOD PRESSURE: 62 MMHG | HEART RATE: 83 BPM | WEIGHT: 184 LBS | RESPIRATION RATE: 16 BRPM | HEIGHT: 67 IN | OXYGEN SATURATION: 98 % | SYSTOLIC BLOOD PRESSURE: 106 MMHG

## 2023-05-24 PROCEDURE — 99214 OFFICE O/P EST MOD 30 MIN: CPT | Mod: GC

## 2023-05-24 NOTE — PLAN
[FreeTextEntry1] : RTC in 10 days with PCP Dr. Mendoza for f/u of stye. Patient can cancel apt if stye resolves by then. Also wants to discuss increasing his metformin with PCP, can be done at that time as well. \par \par D/w and examined with Dr. Gonzales\par \par Beny Collins PGY-2\par [employer] [facility]

## 2023-05-24 NOTE — ASSESSMENT
[FreeTextEntry1] : 58 year old male with HTN, HLD, T2DM (recent Dx, A1C 6.5), sciatica (2/2 multilevel spondylosis), obesity and seasonal allergies presenting for acute visit for acute L eye stye.

## 2023-05-24 NOTE — PHYSICAL EXAM
[Normal Outer Ear/Nose] : the outer ears and nose were normal in appearance [Normal Oropharynx] : the oropharynx was normal [Normal TMs] : both tympanic membranes were normal [Normal Nasal Mucosa] : the nasal mucosa was normal [No Respiratory Distress] : no respiratory distress  [Normal Rate] : normal rate  [Normal] : affect was normal and insight and judgment were intact [de-identified] : + L mid-upper eyelid stye. Whiteish material protruding, no clear outlet for drainage. No erythema, no tenderness. No visual changes.

## 2023-05-24 NOTE — HISTORY OF PRESENT ILLNESS
[FreeTextEntry8] : 58 year old male with HTN, HLD, T2DM (recent Dx, A1C 6.5), sciatica (2/2 multilevel spondylosis), obesity and seasonal allergies presenting for acute visit for acute L eye stye. \par \par Stye: Patient with 1-1.5 week history of worsening L upper eyelid, midline, stye. Denies pain, tenderness, fevers, chills, sick contacts, sore throat, mouth sores. Denies drainage, denies vision changes or eye pain, denies ever having this in the past. \par \par Patient reports he does not wake up with eyelids stuck together. He reports the lesion is bothersome as he feels it when he blinks and it is in his field of view.

## 2023-05-25 DIAGNOSIS — H00.014 HORDEOLUM EXTERNUM LEFT UPPER EYELID: ICD-10-CM

## 2023-05-25 DIAGNOSIS — E11.9 TYPE 2 DIABETES MELLITUS WITHOUT COMPLICATIONS: ICD-10-CM

## 2023-05-26 LAB
ALBUMIN SERPL ELPH-MCNC: 4.7 G/DL
ALP BLD-CCNC: 66 U/L
ALT SERPL-CCNC: 21 U/L
ANION GAP SERPL CALC-SCNC: 12 MMOL/L
AST SERPL-CCNC: 27 U/L
BASOPHILS # BLD AUTO: 0.13 K/UL
BASOPHILS NFR BLD AUTO: 1.3 %
BILIRUB SERPL-MCNC: 0.2 MG/DL
BUN SERPL-MCNC: 17 MG/DL
CALCIUM SERPL-MCNC: 9.9 MG/DL
CHLORIDE SERPL-SCNC: 100 MMOL/L
CHOLEST SERPL-MCNC: 162 MG/DL
CO2 SERPL-SCNC: 25 MMOL/L
CREAT SERPL-MCNC: 0.85 MG/DL
EGFR: 103 ML/MIN/1.73M2
EOSINOPHIL # BLD AUTO: 1.6 K/UL
EOSINOPHIL NFR BLD AUTO: 16.5 %
ESTIMATED AVERAGE GLUCOSE: 140 MG/DL
GLUCOSE SERPL-MCNC: 102 MG/DL
HBA1C MFR BLD HPLC: 6.5 %
HCT VFR BLD CALC: 43.2 %
HDLC SERPL-MCNC: 48 MG/DL
HGB BLD-MCNC: 14 G/DL
IMM GRANULOCYTES NFR BLD AUTO: 0.2 %
LDLC SERPL CALC-MCNC: 86 MG/DL
LYMPHOCYTES # BLD AUTO: 1.81 K/UL
LYMPHOCYTES NFR BLD AUTO: 18.6 %
MAN DIFF?: NORMAL
MCHC RBC-ENTMCNC: 29.2 PG
MCHC RBC-ENTMCNC: 32.4 GM/DL
MCV RBC AUTO: 90 FL
MONOCYTES # BLD AUTO: 0.73 K/UL
MONOCYTES NFR BLD AUTO: 7.5 %
NEUTROPHILS # BLD AUTO: 5.42 K/UL
NEUTROPHILS NFR BLD AUTO: 55.9 %
NONHDLC SERPL-MCNC: 114 MG/DL
PLATELET # BLD AUTO: 230 K/UL
POTASSIUM SERPL-SCNC: 4.8 MMOL/L
PROT SERPL-MCNC: 7.6 G/DL
PSA SERPL-MCNC: 1.49 NG/ML
RBC # BLD: 4.8 M/UL
RBC # FLD: 13.6 %
SODIUM SERPL-SCNC: 137 MMOL/L
TRIGL SERPL-MCNC: 143 MG/DL
WBC # FLD AUTO: 9.71 K/UL

## 2023-05-31 ENCOUNTER — OUTPATIENT (OUTPATIENT)
Dept: OUTPATIENT SERVICES | Facility: HOSPITAL | Age: 58
LOS: 1 days | End: 2023-05-31

## 2023-05-31 ENCOUNTER — APPOINTMENT (OUTPATIENT)
Dept: INTERNAL MEDICINE | Facility: CLINIC | Age: 58
End: 2023-05-31
Payer: MEDICAID

## 2023-05-31 VITALS
SYSTOLIC BLOOD PRESSURE: 126 MMHG | WEIGHT: 180 LBS | OXYGEN SATURATION: 98 % | BODY MASS INDEX: 28.25 KG/M2 | HEART RATE: 82 BPM | HEIGHT: 67 IN | DIASTOLIC BLOOD PRESSURE: 72 MMHG

## 2023-05-31 LAB — HBA1C MFR BLD HPLC: 6.3

## 2023-05-31 PROCEDURE — 99214 OFFICE O/P EST MOD 30 MIN: CPT | Mod: GC

## 2023-05-31 NOTE — PHYSICAL EXAM
[No Acute Distress] : no acute distress [Well Nourished] : well nourished [Well Developed] : well developed [Well-Appearing] : well-appearing [Normal Sclera/Conjunctiva] : normal sclera/conjunctiva [PERRL] : pupils equal round and reactive to light [EOMI] : extraocular movements intact [Normal Outer Ear/Nose] : the outer ears and nose were normal in appearance [Normal Oropharynx] : the oropharynx was normal [No JVD] : no jugular venous distention [No Lymphadenopathy] : no lymphadenopathy [Supple] : supple [Thyroid Normal, No Nodules] : the thyroid was normal and there were no nodules present [No Respiratory Distress] : no respiratory distress  [No Accessory Muscle Use] : no accessory muscle use [Clear to Auscultation] : lungs were clear to auscultation bilaterally [Normal Rate] : normal rate  [Regular Rhythm] : with a regular rhythm [Normal S1, S2] : normal S1 and S2 [No Murmur] : no murmur heard [No Carotid Bruits] : no carotid bruits [No Abdominal Bruit] : a ~M bruit was not heard ~T in the abdomen [No Varicosities] : no varicosities [Pedal Pulses Present] : the pedal pulses are present [No Edema] : there was no peripheral edema [No Palpable Aorta] : no palpable aorta [No Extremity Clubbing/Cyanosis] : no extremity clubbing/cyanosis [Soft] : abdomen soft [Non-distended] : non-distended [Non Tender] : non-tender [No HSM] : no HSM [No Masses] : no abdominal mass palpated [Normal Bowel Sounds] : normal bowel sounds [No CVA Tenderness] : no CVA  tenderness [No Spinal Tenderness] : no spinal tenderness [No Joint Swelling] : no joint swelling [Grossly Normal Strength/Tone] : grossly normal strength/tone [No Rash] : no rash [Coordination Grossly Intact] : coordination grossly intact [No Focal Deficits] : no focal deficits [Normal Affect] : the affect was normal [Normal Insight/Judgement] : insight and judgment were intact

## 2023-06-01 DIAGNOSIS — R05.9 COUGH, UNSPECIFIED: ICD-10-CM

## 2023-06-01 DIAGNOSIS — E11.9 TYPE 2 DIABETES MELLITUS WITHOUT COMPLICATIONS: ICD-10-CM

## 2023-06-01 DIAGNOSIS — H00.014 HORDEOLUM EXTERNUM LEFT UPPER EYELID: ICD-10-CM

## 2023-06-01 NOTE — PLAN
[FreeTextEntry1] : 58 year old male with HTN, HLD, T2DM (recent Dx, A1C 6.5), sciatica (2/2 multilevel spondylosis), obesity, chronic cough,and seasonal allergies presenting for follow up visit for L eye stye and chronic cough. \par \par #L eye stye\par Persistent stye despite 3 weeks of warm compress. \par -start doxycycline 100 bid for 5 days, can continue for 7 if stye persists\par -c/w warm compresses\par -lid hygiene\par \par #Chronic cough\par Cough now for 2 years per patient (however on chart review, cough began in 2019). Pt w/ dry cough during exam. Eosinophils trended down although remain elevated. Hypereosinophilic w/u negative. CXR negative. CT chest showing intrapulmonary nodule. PFTs showing mildly obstructive restrictive pattern, mildly decreased DLCO. Negative bronchodilator response. Mild improvement with ppi (placebo?) Ddx: eosinophilic bronchitis vs. CEP vs. GERD vs. upper airway cough syndrome vs. asthma\par -asked pt to c/w fluticasone nasal spray and loratadine for now\par -renewed pantoprazole\par -pulmonology referral given persistent sx and failed trials, bronchoscopy warranted?\par \par #Diabetes Type 2\par -A1c excellent at 6.3 <-- 6.5\par -c/w with lifestyle changes\par -c/w metformin\par \par #HTN\par -well controlled (126/72)\par -c/w losartan\par \par #HLD\par -c/w atorvastatin\par -will recheck lipids in 12/23\par \par #HCM\par Vaccines:\par -COVID: x2 2nd dose pfizer 3/28/21\par -Flu: 12/9/22\par -TDAP: 3/13/19\par -PCV/PPSV: 11/9/17, 4/2/12\par -Shingrix: pt will get at pharmacy\par \par Screening:\par -Colonoscopy: done 2017, due 2027\par -Lipids, A1c: 12/22 normal\par -PSA: normal range 12/22\par \par Nunu Gliagias, PGY-1\par Case Discussed with Dr. Gonzales\par Internal Medicine, [facility]

## 2023-06-01 NOTE — HISTORY OF PRESENT ILLNESS
[FreeTextEntry1] : 58 year old male with HTN, HLD, T2DM (recent Dx, A1C 6.5), sciatica (2/2 multilevel spondylosis), obesity, chronic cough,and seasonal allergies presenting for follow up visit for L eye stye and chronic cough.  [de-identified] : 58 year old male with HTN, HLD, T2DM (recent Dx, A1C 6.5), sciatica (2/2 multilevel spondylosis), obesity, chronic cough, and seasonal allergies presenting for follow up visit for L eye stye and chronic cough. \par \par #L stye: Pt stated that he has had a stye for the past three weeks. It has gotten smaller with warm compresses but it still there and bothers him a lot. Pt denied rubbing eyes.\par \par #Cough: Pt stated he continues to have the cough. He has been using fluticasone bid and loratadine daily with no relief. He stated that the pantoprazole helped slightly. Asked for refill. Denied fever, night sweats, hemoptysis, weight loss.\par \par Also discussed pt's children, his son is about to start college at [employer].

## 2023-06-14 ENCOUNTER — NON-APPOINTMENT (OUTPATIENT)
Age: 58
End: 2023-06-14

## 2023-06-14 ENCOUNTER — APPOINTMENT (OUTPATIENT)
Dept: OPHTHALMOLOGY | Facility: CLINIC | Age: 58
End: 2023-06-14
Payer: COMMERCIAL

## 2023-06-14 PROCEDURE — 92012 INTRM OPH EXAM EST PATIENT: CPT

## 2023-06-28 ENCOUNTER — APPOINTMENT (OUTPATIENT)
Dept: PHYSICAL MEDICINE AND REHAB | Facility: CLINIC | Age: 58
End: 2023-06-28
Payer: MEDICAID

## 2023-06-28 DIAGNOSIS — M54.59 OTHER LOW BACK PAIN: ICD-10-CM

## 2023-06-28 PROCEDURE — 99213 OFFICE O/P EST LOW 20 MIN: CPT

## 2023-06-28 NOTE — ASSESSMENT
[FreeTextEntry1] : Mr. MARCUS RUELAS is a 58 year-old M with numbness and pain in both legs left > right due to radiculopathy and spondylosis. He reports chronic long standing pain. There are no myelopathic signs on today's exam.\par \par Patient reassured and educated on the diagnosis and treatment options. Risks and benefits of treatment and of delaying treatment discussed with patient. Risks discussed include but not limited to: progression of symptoms, worsening pain and functional status, etc.\par \par XR lumbar spine dynamic study imaging reviewed. Imaging and report demonstrate: stable lumbar spine without severe listhesis, DJD, fused osteophytic TPs\par \par Take Celebrex 200 mg PO daily x 30 days PRN pain with food #30. Denies CKD, CAD, or gastritis. Recommend that if patient develops GI symptoms including abdominal pain, nausea, or vomiting to discontinue use of medication immediately.\par \par Sending patient for PT for lumbar radiculopathy and spondylosis to help relieve pain and improve function. Stretching, strengthening, ROM, home education and other appropriate interventions. Precautions include fall prevention.\par \par Follow up with Dr. Rojas neurosurgery\par Return for follow up in 2 months\par If no relief of symptoms and no surgery will offer spinal injection; SIJ vs TFESI\par \par I have personally spent a total of at least 25 minutes preparing, reviewing internal and external records, explaining, counseling, and coordinating care for this patient encounter.\par \par Patient was advised if the following symptoms develop: chills, fever, loss of bladder control, bowel incontinence or urinary retention, numbness/tingling or weakness is present in upper or lower extremities, to go to the nearest emergency room. This may be a new clinical condition not present at the time of the patient visit that may lead to paralysis and/or death. Patient advised if the above symptoms developed to also call the office immediately to inform us and to go to the nearest emergency room.\par \par This note was generated using Dragon medical dictation software. A reasonable effort had been made for proofreading its contents, but spelling mistakes or grammatical errors may still remain. If there are any questions or points of clarification needed please notify my office. \par

## 2023-06-28 NOTE — HISTORY OF PRESENT ILLNESS
[FreeTextEntry1] : MARCUS RUELAS is here for follow up. He has been doing PT with improvement of his symptoms. He hasn't gone back to see Dr. Rojas. However, his XR lumbar spine came back negative for any spinal instability. He took Celebrex PRN and notes that it helped with his acute pain flareups. He still has some left. He wishe to avoid surgery and injections and wants to go for more PT. Pain is across the lower back and down legs. \par \par Denies any new pain, numbness or weakness, bowel/bladder dysfunction, saddle anesthesia, fevers, chills, weight loss, night pain, or night sweats at this time.\par \par Last note from on 5/23/23:\par MARCUS RUELAS is here for follow up. Patient was seen by Dr. Rojas on 5/8/23: "57 y/o M history of lower back pain and bilateral lower extremity numbness since 2020. Back pain is intermittent and lower extremity numbness in the bilateral aspect of anterior and lateral thighs. The numbness happens with prolonged period of standing. No problem with walking, no weakness, saddle anesthesia, bladder or bowel issues. He has been doing PT with improvement. Last MRI lumbar spine in March, 2020 reviewed and discussed with patient. His MRI with moderate to severe multilevel spondylosis with multilevel disc bulges most prominent at L3-4 with severe spinal stenosis, L4-5 lateral recess stenosis and R L5-S1 foraminal stenosis. \par Overall he is doing great clinically. Will have him continue PT at this time and watch for any worsening numbness. \par We can consider repeating his MRI if his symptoms worsen and discuss potential decompression at L3-4 level. \par Can obtain x-ray flexion extension at the meantime."\par \par Since last visit patient had also had XR dynamic study of the lumbar spine. He had completed 4 sessions of PT and is approved for 4 more. He is planning to complete the full course of therapy. Pain is 4/10 on the NRS over the left lower back.\par \par Denies any new pain, numbness or weakness, bowel/bladder dysfunction, saddle anesthesia, fevers, chills, weight loss, night pain, or night sweats at this time.\par \par From last visit on 4/6/23:\par \par MARCUS RUELAS is an 58 year old M here for initial evaluation of bilateral leg numbness (L>R) and pain. He was last seen 6/2021 by Dr. Ch.\par \par Pain location: both legs\par Quality: aching\par Radiation: none\par Severity: 3/10\par Onset: years ago\par Associated symptoms: numbness along lateral aspect of both thighs\par Numbness: yes\par Weakness: none\par Exacerbated by: walking long distances\par Improved by: rest\par Bowel or bladder involvement: none\par \par Denies bowel/bladder dysfunction, saddle anesthesia, fevers, chills, weight loss, night pain, or night sweats at this time.\par \par Patient had tried Acetaminophen, NSAIDs, muscle relaxers, gabapentin. He had a series of 3 L4-L5, L5-S1 TFESI with last one on 1/15/21 and has had good pain relief since then.\par \par Patient had imaging studies to evaluate the pain. Lumbar MRI 3/18/21 was reviewed during this visit.\par Patient had not had any nerve conduction studies to evaluate the symptoms. \par Patient has not had physical therapy within the past 2 years.\par No history of back surgery.

## 2023-08-10 ENCOUNTER — APPOINTMENT (OUTPATIENT)
Dept: PULMONOLOGY | Facility: CLINIC | Age: 58
End: 2023-08-10
Payer: MEDICAID

## 2023-08-10 VITALS
BODY MASS INDEX: 29.35 KG/M2 | DIASTOLIC BLOOD PRESSURE: 73 MMHG | TEMPERATURE: 97.8 F | RESPIRATION RATE: 15 BRPM | OXYGEN SATURATION: 100 % | WEIGHT: 187 LBS | HEART RATE: 75 BPM | SYSTOLIC BLOOD PRESSURE: 123 MMHG | HEIGHT: 67 IN

## 2023-08-10 VITALS
SYSTOLIC BLOOD PRESSURE: 123 MMHG | HEIGHT: 67 IN | BODY MASS INDEX: 29.35 KG/M2 | OXYGEN SATURATION: 100 % | HEART RATE: 75 BPM | WEIGHT: 187 LBS | DIASTOLIC BLOOD PRESSURE: 73 MMHG

## 2023-08-10 PROCEDURE — 99203 OFFICE O/P NEW LOW 30 MIN: CPT | Mod: 25

## 2023-08-10 PROCEDURE — 94010 BREATHING CAPACITY TEST: CPT

## 2023-08-10 PROCEDURE — 94726 PLETHYSMOGRAPHY LUNG VOLUMES: CPT

## 2023-08-10 PROCEDURE — ZZZZZ: CPT

## 2023-08-10 PROCEDURE — 94729 DIFFUSING CAPACITY: CPT

## 2023-08-10 NOTE — DISCUSSION/SUMMARY
[FreeTextEntry1] : Mr. Jeffers is a 58 year old male who comes in for evaluation of prolonged cough since November.  He has allergic rhinitsi and GERD and has been prescribed treatment for both, although not using eihter on a regular basis.  He denies history of asthma and has never smoked. His cough is gradually improving.  ON PE today his VSS and lungs are clear.  There is no edema.    He had Chest CT on 5/18/23 which showed a subpleural 3mm lung nodule likely intrapulmonary LN.  Does not require followup.  LUngs otherwise clear.  PFTs today show normal spirometry, lung volumes.  DLCO is mildly reduced.  IMpression:  UACS REc  continue to treat upper airway with fluticasone, omeprazole ENT referral NO evidence of asthma currently. F/U as needed.

## 2023-08-10 NOTE — HISTORY OF PRESENT ILLNESS
[TextBox_4] : 58 year old male with hypertension, HLD, T2DM, sciatica, obesity, seasonal allergies and chronic cough.  Had been on loratadine, fluticasone without relief.  pantoprazole had helped.  REferred for evaluation.    Cough since started in November, after the fall and continued until the spring.  he is overall feeling better.  Clearing his throat.   Denies history of asthma. Never smoked,  Works in [employer].  Wers mask on the job.  Exposed to freon, nitrogen, CO2 has GERD on and off.  No shortness of breath on exertion.

## 2023-08-10 NOTE — PHYSICAL EXAM
[No Acute Distress] : no acute distress [Normal Oropharynx] : normal oropharynx [II] : Mallampati Class: II [Normal Appearance] : normal appearance [No Neck Mass] : no neck mass [Normal Rate/Rhythm] : normal rate/rhythm [Normal S1, S2] : normal s1, s2 [No Murmurs] : no murmurs [No Resp Distress] : no resp distress [Clear to Auscultation Bilaterally] : clear to auscultation bilaterally [No Clubbing] : no clubbing [No Cyanosis] : no cyanosis [No Edema] : no edema [Oriented x3] : oriented x3 [Normal Affect] : normal affect

## 2023-08-30 ENCOUNTER — APPOINTMENT (OUTPATIENT)
Dept: PHYSICAL MEDICINE AND REHAB | Facility: CLINIC | Age: 58
End: 2023-08-30
Payer: MEDICAID

## 2023-08-30 VITALS — OXYGEN SATURATION: 98 % | DIASTOLIC BLOOD PRESSURE: 66 MMHG | SYSTOLIC BLOOD PRESSURE: 101 MMHG | HEART RATE: 77 BPM

## 2023-08-30 PROCEDURE — 99213 OFFICE O/P EST LOW 20 MIN: CPT

## 2023-08-30 NOTE — HISTORY OF PRESENT ILLNESS
[FreeTextEntry1] : MARCUS RUELAS is here for follow up. Since last visit he had been doing PT with slow but steady imprvoemebt. He had not gone to speak with NSX yet. He notes pain in the middle lower back, about 8/10 with prologned sitting or standing.   Denies any new pain, numbness or weakness, bowel/bladder dysfunction, saddle anesthesia, fevers, chills, weight loss, night pain, or night sweats at this time.

## 2023-08-30 NOTE — REVIEW OF SYSTEMS
[Abdominal Pain] : no abdominal pain [Dysuria] : no dysuria [Negative] : Constitutional [FreeTextEntry9] : bcak pain

## 2023-08-30 NOTE — ASSESSMENT
[FreeTextEntry1] : Mr. MARCUS RUELAS is a 58 year-old M with numbness and pain in both legs left > right due to radiculopathy and spondylosis. He reports chronic long standing pain. There are no myelopathic signs on today's exam.   Patient reassured and educated on the diagnosis and treatment options. Risks and benefits of treatment and of delaying treatment discussed with patient. Risks discussed include but not limited to: progression of symptoms, worsening pain and functional status, etc.  This note was generated using Dragon medical dictation software. A reasonable effort had been made for proofreading its contents, but spelling mistakes or grammatical errors may still remain. If there are any questions or points of clarification needed please notify my office.    XR lumbar spine dynamic study imaging reviewed. Imaging and report demonstrate: stable lumbar spine without severe listhesis, DJD, fused osteophytic TPs    Still has: Celebrex 200 mg PO daily x 30 days PRN pain with food #30. Denies CKD, CAD, or gastritis. Recommend that if patient develops GI symptoms including abdominal pain, nausea, or vomiting to discontinue use of medication immediately.  Rx for Voltaren gel Rx for [facility] Continue PT for lumbar radiculopathy and spondylosis to help relieve pain and improve function. Stretching, strengthening, ROM, home education and other appropriate interventions. Precautions include fall prevention.  Follow up with Dr. Rojas neurosurgery  Return for follow up in 1 month If continues to have pain will start with TP injections  If no relief of symptoms and no surgery will offer spinal injection; SIJ vs TFESI   I have personally spent a total of at least 25 minutes preparing, reviewing internal and external records, explaining, counseling, and coordinating care for this patient encounter.   Patient was advised if the following symptoms develop: chills, fever, loss of bladder control, bowel incontinence or urinary retention, numbness/tingling or weakness is present in upper or lower extremities, to go to the nearest emergency room. This may be a new clinical condition not present at the time of the patient visit that may lead to paralysis and/or death. Patient advised if the above symptoms developed to also call the office immediately to inform us and to go to the nearest emergency room.

## 2023-09-11 ENCOUNTER — APPOINTMENT (OUTPATIENT)
Dept: INTERNAL MEDICINE | Facility: CLINIC | Age: 58
End: 2023-09-11
Payer: MEDICAID

## 2023-09-11 ENCOUNTER — OUTPATIENT (OUTPATIENT)
Dept: OUTPATIENT SERVICES | Facility: HOSPITAL | Age: 58
LOS: 1 days | End: 2023-09-11

## 2023-09-11 VITALS
DIASTOLIC BLOOD PRESSURE: 73 MMHG | HEIGHT: 67 IN | WEIGHT: 191 LBS | RESPIRATION RATE: 16 BRPM | OXYGEN SATURATION: 100 % | BODY MASS INDEX: 29.98 KG/M2 | SYSTOLIC BLOOD PRESSURE: 129 MMHG | TEMPERATURE: 97.6 F | HEART RATE: 73 BPM

## 2023-09-11 DIAGNOSIS — R21 RASH AND OTHER NONSPECIFIC SKIN ERUPTION: ICD-10-CM

## 2023-09-11 DIAGNOSIS — M54.32 SCIATICA, RIGHT SIDE: ICD-10-CM

## 2023-09-11 DIAGNOSIS — M54.31 SCIATICA, RIGHT SIDE: ICD-10-CM

## 2023-09-11 DIAGNOSIS — H00.014 HORDEOLUM EXTERNUM LEFT UPPER EYELID: ICD-10-CM

## 2023-09-11 PROCEDURE — 99214 OFFICE O/P EST MOD 30 MIN: CPT | Mod: GC

## 2023-09-11 RX ORDER — PANTOPRAZOLE 40 MG/1
40 TABLET, DELAYED RELEASE ORAL
Qty: 30 | Refills: 1 | Status: DISCONTINUED | COMMUNITY
Start: 2023-03-21 | End: 2023-09-11

## 2023-09-14 DIAGNOSIS — E78.5 HYPERLIPIDEMIA, UNSPECIFIED: ICD-10-CM

## 2023-09-14 DIAGNOSIS — I10 ESSENTIAL (PRIMARY) HYPERTENSION: ICD-10-CM

## 2023-09-14 DIAGNOSIS — M54.31 SCIATICA, RIGHT SIDE: ICD-10-CM

## 2023-09-14 DIAGNOSIS — K21.9 GASTRO-ESOPHAGEAL REFLUX DISEASE WITHOUT ESOPHAGITIS: ICD-10-CM

## 2023-09-14 DIAGNOSIS — R05.9 COUGH, UNSPECIFIED: ICD-10-CM

## 2023-09-14 DIAGNOSIS — E11.9 TYPE 2 DIABETES MELLITUS WITHOUT COMPLICATIONS: ICD-10-CM

## 2023-09-14 DIAGNOSIS — H00.014 HORDEOLUM EXTERNUM LEFT UPPER EYELID: ICD-10-CM

## 2023-09-14 DIAGNOSIS — R21 RASH AND OTHER NONSPECIFIC SKIN ERUPTION: ICD-10-CM

## 2023-09-25 ENCOUNTER — OUTPATIENT (OUTPATIENT)
Dept: OUTPATIENT SERVICES | Facility: HOSPITAL | Age: 58
LOS: 1 days | End: 2023-09-25

## 2023-09-25 ENCOUNTER — APPOINTMENT (OUTPATIENT)
Dept: INTERNAL MEDICINE | Facility: CLINIC | Age: 58
End: 2023-09-25

## 2023-09-26 DIAGNOSIS — Z23 ENCOUNTER FOR IMMUNIZATION: ICD-10-CM

## 2023-09-26 DIAGNOSIS — Z00.00 ENCOUNTER FOR GENERAL ADULT MEDICAL EXAMINATION WITHOUT ABNORMAL FINDINGS: ICD-10-CM

## 2023-10-04 ENCOUNTER — APPOINTMENT (OUTPATIENT)
Dept: PHYSICAL MEDICINE AND REHAB | Facility: CLINIC | Age: 58
End: 2023-10-04
Payer: MEDICAID

## 2023-10-04 VITALS — OXYGEN SATURATION: 95 % | SYSTOLIC BLOOD PRESSURE: 123 MMHG | HEART RATE: 78 BPM | DIASTOLIC BLOOD PRESSURE: 76 MMHG

## 2023-10-04 PROCEDURE — 99213 OFFICE O/P EST LOW 20 MIN: CPT

## 2023-10-27 ENCOUNTER — RX RENEWAL (OUTPATIENT)
Age: 58
End: 2023-10-27

## 2023-12-06 ENCOUNTER — APPOINTMENT (OUTPATIENT)
Dept: PHYSICAL MEDICINE AND REHAB | Facility: CLINIC | Age: 58
End: 2023-12-06
Payer: MEDICAID

## 2023-12-06 PROCEDURE — 99213 OFFICE O/P EST LOW 20 MIN: CPT

## 2023-12-14 ENCOUNTER — NON-APPOINTMENT (OUTPATIENT)
Age: 58
End: 2023-12-14

## 2023-12-14 ENCOUNTER — APPOINTMENT (OUTPATIENT)
Dept: OPHTHALMOLOGY | Facility: CLINIC | Age: 58
End: 2023-12-14
Payer: MEDICAID

## 2023-12-14 PROCEDURE — 92083 EXTENDED VISUAL FIELD XM: CPT

## 2023-12-14 PROCEDURE — 92250 FUNDUS PHOTOGRAPHY W/I&R: CPT

## 2023-12-14 PROCEDURE — 92012 INTRM OPH EXAM EST PATIENT: CPT

## 2024-01-29 ENCOUNTER — APPOINTMENT (OUTPATIENT)
Dept: PHYSICAL MEDICINE AND REHAB | Facility: CLINIC | Age: 59
End: 2024-01-29
Payer: MEDICAID

## 2024-01-29 DIAGNOSIS — M47.817 SPONDYLOSIS W/OUT MYELOPATHY OR RADICULOPATHY, LUMBOSACRAL REGION: ICD-10-CM

## 2024-01-29 PROCEDURE — 99213 OFFICE O/P EST LOW 20 MIN: CPT

## 2024-01-29 NOTE — HISTORY OF PRESENT ILLNESS
[FreeTextEntry1] : MARCUS RUELAS is here for follow up. Since last visit he has been doing aquatherapy and was transitioned to land based PT. However insurance did not approve it. He reports overall relief of his back pains and stiffness. He is still noting about 6/10 symptom intensity on NRS, including parasthesias down the left outer and front thigh. He did not go to see NSX yet.   Denies any new pain, numbness or weakness, bowel/bladder dysfunction, saddle anesthesia, fevers, chills, weight loss, night pain, or night sweats at this time.

## 2024-01-29 NOTE — PHYSICAL EXAM
[FreeTextEntry1] : General exam   Constitutional: The patient appears well-developed, well-nourished, and in no apparent distress. Patient is well-groomed.    Skin: The skin is warm and dry, with normal turgor.  Eyes: PERRL.    ENMT: Ears: Hearing is grossly within normal limits.    Neck: Supple: The neck is supple.    Respiratory: Inspection: Breathing unlabored.    Neurologic: Alert and oriented x 3.   Psychiatric: Patient is cooperative and appropriate.  Mood and affect are normal.  Patient's insight is good, and memory and judgment are intact.  Lumbar Antalgic gait Truncal obesity Hamstring and quadriceps tightness

## 2024-01-29 NOTE — ASSESSMENT
[FreeTextEntry1] : Mr. MARCUS RUELAS is a 58 year-old M with numbness and pain in both legs left > right due to radiculopathy and spondylosis. He reports chronic long standing pain with periodic exacerbations. There are no myelopathic signs on today's exam.  Patient reassured and educated on the diagnosis and treatment options. Risks and benefits of treatment and of delaying treatment discussed with patient. Risks discussed include but not limited to: progression of symptoms, worsening pain and functional status, etc.  This note was generated using Dragon medical dictation software. A reasonable effort had been made for proofreading its contents, but spelling mistakes or grammatical errors may still remain. If there are any questions or points of clarification needed please notify my office.  Continue Celebrex 200 mg PO daily x 30 days PRN pain with food #30. Denies CKD, CAD, or gastritis. Recommend that if patient develops GI symptoms including abdominal pain, nausea, or vomiting to discontinue use of medication immediately.  Sending patient for PT for BACK PAIN to help relieve pain and improve function. Stretching, strengthening, ROM, home education and other appropriate interventions. Precautions include fall prevention.  Follow up with Dr. Rojas neurosurgery if symptoms get worse  Return for follow up in 2 months If continues to have pain will start with TP injections If no relief of symptoms and no surgery will offer spinal injection; SIJ vs TFESI  I have personally spent a total of at least 25 minutes preparing, reviewing internal and external records, explaining, counseling, and coordinating care for this patient encounter.   Patient was advised if the following symptoms develop: chills, fever, loss of bladder control, bowel incontinence or urinary retention, numbness/tingling or weakness is present in upper or lower extremities, to go to the nearest emergency room. This may be a new clinical condition not present at the time of the patient visit that may lead to paralysis and/or death. Patient advised if the above symptoms developed to also call the office immediately to inform us and to go to the nearest emergency room.

## 2024-02-16 ENCOUNTER — APPOINTMENT (OUTPATIENT)
Age: 59
End: 2024-02-16
Payer: COMMERCIAL

## 2024-02-16 PROCEDURE — D0120: CPT

## 2024-02-16 PROCEDURE — D0230: CPT

## 2024-02-16 PROCEDURE — D0220: CPT

## 2024-02-16 PROCEDURE — D0330 PANORAMIC RADIOGRAPHIC IMAGE: CPT

## 2024-02-23 ENCOUNTER — RX RENEWAL (OUTPATIENT)
Age: 59
End: 2024-02-23

## 2024-02-23 RX ORDER — FAMOTIDINE 20 MG/1
20 TABLET, FILM COATED ORAL
Qty: 16 | Refills: 0 | Status: ACTIVE | COMMUNITY
Start: 2023-09-11 | End: 1900-01-01

## 2024-03-04 ENCOUNTER — OUTPATIENT (OUTPATIENT)
Dept: OUTPATIENT SERVICES | Facility: HOSPITAL | Age: 59
LOS: 1 days | End: 2024-03-04

## 2024-03-04 ENCOUNTER — APPOINTMENT (OUTPATIENT)
Dept: INTERNAL MEDICINE | Facility: CLINIC | Age: 59
End: 2024-03-04
Payer: MEDICAID

## 2024-03-04 VITALS
HEART RATE: 75 BPM | SYSTOLIC BLOOD PRESSURE: 112 MMHG | OXYGEN SATURATION: 97 % | HEIGHT: 67 IN | BODY MASS INDEX: 29.51 KG/M2 | RESPIRATION RATE: 16 BRPM | DIASTOLIC BLOOD PRESSURE: 58 MMHG | WEIGHT: 188 LBS

## 2024-03-04 DIAGNOSIS — E55.9 VITAMIN D DEFICIENCY, UNSPECIFIED: ICD-10-CM

## 2024-03-04 DIAGNOSIS — M54.50 LOW BACK PAIN, UNSPECIFIED: ICD-10-CM
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
## 2024-03-04 PROCEDURE — 99214 OFFICE O/P EST MOD 30 MIN: CPT | Mod: GC

## 2024-03-04 RX ORDER — CAMPHOR, MENTHOL, METHYL SALICYLATE 86.24; 166.92; 278.2 MG/1; MG/1; MG/1
3.1-6-1 PATCH PERCUTANEOUS; TOPICAL; TRANSDERMAL
Qty: 30 | Refills: 1 | Status: DISCONTINUED | COMMUNITY
Start: 2023-08-30 | End: 2024-03-04

## 2024-03-04 RX ORDER — NAPROXEN 250 MG/1
250 TABLET ORAL
Qty: 60 | Refills: 1 | Status: DISCONTINUED | COMMUNITY
Start: 2023-05-23 | End: 2024-03-04

## 2024-03-04 RX ORDER — HYDROCORTISONE 10 MG/G
1 OINTMENT TOPICAL TWICE DAILY
Qty: 15 | Refills: 0 | Status: DISCONTINUED | COMMUNITY
Start: 2023-09-11 | End: 2024-03-04

## 2024-03-04 RX ORDER — DOXYCYCLINE HYCLATE 100 MG/1
100 CAPSULE ORAL TWICE DAILY
Qty: 14 | Refills: 0 | Status: DISCONTINUED | COMMUNITY
Start: 2023-05-31 | End: 2024-03-04

## 2024-03-04 RX ORDER — DICLOFENAC SODIUM 1% 10 MG/G
1 GEL TOPICAL DAILY
Qty: 3 | Refills: 2 | Status: ACTIVE | COMMUNITY
Start: 2023-08-30 | End: 1900-01-01

## 2024-03-04 RX ORDER — DICLOFENAC SODIUM 1% 10 MG/G
1 GEL TOPICAL
Qty: 100 | Refills: 0 | Status: DISCONTINUED | COMMUNITY
Start: 2023-09-11 | End: 2024-03-04

## 2024-03-04 RX ORDER — CYCLOBENZAPRINE HYDROCHLORIDE 5 MG/1
5 TABLET, FILM COATED ORAL
Qty: 30 | Refills: 0 | Status: ACTIVE | COMMUNITY
Start: 2024-03-04 | End: 1900-01-01

## 2024-03-04 RX ORDER — LIDOCAINE 4 G/100G
4 PATCH TOPICAL
Qty: 1 | Refills: 0 | Status: DISCONTINUED | COMMUNITY
Start: 2024-02-23 | End: 2024-03-04

## 2024-03-04 RX ORDER — ZOSTER VACCINE RECOMBINANT, ADJUVANTED 50 MCG/0.5
50 KIT INTRAMUSCULAR
Qty: 1 | Refills: 0 | Status: DISCONTINUED | COMMUNITY
Start: 2022-12-09 | End: 2024-03-04

## 2024-03-04 RX ORDER — LIDOCAINE 5% 700 MG/1
5 PATCH TOPICAL
Qty: 30 | Refills: 1 | Status: ACTIVE | COMMUNITY
Start: 2024-03-04 | End: 1900-01-01

## 2024-03-04 RX ORDER — LIDOCAINE 40 MG/G
4 PATCH TOPICAL
Qty: 30 | Refills: 2 | Status: DISCONTINUED | COMMUNITY
Start: 2023-08-30 | End: 2024-03-04

## 2024-03-05 PROBLEM — E55.9 VITAMIN D DEFICIENCY: Status: ACTIVE | Noted: 2024-03-05

## 2024-03-05 LAB
25(OH)D3 SERPL-MCNC: 29.3 NG/ML
ALBUMIN SERPL ELPH-MCNC: 4.6 G/DL
ALP BLD-CCNC: 61 U/L
ALT SERPL-CCNC: 27 U/L
ANION GAP SERPL CALC-SCNC: 11 MMOL/L
APPEARANCE: CLEAR
AST SERPL-CCNC: 32 U/L
BASOPHILS # BLD AUTO: 0.07 K/UL
BASOPHILS NFR BLD AUTO: 0.8 %
BILIRUB SERPL-MCNC: 0.3 MG/DL
BILIRUBIN URINE: NEGATIVE
BLOOD URINE: NEGATIVE
BUN SERPL-MCNC: 17 MG/DL
CALCIUM SERPL-MCNC: 9.8 MG/DL
CHLORIDE SERPL-SCNC: 100 MMOL/L
CHOLEST SERPL-MCNC: 113 MG/DL
CO2 SERPL-SCNC: 28 MMOL/L
COLOR: YELLOW
CREAT SERPL-MCNC: 0.94 MG/DL
EGFR: 95 ML/MIN/1.73M2
EOSINOPHIL # BLD AUTO: 0.86 K/UL
EOSINOPHIL NFR BLD AUTO: 9.5 %
ESTIMATED AVERAGE GLUCOSE: 137 MG/DL
GLUCOSE QUALITATIVE U: NEGATIVE MG/DL
GLUCOSE SERPL-MCNC: 144 MG/DL
HBA1C MFR BLD HPLC: 6.4 %
HCT VFR BLD CALC: 39.6 %
HDLC SERPL-MCNC: 47 MG/DL
HGB BLD-MCNC: 13.1 G/DL
IMM GRANULOCYTES NFR BLD AUTO: 0.2 %
KETONES URINE: NEGATIVE MG/DL
LDLC SERPL CALC-MCNC: 52 MG/DL
LEUKOCYTE ESTERASE URINE: NEGATIVE
LYMPHOCYTES # BLD AUTO: 2.01 K/UL
LYMPHOCYTES NFR BLD AUTO: 22.3 %
MAN DIFF?: NORMAL
MCHC RBC-ENTMCNC: 28.7 PG
MCHC RBC-ENTMCNC: 33.1 GM/DL
MCV RBC AUTO: 86.8 FL
MONOCYTES # BLD AUTO: 0.65 K/UL
MONOCYTES NFR BLD AUTO: 7.2 %
NEUTROPHILS # BLD AUTO: 5.42 K/UL
NEUTROPHILS NFR BLD AUTO: 60 %
NITRITE URINE: NEGATIVE
NONHDLC SERPL-MCNC: 66 MG/DL
PH URINE: 5.5
PLATELET # BLD AUTO: 223 K/UL
POTASSIUM SERPL-SCNC: 4.1 MMOL/L
PROT SERPL-MCNC: 7.2 G/DL
PROTEIN URINE: NORMAL MG/DL
PSA SERPL-MCNC: 1.88 NG/ML
RBC # BLD: 4.56 M/UL
RBC # FLD: 13.5 %
SODIUM SERPL-SCNC: 139 MMOL/L
SPECIFIC GRAVITY URINE: 1.03
TRIGL SERPL-MCNC: 68 MG/DL
TSH SERPL-ACNC: 0.88 UIU/ML
UROBILINOGEN URINE: 1 MG/DL
VIT B12 SERPL-MCNC: 840 PG/ML
WBC # FLD AUTO: 9.03 K/UL

## 2024-03-05 RX ORDER — CHOLECALCIFEROL (VITAMIN D3) 25 MCG
25 MCG TABLET ORAL
Qty: 90 | Refills: 3 | Status: ACTIVE | COMMUNITY
Start: 2024-03-05 | End: 1900-01-01

## 2024-03-06 NOTE — PHYSICAL EXAM
[No Spinal Tenderness] : no spinal tenderness [Coordination Grossly Intact] : coordination grossly intact [No Focal Deficits] : no focal deficits [Normal] : normal gait, coordination grossly intact, no focal deficits and deep tendon reflexes were 2+ and symmetric [de-identified] : Mucus in oropharynx, mild erythema [de-identified] : Spasm of back of neck [de-identified] : Paraspinal lumbar back tenderness by buttocks

## 2024-03-06 NOTE — REVIEW OF SYSTEMS
[Cough] : cough [Muscle Pain] : muscle pain [Back Pain] : back pain [Headache] : headache [Negative] : Psychiatric [FreeTextEntry5] : occasional chest burn

## 2024-03-06 NOTE — ASSESSMENT
[FreeTextEntry1] : 58 year old male with HTN, HLD, T2DM (recent Dx, A1C 6.5), sciatica (2/2 multilevel spondylosis), obesity, chronic cough,and seasonal allergies presenting for follow up visit for A1c check and chronic cough.  #Mechanical fall Landed on his buttocks and hit the back of his head, denied LOC. Endorsed persistent headache which waxes and wanes in severity. 7/10 at its worst, improves 3/10 with tylenol. -ordered CT head non con in context of persistent symptoms, although no zulma concerns on neurologic exam -rxed cyclobenzaprine 5 mg at bedtime prn for neck pain -encouraged to take tylenol extra strength up to tid -rxed lidocaine patches for lower back, may use diclofenac gel as needed   #Type 2 Diabetes Pt is trying to lose weight, trying to make changes to diet. -ordered A1c  -c/w metformin 1000 bid -B12 WNL 3/2024 -c/w lifestyle changes -Check urine microalbumin at next visit   #Chronic cough Pt stated that his cough has come back, since he stopped using flonase a while ago. Still uses pepcid on occasion. Saw pulmonology, attributed to [facility] -encouraged pt to use flonase two sprays bid daily.  -c/w pepcid PRN for reflux -c/w loratadine PRN for allergy sx -Persistent eosinophilia on CBC, if persistent cough despite therapy as above, would send back to Pulm for evaluation/treatment of possible eosinophilic bronchitis/asthma  #GERD Pt stated he still has burning of chest on occasion, relieved by pepcid, likely GERD symptoms  -c/w pepcid PRN  #HTN -well controlled (112/58) -c/w losartan daily  #HLD -c/w atorvastatin 40  -ordered lipid profile   #HCM  Vaccines: -COVID: x2 2nd dose pfizer 3/28/21 -Flu: 12/9/22 -TDAP: 3/13/19 -PCV/PPSV: 11/9/17, 4/2/12 -Shingrix: pt will get at pharmacy  Screening: -Colonoscopy: done 2017, due 2027 -Lipids, A1c: 12/22 normal, recheck today -PSA: normal range 12/22, recheck today  Nunu Gliagias, PGY-2  Case Discussed with Dr. ADRIEL Cameron  [facility]  RTC in 5 weeks to f/u CT head, headaches, GERD symptoms (if persist, can check EKG) bloodwork results

## 2024-03-06 NOTE — HISTORY OF PRESENT ILLNESS
[FreeTextEntry1] : cough   [de-identified] : 58 year old male with HTN, HLD, T2DM (recent Dx, A1C 6.5), sciatica (2/2 multilevel spondylosis), obesity, chronic cough,and seasonal allergies presenting for follow up visit for A1c check and chronic cough.  #Accidental fall: pt stated that 2 days ago, he slipped on the road when it was raining, landed on his buttocks and hit the back of his head. He denied LOC. Endorsed persistent headache which waxes and wanes in severity. 7/10 at its worst, improves 3/10 with tylenol. Endorsed dizziness/ lightheadedness initially which is improving. Endorsed softness of back of head. Also stated he feels stiffness of his neck and soreness of body.   #Prediabetes: Pt is trying to lose weight, trying to make changes to diet.  #Chronic cough: Pt stated that his cough has come back, since he stopped using flonase a while ago. Still uses pepcid on occasion.  #Occasional chest burning: Pt stated he still has burning of chest on occasion, relieved by pepcid. Would like an EKG to follow up next visit.

## 2024-03-07 DIAGNOSIS — E11.9 TYPE 2 DIABETES MELLITUS WITHOUT COMPLICATIONS: ICD-10-CM

## 2024-03-07 DIAGNOSIS — I10 ESSENTIAL (PRIMARY) HYPERTENSION: ICD-10-CM

## 2024-03-07 DIAGNOSIS — R51.9 HEADACHE, UNSPECIFIED: ICD-10-CM

## 2024-03-07 DIAGNOSIS — J30.2 OTHER SEASONAL ALLERGIC RHINITIS: ICD-10-CM

## 2024-03-07 DIAGNOSIS — M54.50 LOW BACK PAIN, UNSPECIFIED: ICD-10-CM

## 2024-03-07 DIAGNOSIS — K21.9 GASTRO-ESOPHAGEAL REFLUX DISEASE WITHOUT ESOPHAGITIS: ICD-10-CM

## 2024-03-07 DIAGNOSIS — R05.9 COUGH, UNSPECIFIED: ICD-10-CM

## 2024-03-07 DIAGNOSIS — E55.9 VITAMIN D DEFICIENCY, UNSPECIFIED: ICD-10-CM
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
## 2024-03-14 ENCOUNTER — APPOINTMENT (OUTPATIENT)
Dept: CT IMAGING | Facility: IMAGING CENTER | Age: 59
End: 2024-03-14
Payer: MEDICAID

## 2024-03-14 ENCOUNTER — OUTPATIENT (OUTPATIENT)
Dept: OUTPATIENT SERVICES | Facility: HOSPITAL | Age: 59
LOS: 1 days | End: 2024-03-14
Payer: MEDICAID
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
## 2024-03-14 PROCEDURE — 70450 CT HEAD/BRAIN W/O DYE: CPT

## 2024-03-14 PROCEDURE — 70450 CT HEAD/BRAIN W/O DYE: CPT | Mod: 26

## 2024-03-15 ENCOUNTER — APPOINTMENT (OUTPATIENT)
Age: 59
End: 2024-03-15
Payer: COMMERCIAL

## 2024-03-15 PROCEDURE — D0367: CPT

## 2024-04-12 ENCOUNTER — OUTPATIENT (OUTPATIENT)
Dept: OUTPATIENT SERVICES | Facility: HOSPITAL | Age: 59
LOS: 1 days | End: 2024-04-12

## 2024-04-12 ENCOUNTER — APPOINTMENT (OUTPATIENT)
Dept: INTERNAL MEDICINE | Facility: CLINIC | Age: 59
End: 2024-04-12
Payer: MEDICAID

## 2024-04-12 ENCOUNTER — NON-APPOINTMENT (OUTPATIENT)
Age: 59
End: 2024-04-12

## 2024-04-12 VITALS
WEIGHT: 187 LBS | HEIGHT: 67 IN | BODY MASS INDEX: 29.35 KG/M2 | SYSTOLIC BLOOD PRESSURE: 112 MMHG | OXYGEN SATURATION: 98 % | HEART RATE: 82 BPM | DIASTOLIC BLOOD PRESSURE: 74 MMHG

## 2024-04-12 DIAGNOSIS — R05.9 COUGH, UNSPECIFIED: ICD-10-CM

## 2024-04-12 DIAGNOSIS — Z00.00 ENCOUNTER FOR GENERAL ADULT MEDICAL EXAMINATION W/OUT ABNORMAL FINDINGS: ICD-10-CM

## 2024-04-12 PROBLEM — J30.2 SEASONAL ALLERGIES: Status: ACTIVE | Noted: 2018-05-04

## 2024-04-12 PROBLEM — R51.9 HEADACHE: Status: ACTIVE | Noted: 2024-03-04

## 2024-04-12 PROBLEM — K21.9 CHRONIC GERD: Status: ACTIVE | Noted: 2023-03-21

## 2024-04-12 PROBLEM — D72.10 EOSINOPHILIA: Status: ACTIVE | Noted: 2024-04-12

## 2024-04-12 PROBLEM — Z23 ENCOUNTER FOR IMMUNIZATION: Status: ACTIVE | Noted: 2020-12-03

## 2024-04-12 PROCEDURE — 99396 PREV VISIT EST AGE 40-64: CPT | Mod: GE

## 2024-04-12 RX ORDER — ATORVASTATIN CALCIUM 40 MG/1
40 TABLET, FILM COATED ORAL
Qty: 90 | Refills: 2 | Status: ACTIVE | COMMUNITY
Start: 2019-03-15 | End: 1900-01-01

## 2024-04-12 RX ORDER — LOSARTAN POTASSIUM 25 MG/1
25 TABLET, FILM COATED ORAL DAILY
Qty: 90 | Refills: 2 | Status: ACTIVE | COMMUNITY
Start: 2019-09-04 | End: 1900-01-01

## 2024-04-12 RX ORDER — METFORMIN HYDROCHLORIDE 1000 MG/1
1000 TABLET, COATED ORAL
Qty: 180 | Refills: 2 | Status: ACTIVE | COMMUNITY
Start: 2021-03-18 | End: 1900-01-01

## 2024-04-12 RX ORDER — FLUTICASONE PROPIONATE 50 UG/1
50 SPRAY, METERED NASAL TWICE DAILY
Qty: 16 | Refills: 2 | Status: ACTIVE | COMMUNITY
Start: 2019-09-04 | End: 1900-01-01

## 2024-04-12 RX ORDER — CELECOXIB 200 MG/1
200 CAPSULE ORAL TWICE DAILY
Qty: 60 | Refills: 1 | Status: DISCONTINUED | COMMUNITY
Start: 2023-05-23 | End: 2024-04-12

## 2024-04-12 NOTE — PLAN
[FreeTextEntry1] : 59 year old male with HTN, HLD, T2DM (recent Dx, A1C 6.5), sciatica (2/2 multilevel spondylosis), obesity, chronic cough, and seasonal allergies presenting for follow up visit for headache s/p fall and CPE.  #Headache s/p mechanical fall Occasional headache 1-2 times a week, varies on side and not affected by position. Neuro exam unremarkable -likely post concussive syndrome vs. tension headache vs. medication overuse headache -CT head non con 3/24 WNL -if sx persist, consider preventive tx with metoprolol or amitriptyline -c/w tylenol PRN for headache  #R digit paresthesia Occasional paresthesia of his R digits with elevation of his arm such as when driving which resolves within 5 minutes of bringing his arm back down -possible cervical radiculopathy vs. neurogenic thoracic outlet syndrome -monitor for now -consider PT referral if persists by next visit  #Seasonal allergies -c/w flonase two sprays bid -c/w claritin qhs PRN -if sx persist by next visit, consider switching claritin to fexofenadine and trialing azelastine nasal spray  #Cough Worse in morning, likely UACS vs. GERD. Saw pulm in 8/23, who attributed to UACS -c/w flonase bid -c/w pepcid 20 mg up to bid PRN, consider reintroducing PPI daily if cough persists  #Type 2 DM A1c 6.4 3/2024 -c/w metformin 1000 bid qd -urine microalbumin/cr ratio check today -foot exam: revisit -lipid profile/statin: WNL , c/w atorvastatin 40 mg qhs -B12 WNL 3/2024 -pneumovax: 11/2017, 4/2012 -eye exam: revisit  #Eosinophilia 1.6-> 1.21-> 0.86. Likely 2/2 atopy vs. parasite related  -trending down, reassuring -stool O&P neg 5/2023, repeat today (x3 required) -c/w season allergy management -consider h. pylori stool antigen w/ next O&P stool collection -strongyloides negative 4/2023, consider schistosomiases, filariasis, toxocariasis w/u -immunoglobulins WNL 4/2023 -tryptase 10.7 4/2023  #HTN /74 -c/w losartan 25 mg qd  #Back pain/sciatica -currently well managed w/ lidocaine patches, tylenol PRN, diclofenac gel and cyclobenzaprine qhs PRN -c/w current therapy  #HCM  Vaccines: -COVID: x2 2nd dose pfizer 3/28/21 -Flu: 12/9/22 -TDAP: 3/13/19 -PCV/PPSV: 11/9/17, 4/2/12 -Shingrix: revisit  Screening: -Colonoscopy: done 2017, due 2027 -Lipids, A1c: 3/2024 WNL, A1c 6.4 3/2024 -PSA: WNL 3/2024  Nunu Gliagias, PGY-2 Case Discussed with Dr. Blue [facility]  RTC in 5 weeks for telephonic visit to discuss headaches, seasonal allergies. If resolving, f/u in 6 months w/ me

## 2024-04-12 NOTE — HISTORY OF PRESENT ILLNESS
[FreeTextEntry1] : Headache s/p fall and CPE  [de-identified] : 59 year old male with HTN, HLD, T2DM (recent Dx, A1C 6.5), sciatica (2/2 multilevel spondylosis), obesity, chronic cough, and seasonal allergies presenting for follow up visit for headache s/p fall and CPE.  #Headache s/p mechanical fall: Pt had fall last month in which he hit the back of his head. The CT head noncon was normal. Presently, pt stated that he still gets the headaches 1-2 times a week and they are usually either on one side of his head or both sides. They can come on at any time during the day and not affected by movement or position change. Pt stated that they are usually relieved by 2 tylenols. He denied any imbalance or lightheadedness or headache which persists despite tylenol.  #R digit paresthesia: Pt complained of occasional paresthesia of his R digits with elevation of his arm such as when driving which resolves within 5 minutes of bringing his arm back down. He denied any pain of his shoulder. Endorsed some discomfort of R side of neck. Denied zulma numbness, swelling or pain of upper extremity.  #Seasonal allergies: Pt stated that he has been using the flonase twice a day, two sprays. He stated that he still has congestion once in a while. He has not been using claritin.  #Cough: Pt stated that his cough still comes and goes, usually worse in the morning and he has to blow his nose to get out mucus. Denied unintended weight loss or night sweats.  #Type 2 DM: Pt has been taking his metformin 1000 mg bid everyday.

## 2024-04-12 NOTE — HISTORY OF PRESENT ILLNESS
[FreeTextEntry1] : headache s/p fall, CPE [de-identified] : 59 year old male with HTN, HLD, T2DM (recent Dx, A1C 6.5), sciatica (2/2 multilevel spondylosis), obesity, chronic cough, and seasonal allergies presenting for follow up visit for headache s/p fall and CPE.  #Headache s/p mechanical fall: Pt had fall last month in which he hit the back of his head. The CT head noncon was normal. Presently, pt stated that he still gets the headaches 1-2 times a week and they are usually either on one side of his head or both sides. They can come on at any time during the day and not affected by movement or position change. Pt stated that they are usually relieved by 2 tylenols. He denied any imbalance or lightheadedness or headache which persists despite tylenol.  #R digit paresthesia: Pt complained of occasional paresthesia of his R digits with elevation of his arm such as when driving which resolves within 5 minutes of bringing his arm back down. He denied any pain of his shoulder. Endorsed some discomfort of R side of neck. Denied zulma numbness, swelling or pain of upper extremity.  #Seasonal allergies: Pt stated that he has been using the flonase twice a day, two sprays. He stated that he still has congestion once in a while. He has not been using claritin.  #Cough: Pt stated that his cough still comes and goes, usually worse in the morning and he has to blow his nose to get out mucus. Denied unintended weight loss or night sweats.  #Type 2 DM: Pt has been taking his metformin 1000 mg bid everyday.

## 2024-04-12 NOTE — PLAN
[FreeTextEntry1] : 59 year old male with HTN, HLD, T2DM (recent Dx, A1C 6.5), sciatica (2/2 multilevel spondylosis), obesity, chronic cough, and seasonal allergies presenting for follow up visit for headache s/p fall and CPE.  #Headache s/p mechanical fall Occasional headache 1-2 times a week, may involve any area of head. Likely post concussive syndrome vs. tension headaches vs. medication overuse (although pt stated he is not use abortive tx often) -CT head non con 3/14/24 WNL -consider preventive tx such as metoprolol or amitriptyline next visit if headaches do not resolve -c/w tylenol PRN for headache  #R digit paresthesia Occasional paresthesia of his R digits with elevation of his arm such as when driving which resolves within 5 minutes of bringing his arm back down. Ddx: cervical radiculopathy vs. neurogenic thoracic outlet syndrome -continue to monitor for now as symptoms resolve quickly  -consider PT referral if sx persist next visit  #Seasonal allergies -c/w flonase bid two sprays -c/w claritin PRN qhs -if sx worsen next visit, consider changing claritin to fexofenadine or adding azelastine nasal spray  #Cough Worse in morning, associated with mucus. Ddx: UACS vs. GERD.  -c/w management of seasonal allergies as above -c/w pepcid 20 mg up to bid PRN -continue to monitor -recommended patient to take note of triggers  #Type 2 DM: Pt has been taking his metformin 1000 mg bid everyday.

## 2024-04-12 NOTE — END OF VISIT
[FreeTextEntry3] : 59 year old male with HTN, HLD, T2DM (recent Dx, A1C 6.5), sciatica (2/2 multilevel spondylosis), obesity, chronic cough, and seasonal allergies presenting for follow up visit for headache s/p fall and CPE.  Headache: bilateral and/or unilateral, intermittent, improved with tylenol. no aura, non-positional, is not worse in AM. No n/v.  Likely tension headaches. Continue tylenol PRN for now. Completed CTH post fall, no FNDs. Will consider preventative medication such as TCAs if headaches not improved.   Eosinophilia: likely iso allergies/mast cell release given tryptase elevation. Will get repeat stool O&P given need 3 to be negative.  Had CT chest done and followed up with neuro   Preventative medicine: obtain urine albumin/Cr ratio today   RTC in 5 weeks to follow up on HA

## 2024-04-15 ENCOUNTER — APPOINTMENT (OUTPATIENT)
Dept: PHYSICAL MEDICINE AND REHAB | Facility: CLINIC | Age: 59
End: 2024-04-15

## 2024-04-15 DIAGNOSIS — E11.9 TYPE 2 DIABETES MELLITUS W/OUT COMPLICATIONS: ICD-10-CM

## 2024-04-15 DIAGNOSIS — K21.9 GASTRO-ESOPHAGEAL REFLUX DISEASE W/OUT ESOPHAGITIS: ICD-10-CM

## 2024-04-15 DIAGNOSIS — I10 ESSENTIAL (PRIMARY) HYPERTENSION: ICD-10-CM

## 2024-04-15 DIAGNOSIS — Z23 ENCOUNTER FOR IMMUNIZATION: ICD-10-CM

## 2024-04-15 DIAGNOSIS — J30.2 OTHER SEASONAL ALLERGIC RHINITIS: ICD-10-CM

## 2024-04-15 DIAGNOSIS — E78.5 HYPERLIPIDEMIA, UNSPECIFIED: ICD-10-CM

## 2024-04-15 DIAGNOSIS — R51.9 HEADACHE, UNSPECIFIED: ICD-10-CM

## 2024-04-15 DIAGNOSIS — D72.10 EOSINOPHILIA, UNSPECIFIED: ICD-10-CM

## 2024-04-15 LAB
CREAT SPEC-SCNC: 165 MG/DL
MICROALBUMIN 24H UR DL<=1MG/L-MCNC: 7.2 MG/DL
MICROALBUMIN/CREAT 24H UR-RTO: 43 MG/G

## 2024-04-16 DIAGNOSIS — I10 ESSENTIAL (PRIMARY) HYPERTENSION: ICD-10-CM

## 2024-04-16 DIAGNOSIS — R05.9 COUGH, UNSPECIFIED: ICD-10-CM

## 2024-04-16 DIAGNOSIS — K21.9 GASTRO-ESOPHAGEAL REFLUX DISEASE WITHOUT ESOPHAGITIS: ICD-10-CM

## 2024-04-16 DIAGNOSIS — E11.9 TYPE 2 DIABETES MELLITUS WITHOUT COMPLICATIONS: ICD-10-CM

## 2024-04-16 DIAGNOSIS — J30.2 OTHER SEASONAL ALLERGIC RHINITIS: ICD-10-CM

## 2024-04-16 DIAGNOSIS — D72.10 EOSINOPHILIA, UNSPECIFIED: ICD-10-CM

## 2024-04-16 DIAGNOSIS — E78.5 HYPERLIPIDEMIA, UNSPECIFIED: ICD-10-CM

## 2024-04-16 DIAGNOSIS — R51.9 HEADACHE, UNSPECIFIED: ICD-10-CM

## 2024-04-16 DIAGNOSIS — Z00.00 ENCOUNTER FOR GENERAL ADULT MEDICAL EXAMINATION WITHOUT ABNORMAL FINDINGS: ICD-10-CM

## 2024-04-17 ENCOUNTER — APPOINTMENT (OUTPATIENT)
Dept: PHYSICAL MEDICINE AND REHAB | Facility: CLINIC | Age: 59
End: 2024-04-17
Payer: MEDICAID

## 2024-04-17 DIAGNOSIS — M51.26 OTHER INTERVERTEBRAL DISC DISPLACEMENT, LUMBAR REGION: ICD-10-CM

## 2024-04-17 PROCEDURE — 99213 OFFICE O/P EST LOW 20 MIN: CPT

## 2024-04-17 RX ORDER — CELECOXIB 200 MG/1
200 CAPSULE ORAL TWICE DAILY
Qty: 60 | Refills: 1 | Status: ACTIVE | COMMUNITY
Start: 2024-04-17 | End: 1900-01-01

## 2024-04-17 NOTE — ASSESSMENT
[FreeTextEntry1] : Mr. MARCUS RUELAS is a 59 year-old M with numbness and pain in both legs left > right due to radiculopathy and spondylosis. He reports chronic long standing pain with periodic exacerbations. There are no myelopathic signs on today's exam.  Patient reassured and educated on the diagnosis and treatment options. Risks and benefits of treatment and of delaying treatment discussed with patient. Risks discussed include but not limited to: progression of symptoms, worsening pain and functional status, etc.  This note was generated using Dragon medical dictation software. A reasonable effort had been made for proofreading its contents, but spelling mistakes or grammatical errors may still remain. If there are any questions or points of clarification needed please notify my office.  Continue Celebrex 200 mg PO daily x 30 days PRN pain with food #30. Denies CKD, CAD, or gastritis. Recommend that if patient develops GI symptoms including abdominal pain, nausea, or vomiting to discontinue use of medication immediately.  Sending patient for PT for BACK PAIN to help relieve pain and improve function. Stretching, strengthening, ROM, home education and other appropriate interventions. Precautions include fall prevention.  Return for follow up in 1 month If continues to have pain will start with TP injections Consider block with ULT guidance for meralgia parasthetica If no relief of symptoms and no surgery will offer spinal injection; SIJ vs TFESI   Patient was advised if the following symptoms develop: chills, fever, loss of bladder control, bowel incontinence or urinary retention, numbness/tingling or weakness is present in upper or lower extremities, to go to the nearest emergency room. This may be a new clinical condition not present at the time of the patient visit that may lead to paralysis and/or death. Patient advised if the above symptoms developed to also call the office immediately to inform us and to go to the nearest emergency room.

## 2024-04-17 NOTE — PHYSICAL EXAM
[FreeTextEntry1] : General exam   Constitutional: The patient appears well-developed, well-nourished, and in no apparent distress. Patient is well-groomed.    Skin: The skin is warm and dry, with normal turgor.  Eyes: PERRL.    ENMT: Ears: Hearing is grossly within normal limits.    Neck: Supple: The neck is supple.    Respiratory: Inspection: Breathing unlabored.    Neurologic: Alert and oriented x 3.   Psychiatric: Patient is cooperative and appropriate.  Mood and affect are normal.  Patient's insight is good, and memory and judgment are intact.  Lumbar Skin c/d/i without any erythema, swelling, effusion  Diffuse tenderness Multiple trigger points Flexed forward posture with hamstring tightness SLR + b/l

## 2024-04-17 NOTE — HISTORY OF PRESENT ILLNESS
[FreeTextEntry1] : MARCUS RUELAS is here for follow up. Since last visit he has been taking Celebrex with partial relief of his pain. I sent him for PT but MARCUS RUELAS notes that he had difficulty scheduling it. He continues report back pains and numbness/tingling in both outer thighs. Pain is over 5/10 on NRS.   Denies any new pain, numbness or weakness, bowel/bladder dysfunction, saddle anesthesia, fevers, chills, weight loss, night pain, or night sweats at this time.

## 2024-05-13 ENCOUNTER — APPOINTMENT (OUTPATIENT)
Dept: INTERNAL MEDICINE | Facility: CLINIC | Age: 59
End: 2024-05-13

## 2024-05-15 ENCOUNTER — APPOINTMENT (OUTPATIENT)
Dept: PHYSICAL MEDICINE AND REHAB | Facility: CLINIC | Age: 59
End: 2024-05-15
Payer: MEDICAID

## 2024-05-15 DIAGNOSIS — M79.18 MYALGIA, OTHER SITE: ICD-10-CM

## 2024-05-15 DIAGNOSIS — M54.16 RADICULOPATHY, LUMBAR REGION: ICD-10-CM

## 2024-05-15 DIAGNOSIS — G89.29 MYALGIA, OTHER SITE: ICD-10-CM

## 2024-05-15 DIAGNOSIS — G57.12 MERALGIA PARESTHETICA, LEFT LOWER LIMB: ICD-10-CM

## 2024-05-15 PROCEDURE — 99213 OFFICE O/P EST LOW 20 MIN: CPT

## 2024-05-15 NOTE — ASSESSMENT
[FreeTextEntry1] : Mr. MARCUS RUELAS is a 59 year-old M with numbness and pain in both legs left > right due to radiculopathy and spondylosis. He reports chronic long standing pain with periodic exacerbations.  Patient reassured and educated on the diagnosis and treatment options. Risks and benefits of treatment and of delaying treatment discussed with patient. Risks discussed include but not limited to: progression of symptoms, worsening pain and functional status, etc.  This note was generated using Dragon medical dictation software. A reasonable effort had been made for proofreading its contents, but spelling mistakes or grammatical errors may still remain. If there are any questions or points of clarification needed please notify my office.  Continue Celebrex 200 mg PO daily x 30 days PRN pain with food #30. Denies CKD, CAD, or gastritis. Recommend that if patient develops GI symptoms including abdominal pain, nausea, or vomiting to discontinue use of medication immediately.  Will be starting in June PT for BACK PAIN to help relieve pain and improve function. Stretching, strengthening, ROM, home education and other appropriate interventions. Precautions include fall prevention.  Return for follow up in 2 month If continues to have pain will start with TP injections Consider block with ULT guidance for meralgia parasthetica If no relief of symptoms and no surgery will offer spinal injection; SIJ vs TFESI   Patient was advised if the following symptoms develop: chills, fever, loss of bladder control, bowel incontinence or urinary retention, numbness/tingling or weakness is present in upper or lower extremities, to go to the nearest emergency room. This may be a new clinical condition not present at the time of the patient visit that may lead to paralysis and/or death. Patient advised if the above symptoms developed to also call the office immediately to inform us and to go to the nearest emergency room.

## 2024-05-15 NOTE — HISTORY OF PRESENT ILLNESS
[FreeTextEntry1] : MARCUS RUELAS is here for follow up. Since last visit he has been taking Celebrex with partial relief of his pain. I sent him for PT but MARCUS RUELAS notes that he will be starting June due to his classes and work. He continues report back pains and numbness/tingling in both outer thighs. Pain is over 5/10 on NRS.   Denies any new pain, numbness or weakness, bowel/bladder dysfunction, saddle anesthesia, fevers, chills, weight loss, night pain, or night sweats at this time.

## 2024-06-27 ENCOUNTER — APPOINTMENT (OUTPATIENT)
Dept: OPHTHALMOLOGY | Facility: CLINIC | Age: 59
End: 2024-06-27
Payer: MEDICAID

## 2024-06-27 ENCOUNTER — NON-APPOINTMENT (OUTPATIENT)
Age: 59
End: 2024-06-27

## 2024-06-27 PROCEDURE — 92012 INTRM OPH EXAM EST PATIENT: CPT

## 2024-06-27 PROCEDURE — 92133 CPTRZD OPH DX IMG PST SGM ON: CPT

## 2024-07-29 ENCOUNTER — APPOINTMENT (OUTPATIENT)
Dept: PHYSICAL MEDICINE AND REHAB | Facility: CLINIC | Age: 59
End: 2024-07-29

## 2024-07-30 ENCOUNTER — APPOINTMENT (OUTPATIENT)
Age: 59
End: 2024-07-30
Payer: COMMERCIAL

## 2024-07-30 PROCEDURE — D0140: CPT

## 2024-08-08 ENCOUNTER — RX RENEWAL (OUTPATIENT)
Age: 59
End: 2024-08-08

## 2024-08-23 ENCOUNTER — APPOINTMENT (OUTPATIENT)
Age: 59
End: 2024-08-23

## 2024-08-23 PROCEDURE — SCREENING: CUSTOM

## 2024-08-29 ENCOUNTER — NON-APPOINTMENT (OUTPATIENT)
Age: 59
End: 2024-08-29

## 2024-09-03 ENCOUNTER — NON-APPOINTMENT (OUTPATIENT)
Age: 59
End: 2024-09-03

## 2024-09-11 ENCOUNTER — APPOINTMENT (OUTPATIENT)
Dept: PHYSICAL MEDICINE AND REHAB | Facility: CLINIC | Age: 59
End: 2024-09-11
Payer: MEDICAID

## 2024-09-11 DIAGNOSIS — G57.12 MERALGIA PARESTHETICA, LEFT LOWER LIMB: ICD-10-CM

## 2024-09-11 DIAGNOSIS — M54.16 RADICULOPATHY, LUMBAR REGION: ICD-10-CM

## 2024-09-11 DIAGNOSIS — M79.18 MYALGIA, OTHER SITE: ICD-10-CM

## 2024-09-11 DIAGNOSIS — G89.29 MYALGIA, OTHER SITE: ICD-10-CM

## 2024-09-11 DIAGNOSIS — M48.061 SPINAL STENOSIS, LUMBAR REGION WITHOUT NEUROGENIC CLAUDICATION: ICD-10-CM

## 2024-09-11 DIAGNOSIS — M53.3 SACROCOCCYGEAL DISORDERS, NOT ELSEWHERE CLASSIFIED: ICD-10-CM

## 2024-09-11 PROCEDURE — 99213 OFFICE O/P EST LOW 20 MIN: CPT

## 2024-09-11 NOTE — ASSESSMENT
[FreeTextEntry1] : Mr. MARCUS RUELAS is a 59 year-old M with numbness and pain in both legs left > right due to radiculopathy and spondylosis. He reports chronic long standing pain with periodic exacerbations.  Patient reassured and educated on the diagnosis and treatment options. Risks and benefits of treatment and of delaying treatment discussed with patient. Risks discussed include but not limited to: progression of symptoms, worsening pain and functional status, etc.  This note was generated using Dragon medical dictation software. A reasonable effort had been made for proofreading its contents, but spelling mistakes or grammatical errors may still remain. If there are any questions or points of clarification needed please notify my office.  Continue Celebrex 200 mg PO daily x 30 days PRN pain with food #30. Denies CKD, CAD, or gastritis. Recommend that if patient develops GI symptoms including abdominal pain, nausea, or vomiting to discontinue use of medication immediately.  Reordering PT for BACK PAIN to help relieve pain and improve function. Stretching, strengthening, ROM, home education and other appropriate interventions. Precautions include fall prevention.  Return for follow up in 1-2 month If continues to have pain will start with TP injections Consider block with ULT guidance for meralgia parasthetica If no relief of symptoms and no surgery will offer spinal injection; SIJ vs TFESI Consider lumbar MRI  Patient was advised if the following symptoms develop: chills, fever, loss of bladder control, bowel incontinence or urinary retention, numbness/tingling or weakness is present in upper or lower extremities, to go to the nearest emergency room. This may be a new clinical condition not present at the time of the patient visit that may lead to paralysis and/or death. Patient advised if the above symptoms developed to also call the office immediately to inform us and to go to the nearest emergency room.

## 2024-09-11 NOTE — HISTORY OF PRESENT ILLNESS
[FreeTextEntry1] : MARCUS RUELAS is here for follow up. Since last visit he reports not being able to start PT still. He notes that he did not have the script, then he didn't have the approval. Pain is the same today, across the lower back. It is also associated with tingling along both thighs.   Denies any new pain, numbness or weakness, bowel/bladder dysfunction, saddle anesthesia, fevers, chills, weight loss, night pain, or night sweats at this time.

## 2024-09-11 NOTE — PHYSICAL EXAM
[FreeTextEntry1] : General exam   Constitutional: The patient appears well-developed, well-nourished, and in no apparent distress. Patient is well-groomed.    Skin: The skin is warm and dry, with normal turgor.  Eyes: PERRL.    ENMT: Ears: Hearing is grossly within normal limits.    Neck: Supple: The neck is supple.    Respiratory: Inspection: Breathing unlabored.    Neurologic: Alert and oriented x 3.   Psychiatric: Patient is cooperative and appropriate.  Mood and affect are normal.  Patient's insight is good, and memory and judgment are intact.  Lumbar Skin c/d/i without any erythema, swelling, effusion  Diffuse tenderness Multiple trigger points Hamstring tightness Flexed forward postures Decreased lumbar lordosis with truncal obesity Functional gait

## 2024-09-19 ENCOUNTER — APPOINTMENT (OUTPATIENT)
Age: 59
End: 2024-09-19
Payer: COMMERCIAL

## 2024-09-19 PROCEDURE — 99024 POSTOP FOLLOW-UP VISIT: CPT

## 2024-09-26 ENCOUNTER — APPOINTMENT (OUTPATIENT)
Age: 59
End: 2024-09-26
Payer: COMMERCIAL

## 2024-09-26 PROCEDURE — SCREENING: CUSTOM

## 2024-09-30 ENCOUNTER — OUTPATIENT (OUTPATIENT)
Dept: OUTPATIENT SERVICES | Facility: HOSPITAL | Age: 59
LOS: 1 days | End: 2024-09-30

## 2024-09-30 ENCOUNTER — APPOINTMENT (OUTPATIENT)
Dept: INTERNAL MEDICINE | Facility: CLINIC | Age: 59
End: 2024-09-30
Payer: MEDICAID

## 2024-09-30 VITALS
HEART RATE: 66 BPM | WEIGHT: 182 LBS | BODY MASS INDEX: 28.56 KG/M2 | OXYGEN SATURATION: 100 % | SYSTOLIC BLOOD PRESSURE: 106 MMHG | DIASTOLIC BLOOD PRESSURE: 65 MMHG | HEIGHT: 67 IN

## 2024-09-30 DIAGNOSIS — R51.9 HEADACHE, UNSPECIFIED: ICD-10-CM

## 2024-09-30 DIAGNOSIS — E78.5 HYPERLIPIDEMIA, UNSPECIFIED: ICD-10-CM

## 2024-09-30 DIAGNOSIS — R05.9 COUGH, UNSPECIFIED: ICD-10-CM

## 2024-09-30 DIAGNOSIS — D72.10 EOSINOPHILIA, UNSPECIFIED: ICD-10-CM

## 2024-09-30 DIAGNOSIS — I10 ESSENTIAL (PRIMARY) HYPERTENSION: ICD-10-CM

## 2024-09-30 DIAGNOSIS — Z00.00 ENCOUNTER FOR GENERAL ADULT MEDICAL EXAMINATION W/OUT ABNORMAL FINDINGS: ICD-10-CM

## 2024-09-30 DIAGNOSIS — E11.9 TYPE 2 DIABETES MELLITUS W/OUT COMPLICATIONS: ICD-10-CM

## 2024-09-30 DIAGNOSIS — M54.32 SCIATICA, RIGHT SIDE: ICD-10-CM

## 2024-09-30 DIAGNOSIS — J30.2 OTHER SEASONAL ALLERGIC RHINITIS: ICD-10-CM

## 2024-09-30 DIAGNOSIS — Z23 ENCOUNTER FOR IMMUNIZATION: ICD-10-CM

## 2024-09-30 DIAGNOSIS — M54.31 SCIATICA, RIGHT SIDE: ICD-10-CM

## 2024-09-30 PROCEDURE — G2211 COMPLEX E/M VISIT ADD ON: CPT | Mod: NC

## 2024-09-30 PROCEDURE — 99213 OFFICE O/P EST LOW 20 MIN: CPT

## 2024-10-02 NOTE — HISTORY OF PRESENT ILLNESS
[FreeTextEntry1] : F/U [de-identified] : 59 year old male with HTN, HLD, T2DM (recent Dx, A1C 6.5), sciatica (2/2 multilevel spondylosis), obesity, chronic cough, and seasonal allergies presenting for follow up visit   #Headache (hx of mechanical fall 3/2024): Pt complained of one sided headaches that can be on either side of his head, first started 3 weeks ago, usually relieved by tylenol. It feels different from the headaches he initially got after falling and hitting his head. He is not sure if he is drinking too much water during the day. And usually gets 6-7 hours of sleep. Pt has not identified any triggers to the headache and is not sure if he has been stressed lately. Denied imbalance, numbness or loss of strength.  #Back pain/sciatica: 4-5/10 back pain. Pt is inquiring about getting an rx for an MRI. His pain has been controlled with PT so far and he follows w/ PM&R for the sciatica. Denied saddle anesthesia or incontinence.  #Cough: Pt stated his cough has improved greatly since using flonase consistently. Usually when the seasons change, his cough gets worse. He uses claritin as needed.  Pt is interested in getting the flu and Tdap shots. Pt also inquired about whether he needs carotid ultrasound to screen for stroke risk.

## 2024-10-02 NOTE — HISTORY OF PRESENT ILLNESS
[FreeTextEntry1] : F/U [de-identified] : 59 year old male with HTN, HLD, T2DM (recent Dx, A1C 6.5), sciatica (2/2 multilevel spondylosis), obesity, chronic cough, and seasonal allergies presenting for follow up visit   #Headache (hx of mechanical fall 3/2024): Pt complained of one sided headaches that can be on either side of his head, first started 3 weeks ago, usually relieved by tylenol. It feels different from the headaches he initially got after falling and hitting his head. He is not sure if he is drinking too much water during the day. And usually gets 6-7 hours of sleep. Pt has not identified any triggers to the headache and is not sure if he has been stressed lately. Denied imbalance, numbness or loss of strength.  #Back pain/sciatica: 4-5/10 back pain. Pt is inquiring about getting an rx for an MRI. His pain has been controlled with PT so far and he follows w/ PM&R for the sciatica. Denied saddle anesthesia or incontinence.  #Cough: Pt stated his cough has improved greatly since using flonase consistently. Usually when the seasons change, his cough gets worse. He uses claritin as needed.  Pt is interested in getting the flu and Tdap shots. Pt also inquired about whether he needs carotid ultrasound to screen for stroke risk.

## 2024-10-02 NOTE — PHYSICAL EXAM
[Normal] : no joint swelling and grossly normal strength and tone [de-identified] : Mild paraspinal tenderness in lumbar and thoracic area

## 2024-10-02 NOTE — PHYSICAL EXAM
[Normal] : no joint swelling and grossly normal strength and tone [de-identified] : Mild paraspinal tenderness in lumbar and thoracic area

## 2024-10-02 NOTE — PLAN
[FreeTextEntry1] : 59 year old male with HTN, HLD, T2DM (recent Dx, A1C 6.5), sciatica (2/2 multilevel spondylosis), obesity, chronic cough, and seasonal allergies presenting for follow up visit   #Headache (hx of mechanical fall 3/2024) Likely unrelated to post concussive syndrome, as different from past headache- seems more like migraine (one sided, relieved immediately by tylenol) -CT head non con 3/24 WNL -encouraged pt to keep log of triggers to headache -encouraged to drink 8 glasses of water daily, get at least 8 hours of sleep, not miss breakfast -may take tylenol as needed  #Back pain/sciatica 4-5/10 back pain -c/w PT  -advised pt that he does not have warning signs that would warrant MRI at this time, unless PM&R specialist recommends for procedural purpose (pt is considering spinal injections) -c/w celecoxib, diclofenac gel, and lidocaine patches as needed  #Cough Likely UACS, improved greatly per patient -renewed flonase 1-2 times daily -renewed claritin PRN -keep bedsheets and pillowcases clean, consider humidifier in room -renewed pepcid PRN   #Type 2 DM A1c 6.4 3/2024 -recheck A1c next visit -c/w metformin 1000 bid qd -urine microalbumin/cr ratio stable at 43 (pt w/ well controlled BP, already on ARB) -consider jardiance in place of metformin for renal protection in future visit if proteinuria worsens -foot exam: revisit -lipid profile/statin: WNL , c/w atorvastatin 40 mg qhs -B12 WNL 3/2024 -pneumovax: 11/2017, 4/2012 -eye exam: revisit  #Eosinophilia 1.6-> 1.21-> 0.86. Likely 2/2 atopy vs. parasite related -trending down, reassuring -repeat CBC next visit -stool O&P neg 5/2023, neg 4/2024, repeat today (x3 required) -c/w season allergy management -consider h. pylori stool antigen   -strongyloides negative 4/2023, consider schistosomiases, filariasis, toxocariasis w/u -immunoglobulins WNL 4/2023 -tryptase 10.7 4/2023  HTN /65 -c/w losartan 25 qd  #HLD -c/w atorvastatin 40 mg qd -last lipid panel was well controlled -reassured pt that his cholesterol and DM2 are well controlled and these are good measures to prevent stroke -encouraged to get more walking and activity in his day  #HCM  Vaccines: -COVID: x2 2nd dose pfizer 3/28/21, encouraged to get booster -Flu: today -TDAP: today -PCV/PPSV: 11/9/17, 4/2/12 -Shingrix: revisit  Screening: -Colonoscopy: done 2017, due 2027 -Lipids, A1c: 3/2024 WNL, A1c 6.4 3/2024 -PSA: WNL 3/2024  Nunu Gliagias, PGY-3 Case Discussed with Dr. [facility]  RTC in 3 months for A1c check, Alb/cr, blood work (f/u eosinophilia), and headache (pt prefers to f/u sooner)

## 2024-10-03 ENCOUNTER — APPOINTMENT (OUTPATIENT)
Age: 59
End: 2024-10-03
Payer: COMMERCIAL

## 2024-10-03 PROCEDURE — SCREENING: CUSTOM

## 2024-10-04 DIAGNOSIS — J30.2 OTHER SEASONAL ALLERGIC RHINITIS: ICD-10-CM

## 2024-10-04 DIAGNOSIS — M54.31 SCIATICA, RIGHT SIDE: ICD-10-CM

## 2024-10-04 DIAGNOSIS — D72.10 EOSINOPHILIA, UNSPECIFIED: ICD-10-CM

## 2024-10-04 DIAGNOSIS — E11.9 TYPE 2 DIABETES MELLITUS WITHOUT COMPLICATIONS: ICD-10-CM

## 2024-10-04 DIAGNOSIS — E78.5 HYPERLIPIDEMIA, UNSPECIFIED: ICD-10-CM

## 2024-10-04 DIAGNOSIS — I10 ESSENTIAL (PRIMARY) HYPERTENSION: ICD-10-CM

## 2024-10-04 DIAGNOSIS — R05.9 COUGH, UNSPECIFIED: ICD-10-CM

## 2024-10-04 DIAGNOSIS — R51.9 HEADACHE, UNSPECIFIED: ICD-10-CM

## 2024-10-20 NOTE — ED PROVIDER NOTE - CONSTITUTIONAL MENTATION
[Normal Appearance] : normal appearance [Well Groomed] : well groomed [General Appearance - In No Acute Distress] : no acute distress [Edema] : no peripheral edema [Respiration, Rhythm And Depth] : normal respiratory rhythm and effort [Exaggerated Use Of Accessory Muscles For Inspiration] : no accessory muscle use [Abdomen Soft] : soft [Costovertebral Angle Tenderness] : no ~M costovertebral angle tenderness [Abdomen Tenderness] : non-tender [Urethral Meatus] : normal urethra [Urinary Bladder Findings] : the bladder was normal on palpation [Normal Station and Gait] : the gait and station were normal for the patient's age [External Female Genitalia] : normal external genitalia [] : no rash [No Focal Deficits] : no focal deficits [Oriented To Time, Place, And Person] : oriented to person, place, and time alert/awake/oriented to person, place, time/situation [Affect] : the affect was normal [Mood] : the mood was normal [No Palpable Adenopathy] : no palpable adenopathy

## 2024-11-11 ENCOUNTER — APPOINTMENT (OUTPATIENT)
Dept: PHYSICAL MEDICINE AND REHAB | Facility: CLINIC | Age: 59
End: 2024-11-11
Payer: MEDICAID

## 2024-11-11 DIAGNOSIS — M54.16 RADICULOPATHY, LUMBAR REGION: ICD-10-CM

## 2024-11-11 DIAGNOSIS — M47.817 SPONDYLOSIS W/OUT MYELOPATHY OR RADICULOPATHY, LUMBOSACRAL REGION: ICD-10-CM

## 2024-11-11 DIAGNOSIS — G57.12 MERALGIA PARESTHETICA, LEFT LOWER LIMB: ICD-10-CM

## 2024-11-11 PROCEDURE — 99213 OFFICE O/P EST LOW 20 MIN: CPT

## 2024-12-06 ENCOUNTER — OUTPATIENT (OUTPATIENT)
Dept: OUTPATIENT SERVICES | Facility: HOSPITAL | Age: 59
LOS: 1 days | End: 2024-12-06
Payer: MEDICAID

## 2024-12-06 ENCOUNTER — APPOINTMENT (OUTPATIENT)
Dept: MRI IMAGING | Facility: IMAGING CENTER | Age: 59
End: 2024-12-06
Payer: MEDICAID

## 2024-12-06 DIAGNOSIS — Z00.8 ENCOUNTER FOR OTHER GENERAL EXAMINATION: ICD-10-CM

## 2024-12-06 DIAGNOSIS — M54.16 RADICULOPATHY, LUMBAR REGION: ICD-10-CM

## 2024-12-06 PROCEDURE — 72148 MRI LUMBAR SPINE W/O DYE: CPT | Mod: 26

## 2024-12-06 PROCEDURE — 72148 MRI LUMBAR SPINE W/O DYE: CPT

## 2024-12-12 ENCOUNTER — APPOINTMENT (OUTPATIENT)
Dept: OPHTHALMOLOGY | Facility: CLINIC | Age: 59
End: 2024-12-12
Payer: MEDICAID

## 2024-12-12 ENCOUNTER — NON-APPOINTMENT (OUTPATIENT)
Age: 59
End: 2024-12-12

## 2024-12-12 PROCEDURE — 92083 EXTENDED VISUAL FIELD XM: CPT

## 2024-12-12 PROCEDURE — 92012 INTRM OPH EXAM EST PATIENT: CPT

## 2024-12-23 ENCOUNTER — APPOINTMENT (OUTPATIENT)
Dept: PHYSICAL MEDICINE AND REHAB | Facility: CLINIC | Age: 59
End: 2024-12-23
Payer: MEDICAID

## 2024-12-23 DIAGNOSIS — M48.062 SPINAL STENOSIS, LUMBAR REGION WITH NEUROGENIC CLAUDICATION: ICD-10-CM

## 2024-12-23 DIAGNOSIS — G57.12 MERALGIA PARESTHETICA, LEFT LOWER LIMB: ICD-10-CM

## 2024-12-23 DIAGNOSIS — M51.26 OTHER INTERVERTEBRAL DISC DISPLACEMENT, LUMBAR REGION: ICD-10-CM

## 2024-12-23 PROCEDURE — 99213 OFFICE O/P EST LOW 20 MIN: CPT

## 2025-01-16 ENCOUNTER — APPOINTMENT (OUTPATIENT)
Dept: INTERNAL MEDICINE | Facility: CLINIC | Age: 60
End: 2025-01-16
Payer: MEDICAID

## 2025-01-16 ENCOUNTER — OUTPATIENT (OUTPATIENT)
Dept: OUTPATIENT SERVICES | Facility: HOSPITAL | Age: 60
LOS: 1 days | End: 2025-01-16

## 2025-01-16 VITALS
HEART RATE: 84 BPM | RESPIRATION RATE: 16 BRPM | HEIGHT: 67 IN | WEIGHT: 187 LBS | OXYGEN SATURATION: 99 % | SYSTOLIC BLOOD PRESSURE: 118 MMHG | DIASTOLIC BLOOD PRESSURE: 66 MMHG | BODY MASS INDEX: 29.35 KG/M2

## 2025-01-16 DIAGNOSIS — E11.9 TYPE 2 DIABETES MELLITUS W/OUT COMPLICATIONS: ICD-10-CM

## 2025-01-16 DIAGNOSIS — M54.32 SCIATICA, RIGHT SIDE: ICD-10-CM

## 2025-01-16 DIAGNOSIS — R51.9 HEADACHE, UNSPECIFIED: ICD-10-CM

## 2025-01-16 DIAGNOSIS — R05.9 COUGH, UNSPECIFIED: ICD-10-CM

## 2025-01-16 DIAGNOSIS — J30.2 OTHER SEASONAL ALLERGIC RHINITIS: ICD-10-CM

## 2025-01-16 DIAGNOSIS — D72.10 EOSINOPHILIA, UNSPECIFIED: ICD-10-CM

## 2025-01-16 DIAGNOSIS — I10 ESSENTIAL (PRIMARY) HYPERTENSION: ICD-10-CM

## 2025-01-16 DIAGNOSIS — Z00.00 ENCOUNTER FOR GENERAL ADULT MEDICAL EXAMINATION W/OUT ABNORMAL FINDINGS: ICD-10-CM

## 2025-01-16 DIAGNOSIS — M54.31 SCIATICA, RIGHT SIDE: ICD-10-CM

## 2025-01-16 DIAGNOSIS — Z23 ENCOUNTER FOR IMMUNIZATION: ICD-10-CM

## 2025-01-16 DIAGNOSIS — E78.5 HYPERLIPIDEMIA, UNSPECIFIED: ICD-10-CM

## 2025-01-16 LAB — HBA1C MFR BLD HPLC: 6.3

## 2025-01-16 PROCEDURE — G2211 COMPLEX E/M VISIT ADD ON: CPT | Mod: NC

## 2025-01-16 PROCEDURE — 99213 OFFICE O/P EST LOW 20 MIN: CPT

## 2025-01-16 RX ORDER — BLOOD-GLUCOSE SENSOR
EACH MISCELLANEOUS
Qty: 6 | Refills: 2 | Status: ACTIVE | COMMUNITY
Start: 2025-01-16 | End: 1900-01-01

## 2025-01-16 RX ORDER — BLOOD-GLUCOSE,RECEIVER,CONT
EACH MISCELLANEOUS
Qty: 1 | Refills: 2 | Status: ACTIVE | COMMUNITY
Start: 2025-01-16 | End: 1900-01-01

## 2025-01-17 ENCOUNTER — NON-APPOINTMENT (OUTPATIENT)
Age: 60
End: 2025-01-17

## 2025-01-17 DIAGNOSIS — E11.9 TYPE 2 DIABETES MELLITUS WITHOUT COMPLICATIONS: ICD-10-CM

## 2025-01-17 DIAGNOSIS — I10 ESSENTIAL (PRIMARY) HYPERTENSION: ICD-10-CM

## 2025-01-17 DIAGNOSIS — D72.10 EOSINOPHILIA, UNSPECIFIED: ICD-10-CM

## 2025-01-17 DIAGNOSIS — R05.9 COUGH, UNSPECIFIED: ICD-10-CM

## 2025-01-17 DIAGNOSIS — R51.9 HEADACHE, UNSPECIFIED: ICD-10-CM

## 2025-01-17 DIAGNOSIS — J30.2 OTHER SEASONAL ALLERGIC RHINITIS: ICD-10-CM

## 2025-01-17 DIAGNOSIS — M54.31 SCIATICA, RIGHT SIDE: ICD-10-CM

## 2025-01-17 DIAGNOSIS — E78.5 HYPERLIPIDEMIA, UNSPECIFIED: ICD-10-CM

## 2025-01-17 LAB
CREAT SPEC-SCNC: 217 MG/DL
MICROALBUMIN 24H UR DL<=1MG/L-MCNC: 10.7 MG/DL
MICROALBUMIN/CREAT 24H UR-RTO: 49 MG/G

## 2025-01-29 ENCOUNTER — APPOINTMENT (OUTPATIENT)
Dept: INTERNAL MEDICINE | Facility: CLINIC | Age: 60
End: 2025-01-29
Payer: MEDICAID

## 2025-01-29 ENCOUNTER — OUTPATIENT (OUTPATIENT)
Dept: OUTPATIENT SERVICES | Facility: HOSPITAL | Age: 60
LOS: 1 days | End: 2025-01-29

## 2025-01-29 VITALS
DIASTOLIC BLOOD PRESSURE: 70 MMHG | HEART RATE: 89 BPM | TEMPERATURE: 97.7 F | WEIGHT: 179 LBS | RESPIRATION RATE: 16 BRPM | BODY MASS INDEX: 28.09 KG/M2 | OXYGEN SATURATION: 96 % | SYSTOLIC BLOOD PRESSURE: 111 MMHG | HEIGHT: 67 IN

## 2025-01-29 DIAGNOSIS — R05.9 COUGH, UNSPECIFIED: ICD-10-CM

## 2025-01-29 PROCEDURE — 99213 OFFICE O/P EST LOW 20 MIN: CPT | Mod: GE

## 2025-01-29 RX ORDER — ASCORBIC ACID 500 MG
500 TABLET ORAL DAILY
Qty: 90 | Refills: 3 | Status: ACTIVE | COMMUNITY
Start: 2025-01-29 | End: 1900-01-01

## 2025-01-29 RX ORDER — CHROMIUM 200 MCG
25 MCG TABLET ORAL DAILY
Qty: 90 | Refills: 3 | Status: ACTIVE | COMMUNITY
Start: 2025-01-29 | End: 1900-01-01

## 2025-01-29 RX ORDER — AZITHROMYCIN 250 MG/1
250 TABLET, FILM COATED ORAL
Qty: 1 | Refills: 0 | Status: ACTIVE | COMMUNITY
Start: 2025-01-29 | End: 1900-01-01

## 2025-02-03 DIAGNOSIS — R05.9 COUGH, UNSPECIFIED: ICD-10-CM

## 2025-02-25 ENCOUNTER — APPOINTMENT (OUTPATIENT)
Age: 60
End: 2025-02-25
Payer: COMMERCIAL

## 2025-02-25 PROCEDURE — NTX: CUSTOM

## 2025-04-10 ENCOUNTER — APPOINTMENT (OUTPATIENT)
Age: 60
End: 2025-04-10

## 2025-04-21 ENCOUNTER — RX RENEWAL (OUTPATIENT)
Age: 60
End: 2025-04-21

## 2025-04-22 ENCOUNTER — RX RENEWAL (OUTPATIENT)
Age: 60
End: 2025-04-22

## 2025-04-22 RX ORDER — MULTIVIT-MIN/FOLIC/VIT K/LYCOP 400-300MCG
25 MCG TABLET ORAL
Qty: 90 | Refills: 0 | Status: ACTIVE | COMMUNITY
Start: 2025-04-22 | End: 1900-01-01

## 2025-04-30 ENCOUNTER — APPOINTMENT (OUTPATIENT)
Dept: INTERNAL MEDICINE | Facility: CLINIC | Age: 60
End: 2025-04-30
Payer: MEDICAID

## 2025-04-30 ENCOUNTER — OUTPATIENT (OUTPATIENT)
Dept: OUTPATIENT SERVICES | Facility: HOSPITAL | Age: 60
LOS: 1 days | End: 2025-04-30

## 2025-04-30 ENCOUNTER — NON-APPOINTMENT (OUTPATIENT)
Age: 60
End: 2025-04-30

## 2025-04-30 VITALS
SYSTOLIC BLOOD PRESSURE: 117 MMHG | HEIGHT: 67 IN | WEIGHT: 185 LBS | BODY MASS INDEX: 29.03 KG/M2 | DIASTOLIC BLOOD PRESSURE: 76 MMHG

## 2025-04-30 DIAGNOSIS — M54.16 RADICULOPATHY, LUMBAR REGION: ICD-10-CM

## 2025-04-30 DIAGNOSIS — M48.061 SPINAL STENOSIS, LUMBAR REGION WITHOUT NEUROGENIC CLAUDICATION: ICD-10-CM

## 2025-04-30 DIAGNOSIS — M54.31 SCIATICA, RIGHT SIDE: ICD-10-CM

## 2025-04-30 DIAGNOSIS — M54.50 LOW BACK PAIN, UNSPECIFIED: ICD-10-CM

## 2025-04-30 DIAGNOSIS — Z00.00 ENCOUNTER FOR GENERAL ADULT MEDICAL EXAMINATION W/OUT ABNORMAL FINDINGS: ICD-10-CM

## 2025-04-30 DIAGNOSIS — M47.817 SPONDYLOSIS W/OUT MYELOPATHY OR RADICULOPATHY, LUMBOSACRAL REGION: ICD-10-CM

## 2025-04-30 DIAGNOSIS — K21.9 GASTRO-ESOPHAGEAL REFLUX DISEASE W/OUT ESOPHAGITIS: ICD-10-CM

## 2025-04-30 DIAGNOSIS — M54.32 SCIATICA, RIGHT SIDE: ICD-10-CM

## 2025-04-30 DIAGNOSIS — R05.9 COUGH, UNSPECIFIED: ICD-10-CM

## 2025-04-30 PROCEDURE — 99396 PREV VISIT EST AGE 40-64: CPT | Mod: GC

## 2025-05-02 DIAGNOSIS — M54.31 SCIATICA, RIGHT SIDE: ICD-10-CM

## 2025-05-02 DIAGNOSIS — Z00.00 ENCOUNTER FOR GENERAL ADULT MEDICAL EXAMINATION WITHOUT ABNORMAL FINDINGS: ICD-10-CM

## 2025-05-02 DIAGNOSIS — M54.16 RADICULOPATHY, LUMBAR REGION: ICD-10-CM

## 2025-05-02 DIAGNOSIS — M48.061 SPINAL STENOSIS, LUMBAR REGION WITHOUT NEUROGENIC CLAUDICATION: ICD-10-CM

## 2025-05-02 DIAGNOSIS — M54.50 LOW BACK PAIN, UNSPECIFIED: ICD-10-CM

## 2025-05-02 DIAGNOSIS — K21.9 GASTRO-ESOPHAGEAL REFLUX DISEASE WITHOUT ESOPHAGITIS: ICD-10-CM

## 2025-05-02 DIAGNOSIS — R05.9 COUGH, UNSPECIFIED: ICD-10-CM

## 2025-05-02 DIAGNOSIS — M47.817 SPONDYLOSIS WITHOUT MYELOPATHY OR RADICULOPATHY, LUMBOSACRAL REGION: ICD-10-CM

## 2025-05-02 LAB
24R-OH-CALCIDIOL SERPL-MCNC: 37.9 PG/ML
ALBUMIN SERPL ELPH-MCNC: 4.5 G/DL
ALP BLD-CCNC: 66 U/L
ALT SERPL-CCNC: 23 U/L
ANION GAP SERPL CALC-SCNC: 13 MMOL/L
AST SERPL-CCNC: 24 U/L
BASOPHILS # BLD AUTO: 0.1 K/UL
BASOPHILS # BLD AUTO: 0.1 K/UL
BASOPHILS NFR BLD AUTO: 1.1 %
BASOPHILS NFR BLD AUTO: 1.1 %
BILIRUB SERPL-MCNC: 0.3 MG/DL
BUN SERPL-MCNC: 19 MG/DL
CALCIUM SERPL-MCNC: 9.5 MG/DL
CHLORIDE SERPL-SCNC: 101 MMOL/L
CHOLEST SERPL-MCNC: 123 MG/DL
CHOLEST SERPL-MCNC: 127 MG/DL
CO2 SERPL-SCNC: 26 MMOL/L
CREAT SERPL-MCNC: 1.06 MG/DL
CREAT SPEC-SCNC: 72 MG/DL
EGFRCR SERPLBLD CKD-EPI 2021: 81 ML/MIN/1.73M2
EOSINOPHIL # BLD AUTO: 0.98 K/UL
EOSINOPHIL # BLD AUTO: 0.99 K/UL
EOSINOPHIL NFR BLD AUTO: 11.1 %
EOSINOPHIL NFR BLD AUTO: 11.1 %
ESTIMATED AVERAGE GLUCOSE: 148 MG/DL
GLUCOSE SERPL-MCNC: 94 MG/DL
HBA1C MFR BLD HPLC: 6.8 %
HCT VFR BLD CALC: 43 %
HCT VFR BLD CALC: 43.6 %
HDLC SERPL-MCNC: 48 MG/DL
HDLC SERPL-MCNC: 48 MG/DL
HGB BLD-MCNC: 13.8 G/DL
HGB BLD-MCNC: 13.9 G/DL
IMM GRANULOCYTES NFR BLD AUTO: 0.2 %
IMM GRANULOCYTES NFR BLD AUTO: 0.2 %
LDLC SERPL-MCNC: 53 MG/DL
LDLC SERPL-MCNC: 56 MG/DL
LYMPHOCYTES # BLD AUTO: 1.91 K/UL
LYMPHOCYTES # BLD AUTO: 1.99 K/UL
LYMPHOCYTES NFR BLD AUTO: 21.6 %
LYMPHOCYTES NFR BLD AUTO: 22.2 %
MAN DIFF?: NORMAL
MAN DIFF?: NORMAL
MCHC RBC-ENTMCNC: 28.3 PG
MCHC RBC-ENTMCNC: 28.6 PG
MCHC RBC-ENTMCNC: 31.7 G/DL
MCHC RBC-ENTMCNC: 32.3 G/DL
MCV RBC AUTO: 87.4 FL
MCV RBC AUTO: 90.5 FL
MICROALBUMIN 24H UR DL<=1MG/L-MCNC: 4.3 MG/DL
MICROALBUMIN/CREAT 24H UR-RTO: 59 MG/G
MONOCYTES # BLD AUTO: 0.73 K/UL
MONOCYTES # BLD AUTO: 0.74 K/UL
MONOCYTES NFR BLD AUTO: 8.2 %
MONOCYTES NFR BLD AUTO: 8.4 %
NEUTROPHILS # BLD AUTO: 5.09 K/UL
NEUTROPHILS # BLD AUTO: 5.12 K/UL
NEUTROPHILS NFR BLD AUTO: 57.2 %
NEUTROPHILS NFR BLD AUTO: 57.6 %
NONHDLC SERPL-MCNC: 75 MG/DL
NONHDLC SERPL-MCNC: 78 MG/DL
PLATELET # BLD AUTO: 244 K/UL
PLATELET # BLD AUTO: 250 K/UL
POTASSIUM SERPL-SCNC: 4.5 MMOL/L
PROT SERPL-MCNC: 7.6 G/DL
RBC # BLD: 4.82 M/UL
RBC # BLD: 4.92 M/UL
RBC # FLD: 13.7 %
RBC # FLD: 13.8 %
SODIUM SERPL-SCNC: 140 MMOL/L
TRIGL SERPL-MCNC: 123 MG/DL
TRIGL SERPL-MCNC: 126 MG/DL
WBC # FLD AUTO: 8.84 K/UL
WBC # FLD AUTO: 8.95 K/UL

## 2025-05-02 RX ORDER — EMPAGLIFLOZIN 25 MG/1
25 TABLET, FILM COATED ORAL DAILY
Qty: 90 | Refills: 2 | Status: ACTIVE | COMMUNITY
Start: 2025-05-02 | End: 1900-01-01

## 2025-06-25 ENCOUNTER — APPOINTMENT (OUTPATIENT)
Age: 60
End: 2025-06-25

## 2025-07-17 ENCOUNTER — APPOINTMENT (OUTPATIENT)
Age: 60
End: 2025-07-17
Payer: MEDICAID

## 2025-07-17 ENCOUNTER — NON-APPOINTMENT (OUTPATIENT)
Age: 60
End: 2025-07-17

## 2025-07-17 PROCEDURE — 92014 COMPRE OPH EXAM EST PT 1/>: CPT

## 2025-07-17 PROCEDURE — 92133 CPTRZD OPH DX IMG PST SGM ON: CPT

## 2025-07-17 PROCEDURE — 92134 CPTRZ OPH DX IMG PST SGM RTA: CPT | Mod: NC

## 2025-07-22 ENCOUNTER — APPOINTMENT (OUTPATIENT)
Age: 60
End: 2025-07-22

## 2025-07-22 PROCEDURE — NTX: CUSTOM

## 2025-08-13 ENCOUNTER — APPOINTMENT (OUTPATIENT)
Age: 60
End: 2025-08-13

## 2025-08-20 ENCOUNTER — RX RENEWAL (OUTPATIENT)
Age: 60
End: 2025-08-20

## 2025-08-21 ENCOUNTER — APPOINTMENT (OUTPATIENT)
Age: 60
End: 2025-08-21
Payer: COMMERCIAL

## 2025-08-22 PROCEDURE — NTX: CUSTOM

## 2025-08-27 ENCOUNTER — APPOINTMENT (OUTPATIENT)
Age: 60
End: 2025-08-27